# Patient Record
Sex: MALE | Race: WHITE | NOT HISPANIC OR LATINO | Employment: FULL TIME | URBAN - NONMETROPOLITAN AREA
[De-identification: names, ages, dates, MRNs, and addresses within clinical notes are randomized per-mention and may not be internally consistent; named-entity substitution may affect disease eponyms.]

---

## 2017-08-30 ENCOUNTER — APPOINTMENT (OUTPATIENT)
Dept: GENERAL RADIOLOGY | Facility: HOSPITAL | Age: 62
End: 2017-08-30
Attending: FAMILY MEDICINE

## 2017-08-30 PROCEDURE — 71020 HC CHEST PA AND LATERAL: CPT

## 2018-02-15 ENCOUNTER — OFFICE VISIT (OUTPATIENT)
Dept: OTOLARYNGOLOGY | Facility: CLINIC | Age: 63
End: 2018-02-15

## 2018-02-15 VITALS
WEIGHT: 261 LBS | HEART RATE: 60 BPM | SYSTOLIC BLOOD PRESSURE: 130 MMHG | TEMPERATURE: 97.8 F | DIASTOLIC BLOOD PRESSURE: 88 MMHG | BODY MASS INDEX: 38.66 KG/M2 | HEIGHT: 69 IN

## 2018-02-15 DIAGNOSIS — L43.9 LICHEN PLANUS: Primary | ICD-10-CM

## 2018-02-15 DIAGNOSIS — K21.9 LARYNGOPHARYNGEAL REFLUX: ICD-10-CM

## 2018-02-15 PROCEDURE — 99203 OFFICE O/P NEW LOW 30 MIN: CPT | Performed by: OTOLARYNGOLOGY

## 2018-02-15 RX ORDER — BETAMETHASONE DIPROPIONATE 0.5 MG/ML
LOTION, AUGMENTED TOPICAL DAILY
Qty: 15 ML | Refills: 1 | Status: SHIPPED | OUTPATIENT
Start: 2018-02-15

## 2018-02-15 RX ORDER — OXYMETAZOLINE HYDROCHLORIDE 0.05 G/100ML
2 SPRAY NASAL 2 TIMES DAILY
COMMUNITY

## 2018-02-15 RX ORDER — AZELASTINE 1 MG/ML
2 SPRAY, METERED NASAL DAILY
Qty: 1 EACH | Refills: 0 | Status: SHIPPED | OUTPATIENT
Start: 2018-02-15 | End: 2018-03-17

## 2018-02-15 RX ORDER — AZELASTINE 1 MG/ML
2 SPRAY, METERED NASAL 2 TIMES DAILY
COMMUNITY
End: 2018-02-15 | Stop reason: SDUPTHER

## 2018-02-15 NOTE — PROGRESS NOTES
YOB: 1955  Location: [facility]  Location Address: [address] [place] [address], [facility], [address] [place] [address]  Location Phone: [phone]    Chief Complaint   Patient presents with   • Tongue lesion       History of Present Illness  Rikki Amaya is a 62 y.o. male.  Rikki Amaya is here for evaluation of ENT complaints. The patient has had problems with lesion of tongue and right cheek.   The patient has had moderate symptoms. The symptoms have been present approximately 1 month.  The symptoms are aggravated by  eating and reflux disease. There have been no factors that have improved the symptoms. Patient states he has a sore throat, hoarseness and reflux has not been controlled on Protonix recently. He denies dysphagia, ear pain, swelling and tenderness of neck and sinus complaints.          Past Medical History:   Diagnosis Date   • Basal cell carcinoma    • Hyperlipidemia    • Hypertension    • Kidney stone        Past Surgical History:   Procedure Laterality Date   • GANGLION CYST EXCISION     • KNEE ARTHROPLASTY     • NECK SURGERY     • SHOULDER SURGERY     • UMBILICAL HERNIA REPAIR         Outpatient Prescriptions Marked as Taking for the 2/15/18 encounter (Office Visit) with Garry Hernandez MD   Medication Sig Dispense Refill   • amLODIPine (NORVASC) 10 MG tablet Take 10 mg by mouth Daily.     • azelastine (ASTELIN) 0.1 % nasal spray 2 sprays into each nostril 2 (Two) Times a Day. Use in each nostril as directed     • Ibuprofen-Diphenhydramine Cit (ADVIL PM PO) Take  by mouth.     • lisinopril 10 MG tablet 20 mg, hydrochlorothiazide 25 MG tablet 12.5 mg Take  by mouth Daily.     • metoprolol succinate XL (TOPROL-XL) 100 MG 24 hr tablet Take 100 mg by mouth Daily.     • oxymetazoline (AFRIN) 0.05 % nasal spray 2 sprays into each nostril 2 (Two) Times a Day.     • pantoprazole (PROTONIX) 40 MG EC tablet Take 40 mg by mouth Daily.         Review of patient's allergies indicates no known  allergies.    History reviewed. No pertinent family history.    Social History     Social History   • Marital status:      Spouse name: N/A   • Number of children: N/A   • Years of education: N/A     Occupational History   • Not on file.     Social History Main Topics   • Smoking status: Never Smoker   • Smokeless tobacco: Former User     Types: Chew   • Alcohol use No   • Drug use: Not on file   • Sexual activity: Not on file     Other Topics Concern   • Not on file     Social History Narrative       Review of Systems   Constitutional: Negative.    HENT: Positive for sore throat and voice change.         Lesion of tongue and right cheek, reflux   Eyes: Negative.    Respiratory: Negative.    Cardiovascular: Negative.    Gastrointestinal: Negative.    Endocrine: Negative.    Musculoskeletal: Negative.    Skin: Negative.    Allergic/Immunologic: Negative.    Neurological: Negative.    Hematological: Negative.    Psychiatric/Behavioral: Negative.        Vitals:    02/15/18 1454   BP: 130/88   Pulse: 60   Temp: 97.8 °F (36.6 °C)       Body mass index is 38.54 kg/(m^2).    Objective     Physical Exam  CONSTITUTIONAL: Obese, well nourished, alert, oriented, in no acute distress     COMMUNICATION AND VOICE: able to communicate normally, normal voice quality    HEAD: normocephalic, no lesions, atraumatic, no tenderness, no masses     FACE: appearance normal, no lesions, no tenderness, no deformities, facial motion symmetric    SALIVARY GLANDS: parotid glands with no tenderness, no swelling, no masses, submandibular glands with normal size, nontender    EYES: ocular motility normal, eyelids normal, orbits normal, no proptosis, conjunctiva normal , pupils equal, round     EARS:  Hearing: response to conversational voice normal bilaterally   External Ears: auricles without lesions  Otoscopic: tympanic membrane appearance normal, no lesions, no perforation, normal mobility, no fluid    NOSE:  External Nose: structure  normal, no tenderness on palpation, no nasal discharge, no lesions, no evidence of trauma, nostrils patent   Intranasal Exam: nasal mucosa normal, vestibule within normal limits, inferior turbinate normal, nasal septum midline   Nasopharynx:     ORAL:  Lips: upper and lower lips without lesion   Teeth: dentition within normal limits for age   Gums: gingivae healthy   Oral Mucosa: oral mucosa with mild erythema and associated reticulated stria in a lacelike pattern over the right and left buccal mucosa, slightly worse on the left  Floor of Mouth: Warthin’s duct patent, mucosa normal  Tongue: lingual mucosa normal with small lacelike pattern the right lateral tongue without ulceration, normal tongue mobility   Palate: soft and hard palates with normal mucosa and structure  Oropharynx: oropharyngeal mucosa normal    HYPOPHARYNX:   LARYNX:     NECK: neck appearance normal, no mass,  noted without erythema or tenderness    THYROID: no overt thyromegaly, no tenderness, nodules or mass present on palpation, position midline     LYMPH NODES: no lymphadenopathy    CHEST/RESPIRATORY: respiratory effort normal, normal breath sounds     CARDIOVASCULAR: rate and rhythm normal, extremities without cyanosis or edema      NEUROLOGIC/PSYCHIATRIC: oriented to time, place and person, mood normal, affect appropriate, CN II-XII intact grossly    Assessment/Plan   Rikki was seen today for tongue lesion.    Diagnoses and all orders for this visit:    Lichen planus    Laryngopharyngeal reflux    Other orders  -     betamethasone, augmented, (DIPROLENE) 0.05 % lotion; Apply  topically Daily. As needed      * Surgery not found *  No orders of the defined types were placed in this encounter.    Return in about 3 months (around 5/15/2018) for Recheck.       Patient Instructions     Normal course for lichen planus was discussed with the patient including the small risk of the development of malignancy.    Follow-up was recommended and  encouraged    Dietary modifications were discussed including Paleo  Reflux control  Call or return for any problems otherwise follow-up in 3 months        IF YOU SMOKE OR USE TOBACCO PLEASE READ THE FOLLOWING:    Why is smoking bad for me?  Smoking increases the risk of heart disease, lung disease, vascular disease, stroke, and cancer.     If you smoke, STOP!    If you would like more information on quitting smoking, please visit the [facility] website: [email or web address]   This link will provide additional resources including the QUIT line and the Beat the Pack support groups.     For more information:    Quit Now [place]  1-800-QUIT-NOW  https://[place].VubiquitylogAdvisity.org/en-US/    MyPlate from [employer]  The general, healthful diet is based on the 2010 Dietary Guidelines for Americans. The amount of food you need to eat from each food group depends on your age, sex, and level of physical activity and can be individualized by a dietitian. Go to ChooseAvega SystemsPlate.gov for more information.  What do I need to know about the MyPlate plan?  · Enjoy your food, but eat less.  · Avoid oversized portions.  ¨ ½ of your plate should include fruits and vegetables.  ¨ ¼ of your plate should be grains.  ¨ ¼ of your plate should be protein.  Grains   · Make at least half of your grains whole grains.  · For a 2,000 calorie daily food plan, eat 6 oz every day.  · 1 oz is about 1 slice bread, 1 cup cereal, or ½ cup cooked rice, cereal, or pasta.  Vegetables   · Make half your plate fruits and vegetables.  · For a 2,000 calorie daily food plan, eat 2½ cups every day.  · 1 cup is about 1 cup raw or cooked vegetables or vegetable juice or 2 cups raw leafy greens.  Fruits   · Make half your plate fruits and vegetables.  · For a 2,000 calorie daily food plan, eat 2 cups every day.  · 1 cup is about 1 cup fruit or 100% fruit juice or ½ cup dried fruit.  Protein   · For a 2,000 calorie daily food  plan, eat 5½ oz every day.  · 1 oz is about 1 oz meat, poultry, or fish, ¼ cup cooked beans, 1 egg, 1 Tbsp peanut butter, or ½ oz nuts or seeds.  Dairy   · Switch to fat-free or low-fat (1%) milk.  · For a 2,000 calorie daily food plan, eat 3 cups every day.  · 1 cup is about 1 cup milk or yogurt or soy milk (soy beverage), 1½ oz natural cheese, or 2 oz processed cheese.  Fats, Oils, and Empty Calories   · Only small amounts of oils are recommended.  · Empty calories are calories from solid fats or added sugars.  · Compare sodium in foods like soup, bread, and frozen meals. Choose the foods with lower numbers.  · Drink water instead of sugary drinks.  What foods can I eat?  Grains   Whole grains such as whole wheat, quinoa, millet, and bulgur. Bread, rolls, and pasta made from whole grains. Brown or wild rice. Hot or cold cereals made from whole grains and without added sugar.  Vegetables   All fresh vegetables, especially fresh red, dark green, or orange vegetables. Peas and beans. Low-sodium frozen or canned vegetables prepared without added salt. Low-sodium vegetable juices.  Fruits   All fresh, frozen, and dried fruits. Canned fruit packed in water or fruit juice without added sugar. Fruit juices without added sugar.  Meats and Other Protein Sources   Boiled, baked, or grilled lean meat trimmed of fat. Skinless poultry. Fresh seafood and shellfish. Canned seafood packed in water. Unsalted nuts and unsalted nut butters. Tofu. Dried beans and pea. Eggs.  Dairy   Low-fat or fat-free milk, yogurt, and cheeses.  Sweets and Desserts   Frozen desserts made from low-fat milk.  Fats and Oils   Olive, peanut, and canola oils and margarine. Salad dressing and mayonnaise made from these oils.  Other   Soups and casseroles made from allowed ingredients and without added fat or salt.  The items listed above may not be a complete list of recommended foods or beverages. Contact your dietitian for more options.   What foods are  not recommended?  Grains   Sweetened, low-fiber cereals. Packaged baked goods. Snack crackers and chips. Cheese crackers, butter crackers, and biscuits. Frozen waffles, sweet breads, doughnuts, pastries, packaged baking mixes, pancakes, cakes, and cookies.  Vegetables   Regular canned or frozen vegetables or vegetables prepared with salt. Canned tomatoes. Canned tomato sauce. Fried vegetables. Vegetables in cream sauce or cheese sauce.  Fruits   Fruits packed in syrup or made with added sugar.  Meats and Other Protein Sources   Marbled or fatty meats such as ribs. Poultry with skin. Fried meats, poultry, eggs, or fish. Sausages, hot dogs, and deli meats such as pastrami, bologna, or salami.  Dairy   Whole milk, cream, cheeses made from whole milk, sour cream. Ice cream or yogurt made from whole milk or with added sugar.  Beverages   For adults, no more than one alcoholic drink per day. Regular soft drinks or other sugary beverages. Juice drinks.  Sweets and Desserts   Sugary or fatty desserts, candy, and other sweets.  Fats and Oils   Solid shortening or partially hydrogenated oils. Solid margarine. Margarine that contains trans fats. Butter.  The items listed above may not be a complete list of foods and beverages to avoid. Contact your dietitian for more information.   This information is not intended to replace advice given to you by your health care provider. Make sure you discuss any questions you have with your health care provider.  Document Released: 01/06/2009 Document Revised: 05/25/2017 Document Reviewed: 11/26/2014  [employer] Interactive Patient Education © 2017 [employer] Inc.     Calorie Counting for Weight Loss  Calories are units of energy. Your body needs a certain amount of calories from food to keep you going throughout the day. When you eat more calories than your body needs, your body stores the extra calories as fat. When you eat fewer calories than your body needs, your body burns fat to get the  energy it needs.  Calorie counting means keeping track of how many calories you eat and drink each day. Calorie counting can be helpful if you need to lose weight. If you make sure to eat fewer calories than your body needs, you should lose weight. Ask your health care provider what a healthy weight is for you.  For calorie counting to work, you will need to eat the right number of calories in a day in order to lose a healthy amount of weight per week. A dietitian can help you determine how many calories you need in a day and will give you suggestions on how to reach your calorie goal.  · A healthy amount of weight to lose per week is usually 1-2 lb (0.5-0.9 kg). This usually means that your daily calorie intake should be reduced by 500-750 calories.  · Eating 1,200 - 1,500 calories per day can help most women lose weight.  · Eating 1,500 - 1,800 calories per day can help most men lose weight.  What is my plan?  My goal is to have __________ calories per day.  If I have this many calories per day, I should lose around __________ pounds per week.  What do I need to know about calorie counting?  In order to meet your daily calorie goal, you will need to:  · Find out how many calories are in each food you would like to eat. Try to do this before you eat.  · Decide how much of the food you plan to eat.  · Write down what you ate and how many calories it had. Doing this is called keeping a food log.  To successfully lose weight, it is important to balance calorie counting with a healthy lifestyle that includes regular activity. Aim for 150 minutes of moderate exercise (such as walking) or 75 minutes of vigorous exercise (such as running) each week.  Where do I find calorie information?     The number of calories in a food can be found on a Nutrition Facts label. If a food does not have a Nutrition Facts label, try to look up the calories online or ask your dietitian for help.  Remember that calories are listed per  serving. If you choose to have more than one serving of a food, you will have to multiply the calories per serving by the amount of servings you plan to eat. For example, the label on a package of bread might say that a serving size is 1 slice and that there are 90 calories in a serving. If you eat 1 slice, you will have eaten 90 calories. If you eat 2 slices, you will have eaten 180 calories.  How do I keep a food log?  Immediately after each meal, record the following information in your food log:  · What you ate. Don't forget to include toppings, sauces, and other extras on the food.  · How much you ate. This can be measured in cups, ounces, or number of items.  · How many calories each food and drink had.  · The total number of calories in the meal.  Keep your food log near you, such as in a small notebook in your pocket, or use a mobile geraldo or website. Some programs will calculate calories for you and show you how many calories you have left for the day to meet your goal.  What are some calorie counting tips?  · Use your calories on foods and drinks that will fill you up and not leave you hungry:  ¨ Some examples of foods that fill you up are nuts and nut butters, vegetables, lean proteins, and high-fiber foods like whole grains. High-fiber foods are foods with more than 5 g fiber per serving.  ¨ Drinks such as sodas, specialty coffee drinks, alcohol, and juices have a lot of calories, yet do not fill you up.  · Eat nutritious foods and avoid empty calories. Empty calories are calories you get from foods or beverages that do not have many vitamins or protein, such as candy, sweets, and soda. It is better to have a nutritious high-calorie food (such as an avocado) than a food with few nutrients (such as a bag of chips).  · Know how many calories are in the foods you eat most often. This will help you calculate calorie counts faster.  · Pay attention to calories in drinks. Low-calorie drinks include water and  "unsweetened drinks.  · Pay attention to nutrition labels for \"low fat\" or \"fat free\" foods. These foods sometimes have the same amount of calories or more calories than the full fat versions. They also often have added sugar, starch, or salt, to make up for flavor that was removed with the fat.  · Find a way of tracking calories that works for you. Get creative. Try different apps or programs if writing down calories does not work for you.  What are some portion control tips?  · Know how many calories are in a serving. This will help you know how many servings of a certain food you can have.  · Use a measuring cup to measure serving sizes. You could also try weighing out portions on a kitchen scale. With time, you will be able to estimate serving sizes for some foods.  · Take some time to put servings of different foods on your favorite plates, bowls, and cups so you know what a serving looks like.  · Try not to eat straight from a bag or box. Doing this can lead to overeating. Put the amount you would like to eat in a cup or on a plate to make sure you are eating the right portion.  · Use smaller plates, glasses, and bowls to prevent overeating.  · Try not to multitask (for example, watch TV or use your computer) while eating. If it is time to eat, sit down at a table and enjoy your food. This will help you to know when you are full. It will also help you to be aware of what you are eating and how much you are eating.  What are tips for following this plan?  Reading food labels   · Check the calorie count compared to the serving size. The serving size may be smaller than what you are used to eating.  · Check the source of the calories. Make sure the food you are eating is high in vitamins and protein and low in saturated and trans fats.  Shopping   · Read nutrition labels while you shop. This will help you make healthy decisions before you decide to purchase your food.  · Make a grocery list and stick to " it.  Cooking   · Try to cook your favorite foods in a healthier way. For example, try baking instead of frying.  · Use low-fat dairy products.  Meal planning   · Use more fruits and vegetables. Half of your plate should be fruits and vegetables.  · Include lean proteins like poultry and fish.  How do I count calories when eating out?  · Ask for smaller portion sizes.  · Consider sharing an entree and sides instead of getting your own entree.  · If you get your own entree, eat only half. Ask for a box at the beginning of your meal and put the rest of your entree in it so you are not tempted to eat it.  · If calories are listed on the menu, choose the lower calorie options.  · Choose dishes that include vegetables, fruits, whole grains, low-fat dairy products, and lean protein.  · Choose items that are boiled, broiled, grilled, or steamed. Stay away from items that are buttered, battered, fried, or served with cream sauce. Items labeled “crispy” are usually fried, unless stated otherwise.  · Choose water, low-fat milk, unsweetened iced tea, or other drinks without added sugar. If you want an alcoholic beverage, choose a lower calorie option such as a glass of wine or light beer.  · Ask for dressings, sauces, and syrups on the side. These are usually high in calories, so you should limit the amount you eat.  · If you want a salad, choose a garden salad and ask for grilled meats. Avoid extra toppings like reese, cheese, or fried items. Ask for the dressing on the side, or ask for olive oil and vinegar or lemon to use as dressing.  · Estimate how many servings of a food you are given. For example, a serving of cooked rice is ½ cup or about the size of half a baseball. Knowing serving sizes will help you be aware of how much food you are eating at restaurants. The list below tells you how big or small some common portion sizes are based on everyday objects:  ¨ 1 oz--4 stacked dice.  ¨ 3 oz--1 deck of cards.  ¨ 1 tsp--1  die.  ¨ 1 Tbsp--½ a ping-pong ball.  ¨ 2 Tbsp--1 ping-pong ball.  ¨ ½ cup--½ baseball.  ¨ 1 cup--1 baseball.  Summary  · Calorie counting means keeping track of how many calories you eat and drink each day. If you eat fewer calories than your body needs, you should lose weight.  · A healthy amount of weight to lose per week is usually 1-2 lb (0.5-0.9 kg). This usually means reducing your daily calorie intake by 500-750 calories.  · The number of calories in a food can be found on a Nutrition Facts label. If a food does not have a Nutrition Facts label, try to look up the calories online or ask your dietitian for help.  · Use your calories on foods and drinks that will fill you up, and not on foods and drinks that will leave you hungry.  · Use smaller plates, glasses, and bowls to prevent overeating.  This information is not intended to replace advice given to you by your health care provider. Make sure you discuss any questions you have with your health care provider.  Document Released: 12/18/2006 Document Revised: 11/17/2017 Document Reviewed: 11/17/2017  [employer] Interactive Patient Education © 2017 [employer].     Exercising to Lose Weight  Exercising can help you to lose weight. In order to lose weight through exercise, you need to do vigorous-intensity exercise. You can tell that you are exercising with vigorous intensity if you are breathing very hard and fast and cannot hold a conversation while exercising.  Moderate-intensity exercise helps to maintain your current weight. You can tell that you are exercising at a moderate level if you have a higher heart rate and faster breathing, but you are still able to hold a conversation.  How often should I exercise?  Choose an activity that you enjoy and set realistic goals. Your health care provider can help you to make an activity plan that works for you. Exercise regularly as directed by your health care provider. This may include:  · Doing resistance training  twice each week, such as:  ¨ Push-ups.  ¨ Sit-ups.  ¨ Lifting weights.  ¨ Using resistance bands.  · Doing a given intensity of exercise for a given amount of time. Choose from these options:  ¨ 150 minutes of moderate-intensity exercise every week.  ¨ 75 minutes of vigorous-intensity exercise every week.  ¨ A mix of moderate-intensity and vigorous-intensity exercise every week.  Children, pregnant women, people who are out of shape, people who are overweight, and older adults may need to consult a health care provider for individual recommendations. If you have any sort of medical condition, be sure to consult your health care provider before starting a new exercise program.  What are some activities that can help me to lose weight?  · Walking at a rate of at least 4.5 miles an hour.  · Jogging or running at a rate of 5 miles per hour.  · Biking at a rate of at least 10 miles per hour.  · Lap swimming.  · Roller-skating or in-line skating.  · Cross-country skiing.  · Vigorous competitive sports, such as football, basketball, and soccer.  · Jumping rope.  · Aerobic dancing.  How can I be more active in my day-to-day activities?  · Use the stairs instead of the elevator.  · Take a walk during your lunch break.  · If you drive, park your car farther away from work or school.  · If you take public transportation, get off one stop early and walk the rest of the way.  · Make all of your phone calls while standing up and walking around.  · Get up, stretch, and walk around every 30 minutes throughout the day.  What guidelines should I follow while exercising?  · Do not exercise so much that you hurt yourself, feel dizzy, or get very short of breath.  · Consult your health care provider prior to starting a new exercise program.  · Wear comfortable clothes and shoes with good support.  · Drink plenty of water while you exercise to prevent dehydration or heat stroke. Body water is lost during exercise and must be  replaced.  · Work out until you breathe faster and your heart beats faster.  This information is not intended to replace advice given to you by your health care provider. Make sure you discuss any questions you have with your health care provider.  Document Released: 01/20/2012 Document Revised: 05/25/2017 Document Reviewed: 05/21/2015  [employer] Interactive Patient Education © 2017 [employer].

## 2018-02-15 NOTE — PATIENT INSTRUCTIONS
Normal course for lichen planus was discussed with the patient including the small risk of the development of malignancy.    Follow-up was recommended and encouraged    Dietary modifications were discussed including Paleo  Reflux control  Call or return for any problems otherwise follow-up in 3 months        IF YOU SMOKE OR USE TOBACCO PLEASE READ THE FOLLOWING:    Why is smoking bad for me?  Smoking increases the risk of heart disease, lung disease, vascular disease, stroke, and cancer.     If you smoke, STOP!    If you would like more information on quitting smoking, please visit the [employer] website: [email or web address]   This link will provide additional resources including the QUIT line and the Beat the Pack support groups.     For more information:    Quit Now [place]  1-800-QUIT-NOW  https://[place].[employer].org/en-[place]/    MyPlate from [employer]  The general, healthful diet is based on the 2010 Dietary Guidelines for Americans. The amount of food you need to eat from each food group depends on your age, sex, and level of physical activity and can be individualized by a dietitian. Go to ChooseMyPlate.gov for more information.  What do I need to know about the MyPlate plan?  · Enjoy your food, but eat less.  · Avoid oversized portions.  ¨ ½ of your plate should include fruits and vegetables.  ¨ ¼ of your plate should be grains.  ¨ ¼ of your plate should be protein.  Grains   · Make at least half of your grains whole grains.  · For a 2,000 calorie daily food plan, eat 6 oz every day.  · 1 oz is about 1 slice bread, 1 cup cereal, or ½ cup cooked rice, cereal, or pasta.  Vegetables   · Make half your plate fruits and vegetables.  · For a 2,000 calorie daily food plan, eat 2½ cups every day.  · 1 cup is about 1 cup raw or cooked vegetables or vegetable juice or 2 cups raw leafy greens.  Fruits   · Make half your plate fruits and vegetables.  · For a 2,000 calorie  daily food plan, eat 2 cups every day.  · 1 cup is about 1 cup fruit or 100% fruit juice or ½ cup dried fruit.  Protein   · For a 2,000 calorie daily food plan, eat 5½ oz every day.  · 1 oz is about 1 oz meat, poultry, or fish, ¼ cup cooked beans, 1 egg, 1 Tbsp peanut butter, or ½ oz nuts or seeds.  Dairy   · Switch to fat-free or low-fat (1%) milk.  · For a 2,000 calorie daily food plan, eat 3 cups every day.  · 1 cup is about 1 cup milk or yogurt or soy milk (soy beverage), 1½ oz natural cheese, or 2 oz processed cheese.  Fats, Oils, and Empty Calories   · Only small amounts of oils are recommended.  · Empty calories are calories from solid fats or added sugars.  · Compare sodium in foods like soup, bread, and frozen meals. Choose the foods with lower numbers.  · Drink water instead of sugary drinks.  What foods can I eat?  Grains   Whole grains such as whole wheat, quinoa, millet, and bulgur. Bread, rolls, and pasta made from whole grains. Brown or wild rice. Hot or cold cereals made from whole grains and without added sugar.  Vegetables   All fresh vegetables, especially fresh red, dark green, or orange vegetables. Peas and beans. Low-sodium frozen or canned vegetables prepared without added salt. Low-sodium vegetable juices.  Fruits   All fresh, frozen, and dried fruits. Canned fruit packed in water or fruit juice without added sugar. Fruit juices without added sugar.  Meats and Other Protein Sources   Boiled, baked, or grilled lean meat trimmed of fat. Skinless poultry. Fresh seafood and shellfish. Canned seafood packed in water. Unsalted nuts and unsalted nut butters. Tofu. Dried beans and pea. Eggs.  Dairy   Low-fat or fat-free milk, yogurt, and cheeses.  Sweets and Desserts   Frozen desserts made from low-fat milk.  Fats and Oils   Olive, peanut, and canola oils and margarine. Salad dressing and mayonnaise made from these oils.  Other   Soups and casseroles made from allowed ingredients and without added  fat or salt.  The items listed above may not be a complete list of recommended foods or beverages. Contact your dietitian for more options.   What foods are not recommended?  Grains   Sweetened, low-fiber cereals. Packaged baked goods. Snack crackers and chips. Cheese crackers, butter crackers, and biscuits. Frozen waffles, sweet breads, doughnuts, pastries, packaged baking mixes, pancakes, cakes, and cookies.  Vegetables   Regular canned or frozen vegetables or vegetables prepared with salt. Canned tomatoes. Canned tomato sauce. Fried vegetables. Vegetables in cream sauce or cheese sauce.  Fruits   Fruits packed in syrup or made with added sugar.  Meats and Other Protein Sources   Marbled or fatty meats such as ribs. Poultry with skin. Fried meats, poultry, eggs, or fish. Sausages, hot dogs, and deli meats such as pastrami, bologna, or salami.  Dairy   Whole milk, cream, cheeses made from whole milk, sour cream. Ice cream or yogurt made from whole milk or with added sugar.  Beverages   For adults, no more than one alcoholic drink per day. Regular soft drinks or other sugary beverages. Juice drinks.  Sweets and Desserts   Sugary or fatty desserts, candy, and other sweets.  Fats and Oils   Solid shortening or partially hydrogenated oils. Solid margarine. Margarine that contains trans fats. Butter.  The items listed above may not be a complete list of foods and beverages to avoid. Contact your dietitian for more information.   This information is not intended to replace advice given to you by your health care provider. Make sure you discuss any questions you have with your health care provider.  Document Released: 01/06/2009 Document Revised: 05/25/2017 Document Reviewed: 11/26/2014  [employer]vier Interactive Patient Education © 2017 [employer]     Calorie Counting for Weight Loss  Calories are units of energy. Your body needs a certain amount of calories from food to keep you going throughout the day. When you eat more  calories than your body needs, your body stores the extra calories as fat. When you eat fewer calories than your body needs, your body burns fat to get the energy it needs.  Calorie counting means keeping track of how many calories you eat and drink each day. Calorie counting can be helpful if you need to lose weight. If you make sure to eat fewer calories than your body needs, you should lose weight. Ask your health care provider what a healthy weight is for you.  For calorie counting to work, you will need to eat the right number of calories in a day in order to lose a healthy amount of weight per week. A dietitian can help you determine how many calories you need in a day and will give you suggestions on how to reach your calorie goal.  · A healthy amount of weight to lose per week is usually 1-2 lb (0.5-0.9 kg). This usually means that your daily calorie intake should be reduced by 500-750 calories.  · Eating 1,200 - 1,500 calories per day can help most women lose weight.  · Eating 1,500 - 1,800 calories per day can help most men lose weight.  What is my plan?  My goal is to have __________ calories per day.  If I have this many calories per day, I should lose around __________ pounds per week.  What do I need to know about calorie counting?  In order to meet your daily calorie goal, you will need to:  · Find out how many calories are in each food you would like to eat. Try to do this before you eat.  · Decide how much of the food you plan to eat.  · Write down what you ate and how many calories it had. Doing this is called keeping a food log.  To successfully lose weight, it is important to balance calorie counting with a healthy lifestyle that includes regular activity. Aim for 150 minutes of moderate exercise (such as walking) or 75 minutes of vigorous exercise (such as running) each week.  Where do I find calorie information?     The number of calories in a food can be found on a Nutrition Facts label. If a  food does not have a Nutrition Facts label, try to look up the calories online or ask your dietitian for help.  Remember that calories are listed per serving. If you choose to have more than one serving of a food, you will have to multiply the calories per serving by the amount of servings you plan to eat. For example, the label on a package of bread might say that a serving size is 1 slice and that there are 90 calories in a serving. If you eat 1 slice, you will have eaten 90 calories. If you eat 2 slices, you will have eaten 180 calories.  How do I keep a food log?  Immediately after each meal, record the following information in your food log:  · What you ate. Don't forget to include toppings, sauces, and other extras on the food.  · How much you ate. This can be measured in cups, ounces, or number of items.  · How many calories each food and drink had.  · The total number of calories in the meal.  Keep your food log near you, such as in a small notebook in your pocket, or use a mobile geraldo or website. Some programs will calculate calories for you and show you how many calories you have left for the day to meet your goal.  What are some calorie counting tips?  · Use your calories on foods and drinks that will fill you up and not leave you hungry:  ¨ Some examples of foods that fill you up are nuts and nut butters, vegetables, lean proteins, and high-fiber foods like whole grains. High-fiber foods are foods with more than 5 g fiber per serving.  ¨ Drinks such as sodas, specialty coffee drinks, alcohol, and juices have a lot of calories, yet do not fill you up.  · Eat nutritious foods and avoid empty calories. Empty calories are calories you get from foods or beverages that do not have many vitamins or protein, such as candy, sweets, and soda. It is better to have a nutritious high-calorie food (such as an avocado) than a food with few nutrients (such as a bag of chips).  · Know how many calories are in the foods  "you eat most often. This will help you calculate calorie counts faster.  · Pay attention to calories in drinks. Low-calorie drinks include water and unsweetened drinks.  · Pay attention to nutrition labels for \"low fat\" or \"fat free\" foods. These foods sometimes have the same amount of calories or more calories than the full fat versions. They also often have added sugar, starch, or salt, to make up for flavor that was removed with the fat.  · Find a way of tracking calories that works for you. Get creative. Try different apps or programs if writing down calories does not work for you.  What are some portion control tips?  · Know how many calories are in a serving. This will help you know how many servings of a certain food you can have.  · Use a measuring cup to measure serving sizes. You could also try weighing out portions on a kitchen scale. With time, you will be able to estimate serving sizes for some foods.  · Take some time to put servings of different foods on your favorite plates, bowls, and cups so you know what a serving looks like.  · Try not to eat straight from a bag or box. Doing this can lead to overeating. Put the amount you would like to eat in a cup or on a plate to make sure you are eating the right portion.  · Use smaller plates, glasses, and bowls to prevent overeating.  · Try not to multitask (for example, watch TV or use your computer) while eating. If it is time to eat, sit down at a table and enjoy your food. This will help you to know when you are full. It will also help you to be aware of what you are eating and how much you are eating.  What are tips for following this plan?  Reading food labels   · Check the calorie count compared to the serving size. The serving size may be smaller than what you are used to eating.  · Check the source of the calories. Make sure the food you are eating is high in vitamins and protein and low in saturated and trans fats.  Shopping   · Read nutrition " labels while you shop. This will help you make healthy decisions before you decide to purchase your food.  · Make a grocery list and stick to it.  Cooking   · Try to cook your favorite foods in a healthier way. For example, try baking instead of frying.  · Use low-fat dairy products.  Meal planning   · Use more fruits and vegetables. Half of your plate should be fruits and vegetables.  · Include lean proteins like poultry and fish.  How do I count calories when eating out?  · Ask for smaller portion sizes.  · Consider sharing an entree and sides instead of getting your own entree.  · If you get your own entree, eat only half. Ask for a box at the beginning of your meal and put the rest of your entree in it so you are not tempted to eat it.  · If calories are listed on the menu, choose the lower calorie options.  · Choose dishes that include vegetables, fruits, whole grains, low-fat dairy products, and lean protein.  · Choose items that are boiled, broiled, grilled, or steamed. Stay away from items that are buttered, battered, fried, or served with cream sauce. Items labeled “crispy” are usually fried, unless stated otherwise.  · Choose water, low-fat milk, unsweetened iced tea, or other drinks without added sugar. If you want an alcoholic beverage, choose a lower calorie option such as a glass of wine or light beer.  · Ask for dressings, sauces, and syrups on the side. These are usually high in calories, so you should limit the amount you eat.  · If you want a salad, choose a garden salad and ask for grilled meats. Avoid extra toppings like reese, cheese, or fried items. Ask for the dressing on the side, or ask for olive oil and vinegar or lemon to use as dressing.  · Estimate how many servings of a food you are given. For example, a serving of cooked rice is ½ cup or about the size of half a baseball. Knowing serving sizes will help you be aware of how much food you are eating at restaurants. The list below tells  you how big or small some common portion sizes are based on everyday objects:  ¨ 1 oz--4 stacked dice.  ¨ 3 oz--1 deck of cards.  ¨ 1 tsp--1 die.  ¨ 1 Tbsp--½ a ping-pong ball.  ¨ 2 Tbsp--1 ping-pong ball.  ¨ ½ cup--½ baseball.  ¨ 1 cup--1 baseball.  Summary  · Calorie counting means keeping track of how many calories you eat and drink each day. If you eat fewer calories than your body needs, you should lose weight.  · A healthy amount of weight to lose per week is usually 1-2 lb (0.5-0.9 kg). This usually means reducing your daily calorie intake by 500-750 calories.  · The number of calories in a food can be found on a Nutrition Facts label. If a food does not have a Nutrition Facts label, try to look up the calories online or ask your dietitian for help.  · Use your calories on foods and drinks that will fill you up, and not on foods and drinks that will leave you hungry.  · Use smaller plates, glasses, and bowls to prevent overeating.  This information is not intended to replace advice given to you by your health care provider. Make sure you discuss any questions you have with your health care provider.  Document Released: 12/18/2006 Document Revised: 11/17/2017 Document Reviewed: 11/17/2017  [employer] Interactive Patient Education © 2017 [employer]     Exercising to Lose Weight  Exercising can help you to lose weight. In order to lose weight through exercise, you need to do vigorous-intensity exercise. You can tell that you are exercising with vigorous intensity if you are breathing very hard and fast and cannot hold a conversation while exercising.  Moderate-intensity exercise helps to maintain your current weight. You can tell that you are exercising at a moderate level if you have a higher heart rate and faster breathing, but you are still able to hold a conversation.  How often should I exercise?  Choose an activity that you enjoy and set realistic goals. Your health care provider can help you to make an  activity plan that works for you. Exercise regularly as directed by your health care provider. This may include:  · Doing resistance training twice each week, such as:  ¨ Push-ups.  ¨ Sit-ups.  ¨ Lifting weights.  ¨ Using resistance bands.  · Doing a given intensity of exercise for a given amount of time. Choose from these options:  ¨ 150 minutes of moderate-intensity exercise every week.  ¨ 75 minutes of vigorous-intensity exercise every week.  ¨ A mix of moderate-intensity and vigorous-intensity exercise every week.  Children, pregnant women, people who are out of shape, people who are overweight, and older adults may need to consult a health care provider for individual recommendations. If you have any sort of medical condition, be sure to consult your health care provider before starting a new exercise program.  What are some activities that can help me to lose weight?  · Walking at a rate of at least 4.5 miles an hour.  · Jogging or running at a rate of 5 miles per hour.  · Biking at a rate of at least 10 miles per hour.  · Lap swimming.  · Roller-skating or in-line skating.  · Cross-country skiing.  · Vigorous competitive sports, such as football, basketball, and soccer.  · Jumping rope.  · Aerobic dancing.  How can I be more active in my day-to-day activities?  · Use the stairs instead of the elevator.  · Take a walk during your lunch break.  · If you drive, park your car farther away from work or school.  · If you take public transportation, get off one stop early and walk the rest of the way.  · Make all of your phone calls while standing up and walking around.  · Get up, stretch, and walk around every 30 minutes throughout the day.  What guidelines should I follow while exercising?  · Do not exercise so much that you hurt yourself, feel dizzy, or get very short of breath.  · Consult your health care provider prior to starting a new exercise program.  · Wear comfortable clothes and shoes with good  support.  · Drink plenty of water while you exercise to prevent dehydration or heat stroke. Body water is lost during exercise and must be replaced.  · Work out until you breathe faster and your heart beats faster.  This information is not intended to replace advice given to you by your health care provider. Make sure you discuss any questions you have with your health care provider.  Document Released: 01/20/2012 Document Revised: 05/25/2017 Document Reviewed: 05/21/2015  [employer] Interactive Patient Education © 2017 [employer]

## 2018-03-01 ENCOUNTER — TELEPHONE (OUTPATIENT)
Dept: OTOLARYNGOLOGY | Facility: CLINIC | Age: 63
End: 2018-03-01

## 2018-03-01 RX ORDER — TRIAMCINOLONE ACETONIDE 0.1 %
PASTE (GRAM) DENTAL 3 TIMES DAILY
Qty: 5 G | Refills: 0 | Status: SHIPPED | OUTPATIENT
Start: 2018-03-01 | End: 2018-03-15

## 2019-08-08 ENCOUNTER — HOSPITAL ENCOUNTER (EMERGENCY)
Facility: HOSPITAL | Age: 64
Discharge: HOME | End: 2019-08-08
Admitting: EMERGENCY MEDICINE

## 2019-08-08 VITALS
TEMPERATURE: 98.4 F | HEIGHT: 67 IN | WEIGHT: 258 LBS | SYSTOLIC BLOOD PRESSURE: 168 MMHG | BODY MASS INDEX: 40.49 KG/M2 | OXYGEN SATURATION: 96 % | DIASTOLIC BLOOD PRESSURE: 93 MMHG | RESPIRATION RATE: 18 BRPM | HEART RATE: 64 BPM

## 2019-08-08 DIAGNOSIS — S01.511A LIP LACERATION, INITIAL ENCOUNTER: Primary | ICD-10-CM

## 2019-08-08 PROCEDURE — 99283 EMERGENCY DEPT VISIT LOW MDM: CPT

## 2019-08-08 PROCEDURE — 90715 TDAP VACCINE 7 YRS/> IM: CPT | Performed by: PHYSICIAN ASSISTANT

## 2019-08-08 PROCEDURE — 90471 IMMUNIZATION ADMIN: CPT | Performed by: PHYSICIAN ASSISTANT

## 2019-08-08 PROCEDURE — 25010000002 TDAP 5-2.5-18.5 LF-MCG/0.5 SUSPENSION: Performed by: PHYSICIAN ASSISTANT

## 2019-08-08 RX ORDER — LIDOCAINE HYDROCHLORIDE 10 MG/ML
10 INJECTION, SOLUTION EPIDURAL; INFILTRATION; INTRACAUDAL; PERINEURAL ONCE
Status: DISCONTINUED | OUTPATIENT
Start: 2019-08-08 | End: 2019-08-08

## 2019-08-08 RX ORDER — LIDOCAINE HYDROCHLORIDE AND EPINEPHRINE BITARTRATE 20; .01 MG/ML; MG/ML
10 INJECTION, SOLUTION SUBCUTANEOUS ONCE
Status: COMPLETED | OUTPATIENT
Start: 2019-08-08 | End: 2019-08-08

## 2019-08-08 RX ADMIN — TETANUS TOXOID, REDUCED DIPHTHERIA TOXOID AND ACELLULAR PERTUSSIS VACCINE, ADSORBED 0.5 ML: 5; 2.5; 8; 8; 2.5 SUSPENSION INTRAMUSCULAR at 20:04

## 2019-08-08 RX ADMIN — LIDOCAINE HYDROCHLORIDE,EPINEPHRINE BITARTRATE 10 ML: 20; .01 INJECTION, SOLUTION INFILTRATION; PERINEURAL at 20:06

## 2019-08-09 NOTE — CONSULTS
Referring Provider: No ref. provider found  Reason for Consultation: Lower lip laceration        Chief complaint lower lip laceration    Subjective .     History of present illness: The patient sustained a vertical laceration of the left lower lip when a  kicked back and struck him in the face.  The patient requested a plastic surgeon for repair of the laceration.  The patient denied any loss of consciousness, loose teeth, etc.    Review of Systems  Pertinent items are noted in HPI    History  Past Medical History:   Diagnosis Date   • Basal cell carcinoma    • Hyperlipidemia    • Hypertension    • Kidney stone    , Past Surgical History:   Procedure Laterality Date   • GANGLION CYST EXCISION     • KNEE ARTHROPLASTY     • NECK SURGERY     • SHOULDER SURGERY     • UMBILICAL HERNIA REPAIR     , History reviewed. No pertinent family history., Social History     Tobacco Use   • Smoking status: Never Smoker   • Smokeless tobacco: Former User     Types: Chew   Substance Use Topics   • Alcohol use: No   • Drug use: No   ,   (Not in a hospital admission), Scheduled Meds:  , Continuous Infusions:    No current facility-administered medications for this encounter. , PRN Meds:   and Allergies:  Patient has no known allergies.    Objective     Vital Signs   Temp:  [98.4 °F (36.9 °C)] 98.4 °F (36.9 °C)  Heart Rate:  [69] 69  Resp:  [19] 19  BP: (180-189)/(83-93) 180/83    Physical Exam:     General Appearance:    Alert, cooperative, in no acute distress   Head:   There is a vertical laceration of the left lower lip involving all layers less than one half vertical height.  The wound is heavily contaminated with grit and the edges have been tattooed black from the .  The edges are quite rough and irregular.   Eyes:            Lids and lashes normal, conjunctivae and sclerae normal, no   icterus, no pallor, corneas clear, PERRLA   Ears:    Ears appear intact with no abnormalities noted   Throat:   No oral lesions,  no thrush, oral mucosa moist   Neck:   No adenopathy, supple, trachea midline, no thyromegaly, no   carotid bruit, no JVD   Back:     No kyphosis present, no scoliosis present, no skin lesions,      erythema or scars, no tenderness to percussion or                   palpation,   range of motion normal   Lungs:     Clear to auscultation,respirations regular, even and                  unlabored    Heart:    Regular rhythm and normal rate, normal S1 and S2, no            murmur, no gallop, no rub, no click   Chest Wall:    No abnormalities observed   Abdomen:     Normal bowel sounds, no masses, no organomegaly, soft        non-tender, non-distended, no guarding, no rebound                tenderness   Rectal:     Deferred   Extremities:   Moves all extremities well, no edema, no cyanosis, no             redness   Pulses:   Pulses palpable and equal bilaterally   Skin:   No bleeding, bruising or rash   Lymph nodes:   No palpable adenopathy   Neurologic:   Cranial nerves 2 - 12 grossly intact, sensation intact, DTR       present and equal bilaterally       Results Review:     Lab Results (last 24 hours)     ** No results found for the last 24 hours. **          Imaging:   Imaging Results (last 72 hours)     ** No results found for the last 72 hours. **            Procedure: The face was prepped and draped sterilely.  1% lidocaine with epinephrine was used for local infiltration anesthesia.  The ragged and tattooed skin edges of the vermilion and external skin were sharply excised to create smooth edges without tattoos.  The muscle layer was repaired using interrupted 5-0 Vicryl suture.  The vermilion border was repaired using a 6-0 nylon suture.  The laceration extending along the external skin was repaired using interrupted 6-0 nylon suture.  The dry vermilion was repaired using interrupted 5-0 plain gut suture.  The wet vermilion and mucosa was repaired using interrupted 6-0 chromic catgut suture.      Assessment/Plan            Complex laceration left lower lip less than one half vertical height    The patient will follow-up with me in 1 week for suture removal    I discussed the patients findings and my recommendations with patient    James Ashford MD  08/08/19  8:36 PM

## 2020-08-21 ENCOUNTER — OFFICE VISIT (OUTPATIENT)
Dept: GASTROENTEROLOGY | Facility: CLINIC | Age: 65
End: 2020-08-21

## 2020-08-21 VITALS
SYSTOLIC BLOOD PRESSURE: 182 MMHG | BODY MASS INDEX: 40.34 KG/M2 | HEART RATE: 66 BPM | WEIGHT: 257 LBS | TEMPERATURE: 97.1 F | DIASTOLIC BLOOD PRESSURE: 86 MMHG | HEIGHT: 67 IN | OXYGEN SATURATION: 99 %

## 2020-08-21 DIAGNOSIS — E11.9 CONTROLLED TYPE 2 DIABETES MELLITUS WITHOUT COMPLICATION, WITHOUT LONG-TERM CURRENT USE OF INSULIN (HCC): ICD-10-CM

## 2020-08-21 DIAGNOSIS — Z86.010 HX OF COLONIC POLYPS: ICD-10-CM

## 2020-08-21 DIAGNOSIS — K22.70 BARRETT'S ESOPHAGUS WITHOUT DYSPLASIA: Primary | ICD-10-CM

## 2020-08-21 DIAGNOSIS — I10 HTN (HYPERTENSION), BENIGN: ICD-10-CM

## 2020-08-21 DIAGNOSIS — K21.00 GASTROESOPHAGEAL REFLUX DISEASE WITH ESOPHAGITIS: ICD-10-CM

## 2020-08-21 PROBLEM — Z86.0100 HX OF COLONIC POLYPS: Status: ACTIVE | Noted: 2020-08-21

## 2020-08-21 PROCEDURE — 99203 OFFICE O/P NEW LOW 30 MIN: CPT | Performed by: CLINICAL NURSE SPECIALIST

## 2020-08-21 RX ORDER — LORATADINE 10 MG/1
10 TABLET ORAL DAILY
COMMUNITY

## 2020-08-21 RX ORDER — SODIUM, POTASSIUM,MAG SULFATES 17.5-3.13G
SOLUTION, RECONSTITUTED, ORAL ORAL
Qty: 2 BOTTLE | Refills: 0 | Status: SHIPPED | OUTPATIENT
Start: 2020-08-21 | End: 2022-10-18

## 2020-08-21 NOTE — PROGRESS NOTES
Chief Complaint   Patient presents with   • Colonoscopy     Subjective   HPI  Rikki Amaya is a 64 y.o. male who presents with hx of Barretts esophagus determined by biopsy. Course is constant.  Disease is stable , maintains on Protonix for the management of this. Last Endoscopy reviewed. He has no complaints of nausea or vomiting. No change in bowels. No wt loss. No BRBPR. No melena. No abdominal pain or dysphagia.    He is due for a colonoscopy for hx of polyps years ago. No change in bowels. No rectal bleeding. He had a colonoscopy in 2015 and it was normal his polyps were prior to that.     Past Medical History:   Diagnosis Date   • Basal cell carcinoma    • Hyperlipidemia    • Hypertension    • Kidney stone      Past Surgical History:   Procedure Laterality Date   • COLONOSCOPY  10/30/2015    Normal exam repeat in 5 years   • ENDOSCOPY  10/30/2015    Fundic glad polyp stomach   • GANGLION CYST EXCISION     • KNEE ARTHROPLASTY     • NECK SURGERY     • SHOULDER SURGERY     • SKIN CANCER EXCISION     • UMBILICAL HERNIA REPAIR             Current Outpatient Medications:   •  betamethasone, augmented, (DIPROLENE) 0.05 % lotion, Apply  topically Daily. As needed, Disp: 15 mL, Rfl: 1  •  Ibuprofen-Diphenhydramine Cit (ADVIL PM PO), Take  by mouth., Disp: , Rfl:   •  lisinopril 10 MG tablet 20 mg, hydrochlorothiazide 25 MG tablet 12.5 mg, Take  by mouth Daily., Disp: , Rfl:   •  loratadine (CLARITIN) 10 MG tablet, Take 10 mg by mouth Daily., Disp: , Rfl:   •  metFORMIN (GLUCOPHAGE) 500 MG tablet, Take 500 mg by mouth 2 (Two) Times a Day With Meals., Disp: , Rfl:   •  metoprolol succinate XL (TOPROL-XL) 100 MG 24 hr tablet, Take 100 mg by mouth Daily., Disp: , Rfl:   •  oxymetazoline (AFRIN) 0.05 % nasal spray, 2 sprays into each nostril 2 (Two) Times a Day., Disp: , Rfl:   •  pantoprazole (PROTONIX) 40 MG EC tablet, Take 40 mg by mouth Daily., Disp: , Rfl:   •  simvastatin (ZOCOR) 10 MG tablet, Take 10 mg by mouth  Every Night., Disp: , Rfl:   •  amLODIPine (NORVASC) 10 MG tablet, Take 10 mg by mouth Daily., Disp: , Rfl:   •  SUPREP BOWEL PREP KIT 17.5-3.13-1.6 GM/177ML solution oral solution, Take as directed by office instructions provided, Disp: 2 bottle, Rfl: 0  No Known Allergies  Social History     Socioeconomic History   • Marital status:      Spouse name: Not on file   • Number of children: Not on file   • Years of education: Not on file   • Highest education level: Not on file   Tobacco Use   • Smoking status: Never Smoker   • Smokeless tobacco: Former User     Types: Chew   Substance and Sexual Activity   • Alcohol use: No   • Drug use: No   • Sexual activity: Defer     Family History   Problem Relation Age of Onset   • Colon cancer Neg Hx      Health Maintenance   Topic Date Due   • URINE MICROALBUMIN  1955   • ANNUAL PHYSICAL  12/29/1957   • ZOSTER VACCINE (1 of 2) 12/29/2004   • HEPATITIS C SCREENING  08/30/2017   • DIABETIC FOOT EXAM  08/30/2017   • LIPID PANEL  08/30/2017   • DIABETIC EYE EXAM  08/30/2017   • Pneumococcal Vaccine Once at 65 Years Old  12/29/2019   • HEMOGLOBIN A1C  03/11/2020   • INFLUENZA VACCINE  08/01/2020   • COLONOSCOPY  10/30/2025   • TDAP/TD VACCINES (2 - Td) 08/08/2029     Review of Systems   Constitutional: Negative for activity change, appetite change, chills, diaphoresis, fatigue, fever and unexpected weight change.   HENT: Negative for ear pain, hearing loss, mouth sores, sore throat, trouble swallowing and voice change.    Eyes: Negative.    Respiratory: Negative for cough, choking, shortness of breath and wheezing.    Cardiovascular: Negative for chest pain and palpitations.   Gastrointestinal: Negative for abdominal pain, blood in stool, constipation, diarrhea, nausea and vomiting.   Endocrine: Negative for cold intolerance and heat intolerance.   Genitourinary: Negative for decreased urine volume, dysuria, frequency, hematuria and urgency.   Musculoskeletal: Negative  "for back pain, gait problem and myalgias.   Skin: Negative for color change, pallor and rash.   Allergic/Immunologic: Negative for food allergies and immunocompromised state.   Neurological: Negative for dizziness, tremors, seizures, syncope, weakness, light-headedness, numbness and headaches.   Hematological: Negative for adenopathy. Does not bruise/bleed easily.   Psychiatric/Behavioral: Negative for agitation and confusion. The patient is not nervous/anxious.    All other systems reviewed and are negative.    Objective   Vitals:    08/21/20 0941   BP: (!) 182/86   Pulse: 66   Temp: 97.1 °F (36.2 °C)   SpO2: 99%   Weight: 117 kg (257 lb)   Height: 170.2 cm (67\")     Body mass index is 40.25 kg/m².    Physical Exam   Constitutional: He is oriented to person, place, and time. He appears well-developed and well-nourished.   HENT:   Head: Normocephalic and atraumatic.   Eyes: Pupils are equal, round, and reactive to light.   Neck: Normal range of motion. Neck supple. No tracheal deviation present.   Cardiovascular: Normal rate, regular rhythm and normal heart sounds. Exam reveals no gallop and no friction rub.   No murmur heard.  Pulmonary/Chest: Effort normal and breath sounds normal. No respiratory distress. He has no wheezes. He has no rales. He exhibits no tenderness.   Abdominal: Soft. Bowel sounds are normal. He exhibits no distension. There is no hepatosplenomegaly. There is no tenderness. There is no rigidity, no rebound and no guarding.   Musculoskeletal: Normal range of motion. He exhibits no edema, tenderness or deformity.   Neurological: He is alert and oriented to person, place, and time. He has normal reflexes.   Skin: Skin is warm and dry. No rash noted. No pallor.   Psychiatric: He has a normal mood and affect. His behavior is normal. Judgment and thought content normal.       Assessment/Plan   Rikki was seen today for colonoscopy.    Diagnoses and all orders for this visit:    Barbosa's esophagus " without dysplasia  -     Case Request; Standing  -     Follow Anesthesia Guidelines / Standing Orders; Future  -     Obtain Informed Consent; Future  -     Case Request  -     SUPREP BOWEL PREP KIT 17.5-3.13-1.6 GM/177ML solution oral solution; Take as directed by office instructions provided    Gastroesophageal reflux disease with esophagitis    Hx of colonic polyps    Controlled type 2 diabetes mellitus without complication, without long-term current use of insulin (CMS/[facility])  Comments:  Hold Metformin the day of procedure    HTN (hypertension), benign  Comments:  cont BP medication the day of procedure    186/92 blood pressure recheck and I have advised him to call his PCP this morning and or go to the ER if he has symptoms with elevated blood pressure. He is to recheck this today and keep a watch on it.     ESOPHAGOGASTRODUODENOSCOPY WITH ANESTHESIA (N/A), COLONOSCOPY WITH ANESTHESIA (N/A)  Patient's Body mass index is 40.25 kg/m². BMI is above normal parameters. Recommendations include: nutrition counseling.      Citlalli Brandt, APRN  8/21/2020  10:12      IF YOU SMOKE OR USE TOBACCO PLEASE READ THE FOLLOWING:   3.5 minutes provided    Why is smoking bad for me?  Smoking increases the risk of heart disease, lung disease, vascular disease, stroke, and cancer.     If you smoke, STOP!    If you would like more information on quitting smoking, please visit the [employer] website: [email or web address]   This link will provide additional resources including the QUIT line and the Beat the Pack support groups.     For more information:    Quit Now [place]  1-800-QUIT-NOW  https://[place].quitlogix.org/en-[place]/    Obesity, Adult  Obesity is having too much body fat. If you have a BMI of 30 or more, you are obese. BMI is a number that explains how much body fat you have. Obesity is often caused by taking in (consuming) more calories than your body uses.  Obesity can  cause serious health problems. Changing your lifestyle can help to treat obesity.  Follow these instructions at home:  Eating and drinking     · Follow advice from your doctor about what to eat and drink. Your doctor may tell you to:  ¨ Cut down on (limit) fast foods, sweets, and processed snack foods.  ¨ Choose low-fat options. For example, choose low-fat milk instead of whole milk.  ¨ Eat 5 or more servings of fruits or vegetables every day.  ¨ Eat at home more often. This gives you more control over what you eat.  ¨ Choose healthy foods when you eat out.  ¨ Learn what a healthy portion size is. A portion size is the amount of a certain food that is healthy for you to eat at one time. This is different for each person.  ¨ Keep low-fat snacks available.  ¨ Avoid sugary drinks. These include soda, fruit juice, iced tea that is sweetened with sugar, and flavored milk.  ¨ Eat a healthy breakfast.  · Drink enough water to keep your pee (urine) clear or pale yellow.  · Do not go without eating for long periods of time (do not fast).  · Do not go on popular or trendy diets (fad diets).  Physical Activity   · Exercise often, as told by your doctor. Ask your doctor:  ¨ What types of exercise are safe for you.  ¨ How often you should exercise.  · Warm up and stretch before being active.  · Do slow stretching after being active (cool down).  · Rest between times of being active.  Lifestyle   · Limit how much time you spend in front of your TV, computer, or video game system (be less sedentary).  · Find ways to reward yourself that do not involve food.  · Limit alcohol intake to no more than 1 drink a day for nonpregnant women and 2 drinks a day for men. One drink equals 12 oz of beer, 5 oz of wine, or 1½ oz of hard liquor.  General instructions   · Keep a weight loss journal. This can help you keep track of:  ¨ The food that you eat.  ¨ The exercise that you do.  · Take over-the-counter and prescription medicines only as  told by your doctor.  · Take vitamins and supplements only as told by your doctor.  · Think about joining a support group. Your doctor may be able to help with this.  · Keep all follow-up visits as told by your doctor. This is important.  Contact a doctor if:  · You cannot meet your weight loss goal after you have changed your diet and lifestyle for 6 weeks.  This information is not intended to replace advice given to you by your health care provider. Make sure you discuss any questions you have with your health care provider.  Document Released: 03/11/2013 Document Revised: 05/25/2017 Document Reviewed: 10/05/2016  Elsevier Interactive Patient Education © 2017 Elsevier Inc.

## 2020-09-10 ENCOUNTER — TRANSCRIBE ORDERS (OUTPATIENT)
Dept: ADMINISTRATIVE | Facility: HOSPITAL | Age: 65
End: 2020-09-10

## 2020-09-10 DIAGNOSIS — Z01.818 PRE-OP TESTING: Primary | ICD-10-CM

## 2020-09-11 ENCOUNTER — TELEPHONE (OUTPATIENT)
Dept: GASTROENTEROLOGY | Facility: CLINIC | Age: 65
End: 2020-09-11

## 2020-10-06 ENCOUNTER — TELEPHONE (OUTPATIENT)
Dept: GASTROENTEROLOGY | Facility: CLINIC | Age: 65
End: 2020-10-06

## 2020-10-06 NOTE — TELEPHONE ENCOUNTER
Patient cancelled their recommended procedure due to Covid 19.  He has declined to schedule at this time even after the importance of the procedure was explained. The patient was instructed to contact our office with any questions or concerns. They were offered an opportunity to schedule an appointment with the provider to discuss any concerns,  their medical condition and treatment recommendations and alternative options. They were also instructed to call the office to reschedule when they were willing to proceed.

## 2022-03-15 ENCOUNTER — TRANSCRIBE ORDERS (OUTPATIENT)
Dept: ADMINISTRATIVE | Facility: HOSPITAL | Age: 67
End: 2022-03-15

## 2022-03-15 ENCOUNTER — LAB (OUTPATIENT)
Dept: LAB | Facility: HOSPITAL | Age: 67
End: 2022-03-15

## 2022-03-15 DIAGNOSIS — R00.2 PALPITATIONS: ICD-10-CM

## 2022-03-15 DIAGNOSIS — O16.1 HYPERTENSION AFFECTING PREGNANCY IN FIRST TRIMESTER: Primary | ICD-10-CM

## 2022-03-15 DIAGNOSIS — R94.31 ABNORMAL ELECTROCARDIOGRAM: ICD-10-CM

## 2022-03-15 DIAGNOSIS — R53.83 TIREDNESS: ICD-10-CM

## 2022-03-15 DIAGNOSIS — O16.1 HYPERTENSION AFFECTING PREGNANCY IN FIRST TRIMESTER: ICD-10-CM

## 2022-03-15 DIAGNOSIS — E78.00 PURE HYPERCHOLESTEROLEMIA: ICD-10-CM

## 2022-03-15 DIAGNOSIS — E11.69 DIABETES MELLITUS ASSOCIATED WITH HORMONAL ETIOLOGY: ICD-10-CM

## 2022-03-15 PROCEDURE — 36415 COLL VENOUS BLD VENIPUNCTURE: CPT

## 2022-03-15 PROCEDURE — 84443 ASSAY THYROID STIM HORMONE: CPT

## 2022-03-15 PROCEDURE — 82607 VITAMIN B-12: CPT

## 2022-03-15 PROCEDURE — 84403 ASSAY OF TOTAL TESTOSTERONE: CPT

## 2022-03-15 PROCEDURE — 84439 ASSAY OF FREE THYROXINE: CPT

## 2022-03-16 ENCOUNTER — TRANSCRIBE ORDERS (OUTPATIENT)
Dept: ADMINISTRATIVE | Facility: HOSPITAL | Age: 67
End: 2022-03-16

## 2022-03-16 DIAGNOSIS — E66.01 MORBID OBESITY DUE TO EXCESS CALORIES: ICD-10-CM

## 2022-03-16 DIAGNOSIS — R53.83 OTHER FATIGUE: ICD-10-CM

## 2022-03-16 DIAGNOSIS — E11.69 TYPE 2 DIABETES MELLITUS WITH OTHER SPECIFIED COMPLICATION, UNSPECIFIED WHETHER LONG TERM INSULIN USE: ICD-10-CM

## 2022-03-16 DIAGNOSIS — R07.9 CHEST PAIN, UNSPECIFIED TYPE: ICD-10-CM

## 2022-03-16 DIAGNOSIS — R00.2 PALPITATIONS: Primary | ICD-10-CM

## 2022-03-16 DIAGNOSIS — I10 ESSENTIAL (PRIMARY) HYPERTENSION: ICD-10-CM

## 2022-03-16 LAB
T4 FREE SERPL-MCNC: 1.35 NG/DL (ref 0.93–1.7)
TESTOST SERPL-MCNC: 253 NG/DL (ref 193–740)
TSH SERPL DL<=0.05 MIU/L-ACNC: 5.68 UIU/ML (ref 0.27–4.2)
VIT B12 BLD-MCNC: 441 PG/ML (ref 211–946)

## 2022-03-18 ENCOUNTER — HOSPITAL ENCOUNTER (OUTPATIENT)
Dept: GENERAL RADIOLOGY | Facility: HOSPITAL | Age: 67
Discharge: HOME | End: 2022-03-18

## 2022-03-18 ENCOUNTER — HOSPITAL ENCOUNTER (OUTPATIENT)
Dept: CARDIOLOGY | Facility: HOSPITAL | Age: 67
Discharge: HOME | End: 2022-03-18

## 2022-03-18 DIAGNOSIS — E11.69 TYPE 2 DIABETES MELLITUS WITH OTHER SPECIFIED COMPLICATION, UNSPECIFIED WHETHER LONG TERM INSULIN USE: ICD-10-CM

## 2022-03-18 DIAGNOSIS — I10 ESSENTIAL (PRIMARY) HYPERTENSION: ICD-10-CM

## 2022-03-18 DIAGNOSIS — R00.2 PALPITATIONS: ICD-10-CM

## 2022-03-18 DIAGNOSIS — E66.01 MORBID OBESITY DUE TO EXCESS CALORIES: ICD-10-CM

## 2022-03-18 DIAGNOSIS — R53.83 OTHER FATIGUE: ICD-10-CM

## 2022-03-18 DIAGNOSIS — R07.9 CHEST PAIN, UNSPECIFIED TYPE: ICD-10-CM

## 2022-03-18 PROCEDURE — 93225 XTRNL ECG REC<48 HRS REC: CPT

## 2022-03-18 PROCEDURE — 71046 X-RAY EXAM CHEST 2 VIEWS: CPT

## 2022-03-23 LAB
MAXIMAL PREDICTED HEART RATE: 153 BPM
STRESS TARGET HR: 130 BPM

## 2022-03-23 PROCEDURE — 93227 XTRNL ECG REC<48 HR R&I: CPT | Performed by: INTERNAL MEDICINE

## 2022-03-25 ENCOUNTER — TRANSCRIBE ORDERS (OUTPATIENT)
Dept: ADMINISTRATIVE | Facility: HOSPITAL | Age: 67
End: 2022-03-25

## 2022-03-25 DIAGNOSIS — R07.9 CHEST PAIN, UNSPECIFIED TYPE: Primary | ICD-10-CM

## 2022-03-25 DIAGNOSIS — R94.31 ABNORMAL ELECTROCARDIOGRAM: ICD-10-CM

## 2022-03-25 DIAGNOSIS — R00.2 PALPITATIONS: ICD-10-CM

## 2022-03-25 DIAGNOSIS — R53.83 OTHER FATIGUE: ICD-10-CM

## 2022-04-01 ENCOUNTER — HOSPITAL ENCOUNTER (OUTPATIENT)
Dept: CARDIOLOGY | Facility: HOSPITAL | Age: 67
Discharge: HOME | End: 2022-04-01
Admitting: NURSE PRACTITIONER

## 2022-04-01 VITALS
SYSTOLIC BLOOD PRESSURE: 153 MMHG | HEART RATE: 61 BPM | BODY MASS INDEX: 40.75 KG/M2 | HEIGHT: 66 IN | WEIGHT: 253.53 LBS | DIASTOLIC BLOOD PRESSURE: 85 MMHG

## 2022-04-01 DIAGNOSIS — R94.31 ABNORMAL ELECTROCARDIOGRAM: ICD-10-CM

## 2022-04-01 DIAGNOSIS — R00.2 PALPITATIONS: ICD-10-CM

## 2022-04-01 DIAGNOSIS — R07.9 CHEST PAIN, UNSPECIFIED TYPE: ICD-10-CM

## 2022-04-01 DIAGNOSIS — R53.83 OTHER FATIGUE: ICD-10-CM

## 2022-04-01 LAB
BH CV STRESS BP STAGE 1: NORMAL
BH CV STRESS BP STAGE 2: NORMAL
BH CV STRESS DURATION MIN STAGE 1: 3
BH CV STRESS DURATION MIN STAGE 2: 1
BH CV STRESS DURATION SEC STAGE 1: 0
BH CV STRESS DURATION SEC STAGE 2: 54
BH CV STRESS GRADE STAGE 1: 10
BH CV STRESS GRADE STAGE 2: 12
BH CV STRESS HR STAGE 1: 97
BH CV STRESS HR STAGE 2: 120
BH CV STRESS METS STAGE 1: 5
BH CV STRESS METS STAGE 2: 7.5
BH CV STRESS PROTOCOL 1: NORMAL
BH CV STRESS RECOVERY BP: NORMAL MMHG
BH CV STRESS RECOVERY HR: 73 BPM
BH CV STRESS SPEED STAGE 1: 1.7
BH CV STRESS SPEED STAGE 2: 2.5
BH CV STRESS STAGE 1: 1
BH CV STRESS STAGE 2: 2
MAXIMAL PREDICTED HEART RATE: 153 BPM
PERCENT MAX PREDICTED HR: 78.43 %
STRESS BASELINE BP: NORMAL MMHG
STRESS BASELINE HR: 61 BPM
STRESS PERCENT HR: 92 %
STRESS POST ESTIMATED WORKLOAD: 7.5 METS
STRESS POST EXERCISE DUR MIN: 4 MIN
STRESS POST EXERCISE DUR SEC: 54 SEC
STRESS POST PEAK BP: NORMAL MMHG
STRESS POST PEAK HR: 120 BPM
STRESS TARGET HR: 130 BPM

## 2022-04-01 PROCEDURE — 93018 CV STRESS TEST I&R ONLY: CPT | Performed by: INTERNAL MEDICINE

## 2022-04-01 PROCEDURE — 93017 CV STRESS TEST TRACING ONLY: CPT

## 2022-04-06 ENCOUNTER — TRANSCRIBE ORDERS (OUTPATIENT)
Dept: ADMINISTRATIVE | Facility: HOSPITAL | Age: 67
End: 2022-04-06

## 2022-04-06 DIAGNOSIS — R94.31 NONSPECIFIC ABNORMAL ELECTROCARDIOGRAM (ECG) (EKG): ICD-10-CM

## 2022-04-06 DIAGNOSIS — R53.83 OTHER FATIGUE: ICD-10-CM

## 2022-04-06 DIAGNOSIS — R00.2 PALPITATIONS: ICD-10-CM

## 2022-04-06 DIAGNOSIS — R07.9 CHEST PAIN, UNSPECIFIED TYPE: Primary | ICD-10-CM

## 2022-05-06 ENCOUNTER — HOSPITAL ENCOUNTER (OUTPATIENT)
Dept: CARDIOLOGY | Facility: HOSPITAL | Age: 67
Discharge: HOME | End: 2022-05-06

## 2022-05-06 VITALS
DIASTOLIC BLOOD PRESSURE: 61 MMHG | HEART RATE: 58 BPM | HEIGHT: 67 IN | WEIGHT: 242.51 LBS | BODY MASS INDEX: 38.06 KG/M2 | SYSTOLIC BLOOD PRESSURE: 152 MMHG

## 2022-05-06 DIAGNOSIS — R94.31 NONSPECIFIC ABNORMAL ELECTROCARDIOGRAM (ECG) (EKG): ICD-10-CM

## 2022-05-06 DIAGNOSIS — R07.9 CHEST PAIN, UNSPECIFIED TYPE: ICD-10-CM

## 2022-05-06 DIAGNOSIS — R00.2 PALPITATIONS: ICD-10-CM

## 2022-05-06 DIAGNOSIS — R53.83 OTHER FATIGUE: ICD-10-CM

## 2022-05-06 PROCEDURE — A9500 TC99M SESTAMIBI: HCPCS | Performed by: NURSE PRACTITIONER

## 2022-05-06 PROCEDURE — 78452 HT MUSCLE IMAGE SPECT MULT: CPT | Performed by: INTERNAL MEDICINE

## 2022-05-06 PROCEDURE — 0 TECHNETIUM SESTAMIBI: Performed by: NURSE PRACTITIONER

## 2022-05-06 PROCEDURE — 78452 HT MUSCLE IMAGE SPECT MULT: CPT

## 2022-05-06 PROCEDURE — 93017 CV STRESS TEST TRACING ONLY: CPT

## 2022-05-06 PROCEDURE — 25010000002 REGADENOSON 0.4 MG/5ML SOLUTION: Performed by: INTERNAL MEDICINE

## 2022-05-06 PROCEDURE — 93018 CV STRESS TEST I&R ONLY: CPT | Performed by: INTERNAL MEDICINE

## 2022-05-06 RX ADMIN — TECHNETIUM TC 99M SESTAMIBI 1 DOSE: 1 INJECTION INTRAVENOUS at 10:07

## 2022-05-06 RX ADMIN — REGADENOSON 0.4 MG: 0.08 INJECTION, SOLUTION INTRAVENOUS at 10:06

## 2022-05-06 RX ADMIN — TECHNETIUM TC 99M SESTAMIBI 1 DOSE: 1 INJECTION INTRAVENOUS at 09:05

## 2022-05-09 LAB
BH CV NUCLEAR PRIOR STUDY: 3
BH CV REST NUCLEAR ISOTOPE DOSE: 11.2 MCI
BH CV STRESS BP STAGE 1: NORMAL
BH CV STRESS COMMENTS STAGE 1: NORMAL
BH CV STRESS DOSE REGADENOSON STAGE 1: 0.4
BH CV STRESS DURATION MIN STAGE 1: 0
BH CV STRESS DURATION SEC STAGE 1: 10
BH CV STRESS HR STAGE 1: 66
BH CV STRESS NUCLEAR ISOTOPE DOSE: 34.2 MCI
BH CV STRESS PROTOCOL 1: NORMAL
BH CV STRESS RECOVERY BP: NORMAL MMHG
BH CV STRESS RECOVERY HR: 64 BPM
BH CV STRESS STAGE 1: 1
LV EF NUC BP: 68 %
MAXIMAL PREDICTED HEART RATE: 153 BPM
PERCENT MAX PREDICTED HR: 43.14 %
STRESS BASELINE BP: NORMAL MMHG
STRESS BASELINE HR: 60 BPM
STRESS PERCENT HR: 51 %
STRESS POST EXERCISE DUR MIN: 0 MIN
STRESS POST EXERCISE DUR SEC: 10 SEC
STRESS POST PEAK BP: NORMAL MMHG
STRESS POST PEAK HR: 66 BPM
STRESS TARGET HR: 130 BPM

## 2022-10-18 ENCOUNTER — OFFICE VISIT (OUTPATIENT)
Dept: GASTROENTEROLOGY | Facility: CLINIC | Age: 67
End: 2022-10-18

## 2022-10-18 VITALS
HEIGHT: 67 IN | OXYGEN SATURATION: 98 % | HEART RATE: 65 BPM | DIASTOLIC BLOOD PRESSURE: 90 MMHG | BODY MASS INDEX: 39.93 KG/M2 | WEIGHT: 254.4 LBS | TEMPERATURE: 97.7 F | SYSTOLIC BLOOD PRESSURE: 140 MMHG

## 2022-10-18 DIAGNOSIS — K21.9 GASTROESOPHAGEAL REFLUX DISEASE, UNSPECIFIED WHETHER ESOPHAGITIS PRESENT: ICD-10-CM

## 2022-10-18 DIAGNOSIS — Z78.9 NONSMOKER: ICD-10-CM

## 2022-10-18 DIAGNOSIS — Z86.010 HX OF COLONIC POLYPS: Primary | ICD-10-CM

## 2022-10-18 DIAGNOSIS — I10 HTN (HYPERTENSION), BENIGN: ICD-10-CM

## 2022-10-18 DIAGNOSIS — R13.19 ESOPHAGEAL DYSPHAGIA: ICD-10-CM

## 2022-10-18 PROCEDURE — 99214 OFFICE O/P EST MOD 30 MIN: CPT | Performed by: CLINICAL NURSE SPECIALIST

## 2022-10-18 NOTE — PROGRESS NOTES
Rikki Amaya  1955      10/18/2022  Chief Complaint   Patient presents with   • GI Problem     Colonoscopy       Subjective   HPI  Rikki Amaya is a 66 y.o. male who presents as a referral for preventative maintenance. He has no complaints of nausea or vomiting. No change in bowels. No wt loss. No BRBPR. No melena. There is no family hx for colon cancer. No abdominal pain.  He has dysphagia with swallowing solid foods. He says that he has had this before with dilatation and this helped him. He says that it is located in the upper esophageal region. No nausea or vomiting. Worse with solids. Mild to moderate. He has reflux that is stable with Protonix. No burning or indigestion persistent. Worse with certain foods.     Past Medical History:   Diagnosis Date   • Basal cell carcinoma    • Hyperlipidemia    • Hypertension    • Kidney stone      Past Surgical History:   Procedure Laterality Date   • COLONOSCOPY  10/30/2015    Normal exam repeat in 5 years   • ENDOSCOPY  10/30/2015    Fundic glad polyp stomach   • GANGLION CYST EXCISION     • KNEE ARTHROPLASTY     • NECK SURGERY     • SHOULDER SURGERY     • SKIN CANCER EXCISION     • UMBILICAL HERNIA REPAIR       Outpatient Medications Marked as Taking for the 10/18/22 encounter (Office Visit) with Citlalli Brandt APRN   Medication Sig Dispense Refill   • amLODIPine (NORVASC) 10 MG tablet Take 10 mg by mouth Daily.     • betamethasone, augmented, (DIPROLENE) 0.05 % lotion Apply  topically Daily. As needed 15 mL 1   • Ibuprofen-Diphenhydramine Cit (ADVIL PM PO) Take  by mouth.     • lisinopril 10 MG tablet 20 mg, hydrochlorothiazide 25 MG tablet 12.5 mg Take  by mouth Daily.     • loratadine (CLARITIN) 10 MG tablet Take 10 mg by mouth Daily.     • metFORMIN (GLUCOPHAGE) 500 MG tablet Take 500 mg by mouth 2 (Two) Times a Day With Meals.     • metoprolol succinate XL (TOPROL-XL) 100 MG 24 hr tablet Take 100 mg by mouth Daily.     • oxymetazoline (AFRIN) 0.05  "% nasal spray 2 sprays into each nostril 2 (Two) Times a Day.     • pantoprazole (PROTONIX) 40 MG EC tablet Take 40 mg by mouth Daily.     • simvastatin (ZOCOR) 10 MG tablet Take 10 mg by mouth Every Night.       No Known Allergies  Social History     Socioeconomic History   • Marital status:    Tobacco Use   • Smoking status: Never   • Smokeless tobacco: Former     Types: Chew   Substance and Sexual Activity   • Alcohol use: No   • Drug use: No   • Sexual activity: Defer     Family History   Problem Relation Age of Onset   • Colon cancer Neg Hx      Health Maintenance   Topic Date Due   • URINE MICROALBUMIN  Never done   • COVID-19 Vaccine (1) Never done   • ANNUAL PHYSICAL  Never done   • Pneumococcal Vaccine 65+ (1 - PCV) Never done   • HEPATITIS C SCREENING  Never done   • DIABETIC FOOT EXAM  Never done   • LIPID PANEL  08/30/2017   • DIABETIC EYE EXAM  Never done   • HEMOGLOBIN A1C  03/11/2020   • COLORECTAL CANCER SCREENING  10/30/2025   • TDAP/TD VACCINES (2 - Td or Tdap) 08/08/2029   • INFLUENZA VACCINE  Completed   • ZOSTER VACCINE  Completed       REVIEW OF SYSTEMS  General: well appearing, no fever chills or sweats, no unexplained wt loss  HEENT: no acute visual or hearing disturbances  Cardiovascular: No chest pain or palpitations  Pulmonary: No shortness of breath, coughing, wheezing or hemoptysis  : No burning, urgency, hematuria, or dysuria  Musculoskeletal: No joint pain or stiffness  Peripheral: no edema  Skin: No lesions or rashes  Neuro: No dizziness, headaches, stroke, syncope  Endocrine: No hot or cold intolerances  Hematological: No blood dyscrasias    Objective   Vitals:    10/18/22 0838   BP: 140/90   Pulse: 65   Temp: 97.7 °F (36.5 °C)   SpO2: 98%   Weight: 115 kg (254 lb 6.4 oz)   Height: 170.2 cm (67\")     Body mass index is 39.84 kg/m².  Class 2 Severe Obesity (BMI >=35 and <=39.9). Obesity-related health conditions include the following: hypertension and diabetes mellitus. " Obesity is unchanged. BMI is is above average; BMI management plan is completed. We discussed portion control and increasing exercise.      PHYSICAL EXAM  General: age appropriate well nourished well appearing, no acute distress  Head: normocephalic and atraumatic  Global assessment-supple  Neck-No JVD noted, no lymphadenopathy  Pulmonary-clear to auscultation bilaterally, normal respiratory effort  Cardiovascular-normal rate and rhythm, normal heart sounds, S1 and S2 noted  Abdomen-soft, non tender, non distended, normal bowel sounds all 4 quadrants, no hepatosplenomegaly noted  Extremities-No clubbing cyanosis or edema  Neuro-Non focal, converses appropriately, awake, alert, oriented    Assessment & Plan     Diagnoses and all orders for this visit:    1. Hx of colonic polyps (Primary)  -     Case Request; Standing  -     Follow Anesthesia Guidelines / Standing Orders; Future  -     Obtain Informed Consent; Future  -     Implement Anesthesia Orders Day of Procedure; Standing  -     Obtain Informed Consent; Standing  -     Verify Bowel Prep Was Successful; Standing  -     Case Request    2. Esophageal dysphagia    3. Nonsmoker    4. HTN (hypertension), benign  Comments:  cont BP medication the day of procedure    5. Gastroesophageal reflux disease, unspecified whether esophagitis present    I discussed non pharmaceutical treatment of gerd.  This includes gradually losing weight to achieve ideal body wt., elevation of the head of bed by 4-6 inches, nothing to eat or drink 3 hours prior to lying down, avoiding tight clothing, stress reduction, tobacco cessation, reduction of alcohol intake, and dietary restrictions (avoiding caffeine, coffee, fatty foods, mints, chocolate, spicy foods and tomato based sauces as much as possible).    Continue Protonix    ESOPHAGOGASTRODUODENOSCOPY WITH ANESTHESIA (N/A), COLONOSCOPY WITH ANESTHESIA (N/A)  Body mass index is 39.84 kg/m².    Patient instructions on prep prior to procedure  provided to the patient.    All risks, benefits, alternatives, and indications of colonoscopy procedure have been discussed with the patient. Risks to include perforation of the colon requiring possible surgery or colostomy, risk of bleeding from biopsies or removal of colon tissue, possibility of missing a colon polyp or cancer, or adverse drug reaction.  Benefits to include the diagnosis and management of disease of the colon and rectum. Alternatives to include barium enema, radiographic evaluation, lab testing or no intervention. Pt verbalizes understanding and agrees.     YANNI Carrera  10/18/2022  09:20 CDT      IF YOU SMOKE OR USE TOBACCO PLEASE READ THE FOLLOWIN minutes reading provided    Why is smoking bad for me?  Smoking increases the risk of heart disease, lung disease, vascular disease, stroke, and cancer.     If you smoke, STOP!    If you would like more information on quitting smoking, please visit the [employer] website: [email or web address]   This link will provide additional resources including the QUIT line and the Beat the Pack support groups.     For more information:    Quit Now [place]  -QUIT-NOW  [email or web address]

## 2022-11-22 ENCOUNTER — ANESTHESIA EVENT (OUTPATIENT)
Dept: GASTROENTEROLOGY | Facility: HOSPITAL | Age: 67
End: 2022-11-22

## 2022-11-22 ENCOUNTER — HOSPITAL ENCOUNTER (OUTPATIENT)
Facility: HOSPITAL | Age: 67
Setting detail: HOSPITAL OUTPATIENT SURGERY
Discharge: HOME | End: 2022-11-22
Attending: INTERNAL MEDICINE | Admitting: INTERNAL MEDICINE

## 2022-11-22 ENCOUNTER — ANESTHESIA (OUTPATIENT)
Dept: GASTROENTEROLOGY | Facility: HOSPITAL | Age: 67
End: 2022-11-22

## 2022-11-22 VITALS
HEIGHT: 67 IN | HEART RATE: 62 BPM | DIASTOLIC BLOOD PRESSURE: 63 MMHG | RESPIRATION RATE: 18 BRPM | OXYGEN SATURATION: 96 % | WEIGHT: 251 LBS | TEMPERATURE: 97.2 F | SYSTOLIC BLOOD PRESSURE: 110 MMHG | BODY MASS INDEX: 39.39 KG/M2

## 2022-11-22 DIAGNOSIS — Z86.010 HX OF COLONIC POLYPS: ICD-10-CM

## 2022-11-22 PROCEDURE — 25010000002 PROPOFOL 10 MG/ML EMULSION: Performed by: NURSE ANESTHETIST, CERTIFIED REGISTERED

## 2022-11-22 PROCEDURE — 43239 EGD BIOPSY SINGLE/MULTIPLE: CPT | Performed by: INTERNAL MEDICINE

## 2022-11-22 PROCEDURE — 88305 TISSUE EXAM BY PATHOLOGIST: CPT | Performed by: INTERNAL MEDICINE

## 2022-11-22 PROCEDURE — 45385 COLONOSCOPY W/LESION REMOVAL: CPT | Performed by: INTERNAL MEDICINE

## 2022-11-22 PROCEDURE — 43450 DILATE ESOPHAGUS 1/MULT PASS: CPT | Performed by: INTERNAL MEDICINE

## 2022-11-22 RX ORDER — LIDOCAINE HYDROCHLORIDE 10 MG/ML
0.5 INJECTION, SOLUTION EPIDURAL; INFILTRATION; INTRACAUDAL; PERINEURAL ONCE AS NEEDED
Status: DISCONTINUED | OUTPATIENT
Start: 2022-11-22 | End: 2022-11-22 | Stop reason: HOSPADM

## 2022-11-22 RX ORDER — SODIUM CHLORIDE 0.9 % (FLUSH) 0.9 %
10 SYRINGE (ML) INJECTION AS NEEDED
Status: DISCONTINUED | OUTPATIENT
Start: 2022-11-22 | End: 2022-11-22 | Stop reason: HOSPADM

## 2022-11-22 RX ORDER — SODIUM CHLORIDE 9 MG/ML
500 INJECTION, SOLUTION INTRAVENOUS CONTINUOUS PRN
Status: DISCONTINUED | OUTPATIENT
Start: 2022-11-22 | End: 2022-11-22 | Stop reason: HOSPADM

## 2022-11-22 RX ORDER — PROPOFOL 10 MG/ML
VIAL (ML) INTRAVENOUS AS NEEDED
Status: DISCONTINUED | OUTPATIENT
Start: 2022-11-22 | End: 2022-11-22 | Stop reason: SURG

## 2022-11-22 RX ORDER — LIDOCAINE HYDROCHLORIDE 20 MG/ML
INJECTION, SOLUTION EPIDURAL; INFILTRATION; INTRACAUDAL; PERINEURAL AS NEEDED
Status: DISCONTINUED | OUTPATIENT
Start: 2022-11-22 | End: 2022-11-22 | Stop reason: SURG

## 2022-11-22 RX ADMIN — GLYCOPYRROLATE 0.1 MG: 0.2 INJECTION INTRAMUSCULAR; INTRAVENOUS at 09:31

## 2022-11-22 RX ADMIN — PROPOFOL 700 MG: 10 INJECTION, EMULSION INTRAVENOUS at 09:35

## 2022-11-22 RX ADMIN — SODIUM CHLORIDE 500 ML: 9 INJECTION, SOLUTION INTRAVENOUS at 09:04

## 2022-11-22 RX ADMIN — LIDOCAINE HYDROCHLORIDE 200 MG: 20 INJECTION, SOLUTION EPIDURAL; INFILTRATION; INTRACAUDAL; PERINEURAL at 09:35

## 2022-11-22 NOTE — H&P
[facility] Gastroenterology  Pre Procedure History & Physical    Chief Complaint:   Aphasia, history of polyps    Subjective     HPI:   Here for colonoscopy.  Dysphagia.  Intermittent.  History of polyps    Past Medical History:   Past Medical History:   Diagnosis Date   • Basal cell carcinoma    • Hyperlipidemia    • Hypertension    • Kidney stone        Past Surgical History:  Past Surgical History:   Procedure Laterality Date   • COLONOSCOPY  10/30/2015    Normal exam repeat in 5 years   • ENDOSCOPY  10/30/2015    Fundic glad polyp stomach   • GANGLION CYST EXCISION     • KNEE ARTHROPLASTY     • NECK SURGERY     • SHOULDER SURGERY     • SKIN CANCER EXCISION     • UMBILICAL HERNIA REPAIR         Family History:  Family History   Problem Relation Age of Onset   • Hypertension Mother    • Melanoma Father    • Colon cancer Neg Hx        Social History:   reports that he has never smoked. He has quit using smokeless tobacco.  His smokeless tobacco use included chew. He reports that he does not drink alcohol and does not use drugs.    Medications:   Prior to Admission medications    Medication Sig Start Date End Date Taking? Authorizing Provider   amLODIPine (NORVASC) 10 MG tablet Take 10 mg by mouth Daily.   Yes Emergency, Nurse Juan David RN   lisinopril 10 MG tablet 20 mg, hydrochlorothiazide 25 MG tablet 12.5 mg Take  by mouth Daily.   Yes Emergency, Nurse NATALIE Fall   metoprolol succinate XL (TOPROL-XL) 100 MG 24 hr tablet Take 100 mg by mouth Daily.   Yes Emergency, Nurse Juan David RN   oxymetazoline (AFRIN) 0.05 % nasal spray 2 sprays into each nostril 2 (Two) Times a Day.   Yes Provider, MD Kameron   pantoprazole (PROTONIX) 40 MG EC tablet Take 40 mg by mouth Daily.   Yes Emergency, Nurse Juan David RN   simvastatin (ZOCOR) 10 MG tablet Take 10 mg by mouth Every Night.   Yes Emergency, Nurse Epic, RN   betamethasone, augmented, (DIPROLENE) 0.05 % lotion Apply  topically Daily. As needed 2/15/18   Garry Hernandez MD  "  Ibuprofen-Diphenhydramine Cit (ADVIL PM PO) Take  by mouth.    Provider, MD Kameron   loratadine (CLARITIN) 10 MG tablet Take 10 mg by mouth Daily.    Provider, MD Kameron   metFORMIN (GLUCOPHAGE) 500 MG tablet Take 500 mg by mouth 2 (Two) Times a Day With Meals.    Provider, MD Kameron   polyethylene glycol (GoLYTELY) 236 g solution Take as directed by office instructions. 10/18/22 11/22/22  Citlalli Brandt APRN       Allergies:  Patient has no known allergies.    Objective     Blood pressure (!) 184/78, pulse 65, temperature 97.2 °F (36.2 °C), temperature source Temporal, resp. rate 20, height 170.2 cm (67\"), weight 114 kg (251 lb), SpO2 95 %.    Physical Exam   Constitutional: Pt is oriented to person, place, and in no distress.   HENT: Mouth/Throat: Oropharynx is clear.   Cardiovascular: Normal rate, regular rhythm.    Pulmonary/Chest: Effort normal. No respiratory distress. No  wheezes.   Abdominal: Soft. Non-distended.  Skin: Skin is warm and dry.   Psychiatric: Mood, memory, affect and judgment appear normal.     Assessment & Plan     Diagnosis:  Dysphagia, history of polyps    Anticipated Surgical Procedure:    Proceed with endoscopy and colonoscopy as scheduled    The following major R/B/A were discussed with the patient, however the list is not all inclusive . Risk:  Bleeding (immediate and delayed), perforation (rupture or tear), reaction to medication, missed lesion/cancer, pain during the procedure, infection, need for surgery, need for ostomy, need for mechanical ventilation (breathing machine), death.  Benefits: removal of polyp/tissue, burn/clip/or inject to stop bleeding, removal of foreign body, dilate any stricture.  Alternatives: Xray or CT, surgery, do nothing with associated risk   The patient was given time to ask question and received explanation, and agrees to proceed as per History and Physical.   No guarantee given or expressed.    EMR Dragon/transcription disclaimer: " Much of this encounter note is an electronic transcription/translation of spoken language to printed text.  The electronic translation of spoken language may permit erroneous, or at times, nonsensical words or phrases to be inadvertently transcribed.  Although I have reviewed the note for such errors, some may still exist.    Kelvin Mello MD  09:29 CST  11/22/2022

## 2022-11-22 NOTE — ANESTHESIA PREPROCEDURE EVALUATION
Anesthesia Evaluation     Patient summary reviewed   no history of anesthetic complications:  NPO Solid Status: > 8 hours             Airway   Mallampati: II  Dental      Pulmonary - negative pulmonary ROS   Cardiovascular   Exercise tolerance: excellent (>7 METS)    (+) hypertension, hyperlipidemia,       Neuro/Psych- negative ROS  GI/Hepatic/Renal/Endo    (+)  GERD,  diabetes mellitus,     Musculoskeletal     Abdominal    Substance History      OB/GYN          Other                        Anesthesia Plan    ASA 2     MAC       Anesthetic plan, risks, benefits, and alternatives have been provided, discussed and informed consent has been obtained with: patient.        CODE STATUS:

## 2022-11-22 NOTE — ANESTHESIA POSTPROCEDURE EVALUATION
Patient: Rikki Amaya    Procedure Summary     Date: 11/22/22 Room / Location: [facility] ENDOSCOPY 2 /  [facility]    Anesthesia Start: 0931 Anesthesia Stop: 1010    Procedures:       ESOPHAGOGASTRODUODENOSCOPY WITH ANESTHESIA      COLONOSCOPY WITH ANESTHESIA Diagnosis:       Hx of colonic polyps      (Hx of colonic polyps [Z86.010])    Surgeons: Kelvin Mello MD Provider: Brittney Connor CRNA    Anesthesia Type: MAC ASA Status: 2          Anesthesia Type: MAC    Vitals  Vitals Value Taken Time   /63 11/22/22 1026   Temp     Pulse 59 11/22/22 1026   Resp 18 11/22/22 1025   SpO2 97 % 11/22/22 1026   Vitals shown include unvalidated device data.        Post Anesthesia Care and Evaluation    Patient location during evaluation: PHASE II  Patient participation: complete - patient participated  Level of consciousness: awake and alert  Pain management: adequate    Airway patency: patent  Anesthetic complications: No anesthetic complications  PONV Status: none  Cardiovascular status: acceptable  Respiratory status: acceptable  Hydration status: acceptable  No anesthesia care post op

## 2022-11-23 LAB
CYTO UR: NORMAL
LAB AP CASE REPORT: NORMAL
Lab: NORMAL
PATH REPORT.FINAL DX SPEC: NORMAL
PATH REPORT.GROSS SPEC: NORMAL

## 2024-03-10 ENCOUNTER — HOSPITAL ENCOUNTER (INPATIENT)
Facility: HOSPITAL | Age: 69
LOS: 1 days | Discharge: HOME | DRG: 066 | End: 2024-03-11
Attending: STUDENT IN AN ORGANIZED HEALTH CARE EDUCATION/TRAINING PROGRAM | Admitting: FAMILY MEDICINE
Payer: COMMERCIAL

## 2024-03-10 ENCOUNTER — APPOINTMENT (OUTPATIENT)
Dept: MRI IMAGING | Facility: HOSPITAL | Age: 69
DRG: 066 | End: 2024-03-10
Payer: COMMERCIAL

## 2024-03-10 ENCOUNTER — APPOINTMENT (OUTPATIENT)
Dept: CT IMAGING | Facility: HOSPITAL | Age: 69
DRG: 066 | End: 2024-03-10
Payer: COMMERCIAL

## 2024-03-10 ENCOUNTER — APPOINTMENT (OUTPATIENT)
Dept: CARDIOLOGY | Facility: HOSPITAL | Age: 69
DRG: 066 | End: 2024-03-10
Payer: COMMERCIAL

## 2024-03-10 DIAGNOSIS — Z74.09 IMPAIRED FUNCTIONAL MOBILITY AND ACTIVITY TOLERANCE: ICD-10-CM

## 2024-03-10 DIAGNOSIS — R29.90 STROKE-LIKE SYMPTOMS: ICD-10-CM

## 2024-03-10 DIAGNOSIS — I48.91 ATRIAL FIBRILLATION, UNSPECIFIED TYPE: ICD-10-CM

## 2024-03-10 DIAGNOSIS — R13.12 OROPHARYNGEAL DYSPHAGIA: ICD-10-CM

## 2024-03-10 DIAGNOSIS — R29.810 FACIAL DROOP: Primary | ICD-10-CM

## 2024-03-10 DIAGNOSIS — I63.9 CEREBROVASCULAR ACCIDENT (CVA), UNSPECIFIED MECHANISM: ICD-10-CM

## 2024-03-10 PROBLEM — G45.9 TIA (TRANSIENT ISCHEMIC ATTACK): Status: ACTIVE | Noted: 2024-03-10

## 2024-03-10 LAB
AMPHET+METHAMPHET UR QL: NEGATIVE
AMPHETAMINES UR QL: NEGATIVE
ANION GAP SERPL CALCULATED.3IONS-SCNC: 12 MMOL/L (ref 5–15)
ARTICHOKE IGE QN: 111 MG/DL (ref 0–100)
BARBITURATES UR QL SCN: NEGATIVE
BASOPHILS # BLD AUTO: 0.03 10*3/MM3 (ref 0–0.2)
BASOPHILS NFR BLD AUTO: 0.5 % (ref 0–1.5)
BENZODIAZ UR QL SCN: NEGATIVE
BH CV ECHO MEAS - AO MAX PG: 5.2 MMHG
BH CV ECHO MEAS - AO MEAN PG: 3 MMHG
BH CV ECHO MEAS - AO ROOT DIAM: 2.9 CM
BH CV ECHO MEAS - AO V2 MAX: 114 CM/SEC
BH CV ECHO MEAS - AO V2 VTI: 22.2 CM
BH CV ECHO MEAS - AVA(I,D): 2.8 CM2
BH CV ECHO MEAS - EDV(CUBED): 85.8 ML
BH CV ECHO MEAS - EDV(MOD-SP4): 115 ML
BH CV ECHO MEAS - EF(MOD-SP4): 63.8 %
BH CV ECHO MEAS - ESV(CUBED): 28.4 ML
BH CV ECHO MEAS - ESV(MOD-SP4): 41.6 ML
BH CV ECHO MEAS - FS: 30.8 %
BH CV ECHO MEAS - IVS/LVPW: 1.1 CM
BH CV ECHO MEAS - IVSD: 1.31 CM
BH CV ECHO MEAS - LA DIMENSION: 4.1 CM
BH CV ECHO MEAS - LAT PEAK E' VEL: 12.4 CM/SEC
BH CV ECHO MEAS - LV DIASTOLIC VOL/BSA (35-75): 51.5 CM2
BH CV ECHO MEAS - LV MASS(C)D: 203.8 GRAMS
BH CV ECHO MEAS - LV MAX PG: 3.9 MMHG
BH CV ECHO MEAS - LV MEAN PG: 2 MMHG
BH CV ECHO MEAS - LV SYSTOLIC VOL/BSA (12-30): 18.6 CM2
BH CV ECHO MEAS - LV V1 MAX: 98.5 CM/SEC
BH CV ECHO MEAS - LV V1 VTI: 18.2 CM
BH CV ECHO MEAS - LVIDD: 4.4 CM
BH CV ECHO MEAS - LVIDS: 3.1 CM
BH CV ECHO MEAS - LVOT AREA: 3.5 CM2
BH CV ECHO MEAS - LVOT DIAM: 2.1 CM
BH CV ECHO MEAS - LVPWD: 1.19 CM
BH CV ECHO MEAS - MED PEAK E' VEL: 7.8 CM/SEC
BH CV ECHO MEAS - MV DEC TIME: 0.2 SEC
BH CV ECHO MEAS - MV E MAX VEL: 109 CM/SEC
BH CV ECHO MEAS - SI(MOD-SP4): 32.9 ML/M2
BH CV ECHO MEAS - SV(LVOT): 63 ML
BH CV ECHO MEAS - SV(MOD-SP4): 73.4 ML
BH CV ECHO MEASUREMENTS AVERAGE E/E' RATIO: 10.79
BH CV ECHO SHUNT ASSESSMENT PERFORMED (HIDDEN SCRIPTING): 1
BUN SERPL-MCNC: 16 MG/DL (ref 8–23)
BUN/CREAT SERPL: 17.2 (ref 7–25)
BUPRENORPHINE SERPL-MCNC: NEGATIVE NG/ML
CALCIUM SPEC-SCNC: 9.2 MG/DL (ref 8.6–10.5)
CANNABINOIDS SERPL QL: NEGATIVE
CHLORIDE SERPL-SCNC: 101 MMOL/L (ref 98–107)
CHOLEST SERPL-MCNC: 165 MG/DL (ref 0–200)
CO2 SERPL-SCNC: 24 MMOL/L (ref 22–29)
COCAINE UR QL: NEGATIVE
CREAT SERPL-MCNC: 0.93 MG/DL (ref 0.76–1.27)
DEPRECATED RDW RBC AUTO: 36.4 FL (ref 37–54)
EGFRCR SERPLBLD CKD-EPI 2021: 88.9 ML/MIN/1.73
EOSINOPHIL # BLD AUTO: 0.11 10*3/MM3 (ref 0–0.4)
EOSINOPHIL NFR BLD AUTO: 1.7 % (ref 0.3–6.2)
ERYTHROCYTE [DISTWIDTH] IN BLOOD BY AUTOMATED COUNT: 11.6 % (ref 12.3–15.4)
FENTANYL UR-MCNC: NEGATIVE NG/ML
GLUCOSE BLDC GLUCOMTR-MCNC: 216 MG/DL (ref 70–130)
GLUCOSE BLDC GLUCOMTR-MCNC: 228 MG/DL (ref 70–130)
GLUCOSE BLDC GLUCOMTR-MCNC: 256 MG/DL (ref 70–130)
GLUCOSE BLDC GLUCOMTR-MCNC: 257 MG/DL (ref 70–130)
GLUCOSE BLDC GLUCOMTR-MCNC: 269 MG/DL (ref 70–130)
GLUCOSE SERPL-MCNC: 286 MG/DL (ref 65–99)
HBA1C MFR BLD: 8.7 % (ref 4.8–5.6)
HCT VFR BLD AUTO: 43.1 % (ref 37.5–51)
HDLC SERPL-MCNC: 31 MG/DL (ref 40–60)
HGB BLD-MCNC: 15.5 G/DL (ref 13–17.7)
HOLD SPECIMEN: NORMAL
HOLD SPECIMEN: NORMAL
IMM GRANULOCYTES # BLD AUTO: 0.03 10*3/MM3 (ref 0–0.05)
IMM GRANULOCYTES NFR BLD AUTO: 0.5 % (ref 0–0.5)
LDLC SERPL CALC-MCNC: 107 MG/DL (ref 0–100)
LDLC/HDLC SERPL: 3.37 {RATIO}
LEFT ATRIUM VOLUME INDEX: 25.8 ML/M2
LEFT ATRIUM VOLUME: 57.6 ML
LYMPHOCYTES # BLD AUTO: 1.65 10*3/MM3 (ref 0.7–3.1)
LYMPHOCYTES NFR BLD AUTO: 25.2 % (ref 19.6–45.3)
MAGNESIUM SERPL-MCNC: 2 MG/DL (ref 1.6–2.4)
MCH RBC QN AUTO: 31.1 PG (ref 26.6–33)
MCHC RBC AUTO-ENTMCNC: 36 G/DL (ref 31.5–35.7)
MCV RBC AUTO: 86.5 FL (ref 79–97)
METHADONE UR QL SCN: NEGATIVE
MONOCYTES # BLD AUTO: 0.65 10*3/MM3 (ref 0.1–0.9)
MONOCYTES NFR BLD AUTO: 9.9 % (ref 5–12)
NEUTROPHILS NFR BLD AUTO: 4.07 10*3/MM3 (ref 1.7–7)
NEUTROPHILS NFR BLD AUTO: 62.2 % (ref 42.7–76)
NRBC BLD AUTO-RTO: 0 /100 WBC (ref 0–0.2)
NT-PROBNP SERPL-MCNC: 1665 PG/ML (ref 0–900)
OPIATES UR QL: NEGATIVE
OXYCODONE UR QL SCN: NEGATIVE
PCP UR QL SCN: NEGATIVE
PLATELET # BLD AUTO: 160 10*3/MM3 (ref 140–450)
PMV BLD AUTO: 10.4 FL (ref 6–12)
POTASSIUM SERPL-SCNC: 4.4 MMOL/L (ref 3.5–5.2)
QT INTERVAL: 368 MS
QTC INTERVAL: 424 MS
RBC # BLD AUTO: 4.98 10*6/MM3 (ref 4.14–5.8)
SODIUM SERPL-SCNC: 137 MMOL/L (ref 136–145)
TRICYCLICS UR QL SCN: NEGATIVE
TRIGL SERPL-MCNC: 148 MG/DL (ref 0–150)
TROPONIN T SERPL HS-MCNC: 10 NG/L
VLDLC SERPL-MCNC: 27 MG/DL (ref 5–40)
WBC NRBC COR # BLD AUTO: 6.54 10*3/MM3 (ref 3.4–10.8)
WHOLE BLOOD HOLD COAG: NORMAL
WHOLE BLOOD HOLD SPECIMEN: NORMAL

## 2024-03-10 PROCEDURE — 80048 BASIC METABOLIC PNL TOTAL CA: CPT | Performed by: STUDENT IN AN ORGANIZED HEALTH CARE EDUCATION/TRAINING PROGRAM

## 2024-03-10 PROCEDURE — 84484 ASSAY OF TROPONIN QUANT: CPT | Performed by: STUDENT IN AN ORGANIZED HEALTH CARE EDUCATION/TRAINING PROGRAM

## 2024-03-10 PROCEDURE — 82948 REAGENT STRIP/BLOOD GLUCOSE: CPT

## 2024-03-10 PROCEDURE — 83880 ASSAY OF NATRIURETIC PEPTIDE: CPT | Performed by: STUDENT IN AN ORGANIZED HEALTH CARE EDUCATION/TRAINING PROGRAM

## 2024-03-10 PROCEDURE — 83721 ASSAY OF BLOOD LIPOPROTEIN: CPT | Performed by: PSYCHIATRY & NEUROLOGY

## 2024-03-10 PROCEDURE — 70498 CT ANGIOGRAPHY NECK: CPT

## 2024-03-10 PROCEDURE — 63710000001 INSULIN DETEMIR PER 5 UNITS: Performed by: FAMILY MEDICINE

## 2024-03-10 PROCEDURE — 92610 EVALUATE SWALLOWING FUNCTION: CPT | Performed by: SPEECH-LANGUAGE PATHOLOGIST

## 2024-03-10 PROCEDURE — 99285 EMERGENCY DEPT VISIT HI MDM: CPT

## 2024-03-10 PROCEDURE — 70551 MRI BRAIN STEM W/O DYE: CPT

## 2024-03-10 PROCEDURE — 83735 ASSAY OF MAGNESIUM: CPT | Performed by: STUDENT IN AN ORGANIZED HEALTH CARE EDUCATION/TRAINING PROGRAM

## 2024-03-10 PROCEDURE — 25510000001 IOPAMIDOL PER 1 ML: Performed by: FAMILY MEDICINE

## 2024-03-10 PROCEDURE — 4A03X5D MEASUREMENT OF ARTERIAL FLOW, INTRACRANIAL, EXTERNAL APPROACH: ICD-10-PCS | Performed by: RADIOLOGY

## 2024-03-10 PROCEDURE — 83036 HEMOGLOBIN GLYCOSYLATED A1C: CPT | Performed by: FAMILY MEDICINE

## 2024-03-10 PROCEDURE — 70450 CT HEAD/BRAIN W/O DYE: CPT

## 2024-03-10 PROCEDURE — 97165 OT EVAL LOW COMPLEX 30 MIN: CPT | Performed by: OCCUPATIONAL THERAPIST

## 2024-03-10 PROCEDURE — 99223 1ST HOSP IP/OBS HIGH 75: CPT | Performed by: PSYCHIATRY & NEUROLOGY

## 2024-03-10 PROCEDURE — 97162 PT EVAL MOD COMPLEX 30 MIN: CPT

## 2024-03-10 PROCEDURE — 93306 TTE W/DOPPLER COMPLETE: CPT | Performed by: EMERGENCY MEDICINE

## 2024-03-10 PROCEDURE — 97535 SELF CARE MNGMENT TRAINING: CPT | Performed by: OCCUPATIONAL THERAPIST

## 2024-03-10 PROCEDURE — 25510000001 PERFLUTREN 6.52 MG/ML SUSPENSION: Performed by: FAMILY MEDICINE

## 2024-03-10 PROCEDURE — 80061 LIPID PANEL: CPT | Performed by: FAMILY MEDICINE

## 2024-03-10 PROCEDURE — 93306 TTE W/DOPPLER COMPLETE: CPT

## 2024-03-10 PROCEDURE — 80307 DRUG TEST PRSMV CHEM ANLYZR: CPT | Performed by: PSYCHIATRY & NEUROLOGY

## 2024-03-10 PROCEDURE — 0042T HC CT CEREBRAL PERFUSION W/WO CONTRAST: CPT

## 2024-03-10 PROCEDURE — 70496 CT ANGIOGRAPHY HEAD: CPT

## 2024-03-10 PROCEDURE — 93005 ELECTROCARDIOGRAM TRACING: CPT

## 2024-03-10 PROCEDURE — 85025 COMPLETE CBC W/AUTO DIFF WBC: CPT | Performed by: STUDENT IN AN ORGANIZED HEALTH CARE EDUCATION/TRAINING PROGRAM

## 2024-03-10 PROCEDURE — 63710000001 INSULIN REGULAR HUMAN PER 5 UNITS: Performed by: FAMILY MEDICINE

## 2024-03-10 PROCEDURE — 93010 ELECTROCARDIOGRAM REPORT: CPT | Performed by: EMERGENCY MEDICINE

## 2024-03-10 RX ORDER — DEXTROSE MONOHYDRATE 25 G/50ML
25 INJECTION, SOLUTION INTRAVENOUS
Status: DISCONTINUED | OUTPATIENT
Start: 2024-03-10 | End: 2024-03-11 | Stop reason: HOSPADM

## 2024-03-10 RX ORDER — NICOTINE POLACRILEX 4 MG
15 LOZENGE BUCCAL
Status: DISCONTINUED | OUTPATIENT
Start: 2024-03-10 | End: 2024-03-11 | Stop reason: HOSPADM

## 2024-03-10 RX ORDER — LEVOTHYROXINE SODIUM 0.03 MG/1
25 TABLET ORAL DAILY
COMMUNITY

## 2024-03-10 RX ORDER — SODIUM CHLORIDE 9 MG/ML
40 INJECTION, SOLUTION INTRAVENOUS AS NEEDED
Status: DISCONTINUED | OUTPATIENT
Start: 2024-03-10 | End: 2024-03-11 | Stop reason: HOSPADM

## 2024-03-10 RX ORDER — ATORVASTATIN CALCIUM 40 MG/1
80 TABLET, FILM COATED ORAL NIGHTLY
Status: DISCONTINUED | OUTPATIENT
Start: 2024-03-10 | End: 2024-03-11 | Stop reason: HOSPADM

## 2024-03-10 RX ORDER — SODIUM CHLORIDE 0.9 % (FLUSH) 0.9 %
10 SYRINGE (ML) INJECTION EVERY 12 HOURS SCHEDULED
Status: DISCONTINUED | OUTPATIENT
Start: 2024-03-10 | End: 2024-03-11 | Stop reason: HOSPADM

## 2024-03-10 RX ORDER — ACETAMINOPHEN 650 MG/1
650 SUPPOSITORY RECTAL EVERY 4 HOURS PRN
Status: DISCONTINUED | OUTPATIENT
Start: 2024-03-10 | End: 2024-03-11 | Stop reason: HOSPADM

## 2024-03-10 RX ORDER — MELOXICAM 15 MG/1
15 TABLET ORAL DAILY
COMMUNITY
End: 2024-03-11 | Stop reason: HOSPADM

## 2024-03-10 RX ORDER — AMLODIPINE BESYLATE 10 MG/1
10 TABLET ORAL DAILY
Status: DISCONTINUED | OUTPATIENT
Start: 2024-03-11 | End: 2024-03-11 | Stop reason: HOSPADM

## 2024-03-10 RX ORDER — METOPROLOL SUCCINATE 100 MG/1
100 TABLET, EXTENDED RELEASE ORAL DAILY
Status: DISCONTINUED | OUTPATIENT
Start: 2024-03-10 | End: 2024-03-11 | Stop reason: HOSPADM

## 2024-03-10 RX ORDER — LABETALOL HYDROCHLORIDE 5 MG/ML
10 INJECTION, SOLUTION INTRAVENOUS EVERY 4 HOURS PRN
Status: DISCONTINUED | OUTPATIENT
Start: 2024-03-10 | End: 2024-03-11 | Stop reason: HOSPADM

## 2024-03-10 RX ORDER — IBUPROFEN 600 MG/1
1 TABLET ORAL
Status: DISCONTINUED | OUTPATIENT
Start: 2024-03-10 | End: 2024-03-11 | Stop reason: HOSPADM

## 2024-03-10 RX ORDER — OLMESARTAN MEDOXOMIL AND HYDROCHLOROTHIAZIDE 40/25 40; 25 MG/1; MG/1
1 TABLET ORAL DAILY
COMMUNITY

## 2024-03-10 RX ORDER — ASPIRIN 81 MG/1
81 TABLET, CHEWABLE ORAL DAILY
Status: DISCONTINUED | OUTPATIENT
Start: 2024-03-10 | End: 2024-03-10

## 2024-03-10 RX ORDER — PANTOPRAZOLE SODIUM 40 MG/1
40 TABLET, DELAYED RELEASE ORAL DAILY
Status: DISCONTINUED | OUTPATIENT
Start: 2024-03-10 | End: 2024-03-11 | Stop reason: HOSPADM

## 2024-03-10 RX ORDER — ACETAMINOPHEN 325 MG/1
650 TABLET ORAL EVERY 4 HOURS PRN
Status: DISCONTINUED | OUTPATIENT
Start: 2024-03-10 | End: 2024-03-11 | Stop reason: HOSPADM

## 2024-03-10 RX ORDER — ASPIRIN 300 MG/1
300 SUPPOSITORY RECTAL DAILY
Status: DISCONTINUED | OUTPATIENT
Start: 2024-03-10 | End: 2024-03-10

## 2024-03-10 RX ORDER — SODIUM CHLORIDE 0.9 % (FLUSH) 0.9 %
10 SYRINGE (ML) INJECTION AS NEEDED
Status: DISCONTINUED | OUTPATIENT
Start: 2024-03-10 | End: 2024-03-11 | Stop reason: HOSPADM

## 2024-03-10 RX ADMIN — APIXABAN 5 MG: 5 TABLET, FILM COATED ORAL at 20:42

## 2024-03-10 RX ADMIN — METOPROLOL SUCCINATE 100 MG: 100 TABLET, FILM COATED, EXTENDED RELEASE ORAL at 10:20

## 2024-03-10 RX ADMIN — INSULIN HUMAN 3 UNITS: 100 INJECTION, SOLUTION PARENTERAL at 17:49

## 2024-03-10 RX ADMIN — INSULIN HUMAN 4 UNITS: 100 INJECTION, SOLUTION PARENTERAL at 13:03

## 2024-03-10 RX ADMIN — ATORVASTATIN CALCIUM 80 MG: 40 TABLET ORAL at 20:42

## 2024-03-10 RX ADMIN — INSULIN DETEMIR 10 UNITS: 100 INJECTION, SOLUTION SUBCUTANEOUS at 14:46

## 2024-03-10 RX ADMIN — IOPAMIDOL 125 ML: 755 INJECTION, SOLUTION INTRAVENOUS at 12:50

## 2024-03-10 RX ADMIN — Medication 10 ML: at 20:42

## 2024-03-10 RX ADMIN — Medication 10 ML: at 10:21

## 2024-03-10 RX ADMIN — ASPIRIN 81 MG: 81 TABLET, CHEWABLE ORAL at 10:19

## 2024-03-10 RX ADMIN — APIXABAN 5 MG: 5 TABLET, FILM COATED ORAL at 14:46

## 2024-03-10 RX ADMIN — PERFLUTREN 8.48 MG: 6.52 INJECTION, SUSPENSION INTRAVENOUS at 14:35

## 2024-03-10 RX ADMIN — PANTOPRAZOLE SODIUM 40 MG: 40 TABLET, DELAYED RELEASE ORAL at 10:18

## 2024-03-10 NOTE — CASE MANAGEMENT/SOCIAL WORK
Continued Stay Note  [facility]     Patient Name: Rikki Amaya  MRN: [id number]  Today's Date: 3/10/2024    Admit Date: 3/10/2024        Discharge Plan       Row Name 03/10/24 1410       Plan    Plan Comments Pt is currently off the floor for testing. Will try again later to see.                   Discharge Codes    No documentation.                       CASSIA Dumont

## 2024-03-10 NOTE — PLAN OF CARE
Goal Outcome Evaluation:  Plan of Care Reviewed With: patient, spouse        Progress: no change  Outcome Evaluation: PT EVAL COMPLETED. Pt PLEASANT AND AGREEABLE TO THERAPY.  NOTED FLATTENING OF L SIDE OF FACE.  Pt IS SLOW TO MOVE THE L UE W/ INCREASE EFFORT NOTING A LACK OF FULL/COMPLETE MVMT W/OUT STAFF MAKING HIM AWARE OF IT.  Pt PERFORMED BED MOBILITY TO COME TO SIT AT EDGE OF BED W/ MIN ASSIST AND VC W/ INCREASE TIME AND EFFORT.  Pt W/ NOTED POSTERIOR LEAN AND L RETRACTED SHLD.  Pt ABLE TO CORRECT BALANCE W/ VERBAL AND TACTILE CUES.  Pt W/ NOTED WORSENING POSTERIOR LEAN W/ DYNAMIC ACTIVITY REQUIRING CUES AND MIN ASSIST TO RECOVER.  Pt PERFORMS SIT TO/FROM STAND W/ CGA.  GAIT W/ CGA W/ NOTED MILD SCISSORING AT TIMES AND MILD L VEERING FROM STRAIGHT PATH.  Pt ABLE TO COMPLETE 360 DEGREE TURN W/ CONTINUOUS STEPPING AND PERFORM BACKWARD GAIT W/OUT LOSS OF BALANCE.  PERFORMED DUAL TASK OF WALKING W/ CONTINUOUS STEPS AND COUNTING BACKWARDS BY 2.  Pt TENDS TO WALK VERY CLOSELY OR RUB SHLD ON L WALKING THROUGH DOORWAYS x 4 DEMONSTRATING A POSSIBLE NEGLECT ON L SIDE.  TRACKS OBJECTS W/ EYES W/OUT DIFFICULTY AND PERIPHERAL VISION INTACT EQUALLY.  Pt INTACT TO FTN, FINGER OPPOSITION, HEEL TO SHIN AND SHAYY IN L U/LE, HOWEVER INCREASE EFFORT W/ INCREASE TIME TO COMPLETE W/OUT FULLY MVMTS IN THE L UE.  Pt WILL BENEFIT FROM CONTINUED THERAPY TO IMPROVE ON HIS DEFICITS.  RECOMMEND OP THERAPY AT DISCHARGE.  WILL FOLLOW FOR PROGRESS AND NEEDS.      Anticipated Discharge Disposition (PT): home with assist, home with outpatient therapy services

## 2024-03-10 NOTE — THERAPY EVALUATION
Patient Name: Rikki Amaya  : 1955    MRN: [id number]                              Today's Date: 3/10/2024       Admit Date: 3/10/2024    Visit Dx:     ICD-10-CM ICD-9-CM   1. Facial droop  R29.810 781.94   2. Stroke-like symptoms  R29.90 781.99   3. Atrial fibrillation, unspecified type  I48.91 427.31   4. Impaired functional mobility and activity tolerance [Z74.09]  Z74.09 V49.89     Patient Active Problem List   Diagnosis    Lichen planus    Laryngopharyngeal reflux    HTN (hypertension), benign    Barbosa's esophagus without dysplasia    Controlled type 2 diabetes mellitus without complication, without long-term current use of insulin    Hx of colonic polyps    Gastroesophageal reflux disease with esophagitis    Stroke    TIA (transient ischemic attack)     Past Medical History:   Diagnosis Date    Basal cell carcinoma     Hyperlipidemia     Hypertension     Kidney stone     Stroke 3/10/2024     Past Surgical History:   Procedure Laterality Date    COLONOSCOPY  10/30/2015    Normal exam repeat in 5 years    COLONOSCOPY N/A 2022    Procedure: COLONOSCOPY WITH ANESTHESIA;  Surgeon: Kelvin Mello MD;  Location:  [facility];  Service: Gastroenterology;  Laterality: N/A;  pre: hx polyps  post: polyps  Chito Finley MD     ENDOSCOPY  10/30/2015    Fundic glad polyp stomach    ENDOSCOPY N/A 2022    Procedure: ESOPHAGOGASTRODUODENOSCOPY WITH ANESTHESIA;  Surgeon: Kelvin Mello MD;  Location:  [facility];  Service: Gastroenterology;  Laterality: N/A;  pre: reflux  post: gastric polyps. dilation.   Chito Finley MD     GANGLION CYST EXCISION      KNEE ARTHROPLASTY      NECK SURGERY      SHOULDER SURGERY      SKIN CANCER EXCISION      UMBILICAL HERNIA REPAIR        General Information       Row Name 03/10/24 0831          Physical Therapy Time and Intention    Document Type evaluation;other (see comments)  SEE MAR  -VINCENZO     Mode of Treatment physical therapy  -JE        Row Name 03/10/24 0831          General Information    Patient Profile Reviewed yes  -VINCENZO     Prior Level of Function independent:;all household mobility;community mobility;driving;ADL's  -     Existing Precautions/Restrictions fall  -     Barriers to Rehab medically complex  -       Row Name 03/10/24 0831          Living Environment    People in Home spouse  -VINCENZO     Name(s) of People in Home SPOUSE - NANNETTE  -VINCENZO       Row Name 03/10/24 0831          Home Main Entrance    Number of Stairs, Main Entrance one  -JE       Row Name 03/10/24 0831          Stairs Within Home, Primary    Stairs, Within Home, Primary FROM DINING ROOM TO BEDROOM  -     Number of Stairs, Within Home, Primary one  -JE       Row Name 03/10/24 0831          Cognition    Orientation Status (Cognition) oriented x 4  -JE       Row Name 03/10/24 0831          Safety Issues, Functional Mobility    Safety Issues Affecting Function (Mobility) at risk behavior observed;insight into deficits/self-awareness;safety precaution awareness  -     Impairments Affecting Function (Mobility) grasp;motor control;postural/trunk control;strength;balance;coordination  -               User Key  (r) = Recorded By, (t) = Taken By, (c) = Cosigned By      Initials Name Provider Type    Nelia Arzola, PT Physical Therapist                   Mobility       Row Name 03/10/24 0831          Bed Mobility    Bed Mobility supine-sit;sit-supine;scooting/bridging  -     Scooting/Bridging [place] (Bed Mobility) standby assist;contact guard;verbal cues  -     Supine-Sit [place] (Bed Mobility) minimum assist (75% patient effort);verbal cues;nonverbal cues (demo/gesture)  -     Sit-Supine [place] (Bed Mobility) standby assist;verbal cues  -     Assistive Device (Bed Mobility) bed rails;head of bed elevated  -     Comment, (Bed Mobility) INCREASE TIME AND EFFORT TO COMPLETE  -       Row Name 03/10/24 0831          Sit-Stand Transfer    Sit-Stand  [place] (Transfers) contact guard;standby assist;verbal cues  -       Row Name 03/10/24 0831          Gait/Stairs (Locomotion)    [place] Level (Gait) contact guard;verbal cues  -     Distance in Feet (Gait) ~70 FT  -     Deviations/Abnormal Patterns (Gait) gait speed decreased  -     Comment, (Gait/Stairs) NOTED MILD SCISSORING AT TIMES W/ MILD VEERING TO L W/ GAIT  -               User Key  (r) = Recorded By, (t) = Taken By, (c) = Cosigned By      Initials Name Provider Type    Nelia Arzola, PT Physical Therapist                   Obj/Interventions       Row Name 03/10/24 0831          Range of Motion Comprehensive    Comment, General Range of Motion AROM ALL 4 EXTREMITIES WFLS, HOWEVER NOTED INCREASE EFFORT TO PERFORM L UE ROM W/ INCREASE TIME AND EFFORT  -       Row Name 03/10/24 0831          Strength Comprehensive (MMT)    Comment, General Manual Muscle Testing (MMT) Assessment DEFER UE FORMAL STRENGTH ASSESSMENT TO OT, HOWEVER MOVES L UE AGAINST GRAVITY W/OUT DIFFICULTY W/ INCREASE TIME AND EFFORT; R LE 4+ TO 5/5, L HIP FLEXOR AND HAMSTRING 4 TO 4+ ALL OTHER MS GROUPS 4+/5 TO 5/5  -       Row Name 03/10/24 0831          Motor Skills    Motor Skills --  INTACT TO FTN, FINGER OPPOSITION, HEEL TO SHIN, AND SHAYY IN  R U/LE HOWEVER NOTED DECREASE COMPLETENESS OF TASK IN L UE W/ SLOWER MVMTS AND INCREASE EFFORT  -       Row Name 03/10/24 0831          Balance    Balance Assessment sitting static balance;sitting dynamic balance;sit to stand dynamic balance;standing static balance;standing dynamic balance  -     Static Sitting Balance standby assist  INITIALLY NOTED L SHLD RETRACTED REQUIRING VCS FOR INCREASE AWARENESS AND TO CORRECT  -     Dynamic Sitting Balance minimal assist;verbal cues  POSTERIOR LEAN W/ DYNAMIC ACTIVITY  -     Position, Sitting Balance supported;unsupported;sitting edge of bed  -     Sit to Stand Dynamic Balance contact guard;verbal cues  -     Static  Standing Balance contact guard;verbal cues  -     Dynamic Standing Balance contact guard;verbal cues  -     Position/Device Used, Standing Balance unsupported  -       Row Name 03/10/24 0831          Sensory Assessment (Somatosensory)    Sensory Assessment (Somatosensory) sensation intact  -               User Key  (r) = Recorded By, (t) = Taken By, (c) = Cosigned By      Initials Name Provider Type    Nelia Arzola, PT Physical Therapist                   Goals/Plan       Row Name 03/10/24 0831          Bed Mobility Goal 1 (PT)    Activity/Assistive Device (Bed Mobility Goal 1, PT) bed mobility activities, all  -JE     [place] Level/Cues Needed (Bed Mobility Goal 1, PT) modified independence;independent  -JE     Time Frame (Bed Mobility Goal 1, PT) long term goal (LTG);10 days  -JE     Progress/Outcomes (Bed Mobility Goal 1, PT) goal ongoing  -       Row Name 03/10/24 0831          Transfer Goal 1 (PT)    Activity/Assistive Device (Transfer Goal 1, PT) sit-to-stand/stand-to-sit;bed-to-chair/chair-to-bed  -     [place] Level/Cues Needed (Transfer Goal 1, PT) standby assist;independent  -JE     Time Frame (Transfer Goal 1, PT) long term goal (LTG);10 days  -JE     Progress/Outcome (Transfer Goal 1, PT) goal ongoing  -       Row Name 03/10/24 0831          Gait Training Goal 1 (PT)    Activity/Assistive Device (Gait Training Goal 1, PT) gait (walking locomotion);decrease fall risk;diminish gait deviation;improve balance and speed;increase endurance/gait distance  -     [place] Level (Gait Training Goal 1, PT) standby assist;independent  -     Distance (Gait Training Goal 1, PT) 250+ FT  -     Strategies/Barriers (Gait Training Goal 1, PT) MAINTAINING STRAIGHT PATH W/OUT SCISSORING  -     Progress/Outcome (Gait Training Goal 1, PT) goal ongoing  -       Row Name 03/10/24 0831          Stairs Goal 1 (PT)    Activity/Assistive Device (Stairs Goal 1, PT) ascending  stairs;descending stairs;step-to-step;decrease fall risk;improve balance and speed  -     [place] Level/Cues Needed (Stairs Goal 1, PT) contact guard required  -JE     Number of Stairs (Stairs Goal 1, PT) 1-2  -JE     Time Frame (Stairs Goal 1, PT) long term goal (LTG);10 days  -JE     Progress/Outcome (Stairs Goal 1, PT) goal ongoing  -       Row Name 03/10/24 0831          Therapy Assessment/Plan (PT)    Planned Therapy Interventions (PT) balance training;bed mobility training;gait training;home exercise program;postural re-education;patient/family education;motor coordination training;stair training;strengthening;transfer training;other (see comments)  SAFETY/FALLS PREVENTION  -               User Key  (r) = Recorded By, (t) = Taken By, (c) = Cosigned By      Initials Name Provider Type    Nelia Arzola, PT Physical Therapist                   Clinical Impression       Row Name 03/10/24 0831          Pain    Pretreatment Pain Rating 0/10 - no pain  -JE     Posttreatment Pain Rating 0/10 - no pain  -       Row Name 03/10/24 0831          Plan of Care Review    Plan of Care Reviewed With patient;spouse  -     Progress no change  -     Outcome Evaluation PT EVAL COMPLETED. Pt PLEASANT AND AGREEABLE TO THERAPY.  NOTED FLATTENING OF L SIDE OF FACE.  Pt IS SLOW TO MOVE THE L UE W/ INCREASE EFFORT NOTING A LACK OF FULL/COMPLETE MVMT W/OUT STAFF MAKING HIM AWARE OF IT.  Pt PERFORMED BED MOBILITY TO COME TO SIT AT EDGE OF BED W/ MIN ASSIST AND VC W/ INCREASE TIME AND EFFORT.  Pt W/ NOTED POSTERIOR LEAN AND L RETRACTED SHLD.  Pt ABLE TO CORRECT BALANCE W/ VERBAL AND TACTILE CUES.  Pt W/ NOTED WORSENING POSTERIOR LEAN W/ DYNAMIC ACTIVITY REQUIRING CUES AND MIN ASSIST TO RECOVER.  Pt PERFORMS SIT TO/FROM STAND W/ CGA.  GAIT W/ CGA W/ NOTED MILD SCISSORING AT TIMES AND MILD L VEERING FROM STRAIGHT PATH.  Pt ABLE TO COMPLETE 360 DEGREE TURN W/ CONTINUOUS STEPPING AND PERFORM BACKWARD GAIT W/OUT LOSS OF  BALANCE.  PERFORMED DUAL TASK OF WALKING W/ CONTINUOUS STEPS AND COUNTING BACKWARDS BY 2.  Pt TENDS TO WALK VERY CLOSELY OR RUB SHLD ON L WALKING THROUGH DOORWAYS x 4 DEMONSTRATING A POSSIBLE NEGLECT ON L SIDE.  TRACKS OBJECTS W/ EYES W/OUT DIFFICULTY AND PERIPHERAL VISION INTACT EQUALLY.  Pt INTACT TO FTN, FINGER OPPOSITION, HEEL TO SHIN AND SHAYY IN L U/LE, HOWEVER INCREASE EFFORT W/ INCREASE TIME TO COMPLETE W/OUT FULLY MVMTS IN THE L UE.  Pt WILL BENEFIT FROM CONTINUED THERAPY TO IMPROVE ON HIS DEFICITS.  RECOMMEND OP THERAPY AT DISCHARGE.  WILL FOLLOW FOR PROGRESS AND NEEDS.  -       Row Name 03/10/24 0831          Therapy Assessment/Plan (PT)    Patient/Family Therapy Goals Statement (PT) RETURN TO PRIOR LEVEL OF FUNCTION  -     Rehab Potential (PT) good, to achieve stated therapy goals  -     Criteria for Skilled Interventions Met (PT) yes;meets criteria;skilled treatment is necessary  -     Therapy Frequency (PT) 2 times/day  -     Predicted Duration of Therapy Intervention (PT) UNTIL DISCHARGE OR GOALS ACHIEVED  -       Row Name 03/10/24 0831          Vital Signs    Pre Systolic BP Rehab 138  -     Pre Treatment Diastolic BP 69  -     Intra Systolic BP Rehab 139  -JE     Intra Treatment Diastolic BP 83  -     Pretreatment Heart Rate (beats/min) 69  -     Posttreatment Heart Rate (beats/min) 74  -     Pre SpO2 (%) 95  -     O2 Delivery Pre Treatment room air  -     Intra SpO2 (%) 96  -     O2 Delivery Intra Treatment room air  -     Pre Patient Position Supine  -     Intra Patient Position Sitting  -     Post Patient Position Supine  -       Row Name 03/10/24 0831          Positioning and Restraints    Pre-Treatment Position in bed  -     Post Treatment Position bed  -JE     In Bed fowlers;call light within reach;encouraged to call for assist;with SLP;side rails up x2  -               User Key  (r) = Recorded By, (t) = Taken By, (c) = Cosigned By      Initials Name  Provider Type    Nelia Arzola, PT Physical Therapist                   Outcome Measures       Row Name 03/10/24 0831 03/10/24 0641       How much help from another person do you currently need...    Turning from your back to your side while in flat bed without using bedrails? 3  - 4  -LF    Moving from lying on back to sitting on the side of a flat bed without bedrails? 3  - 4  -LF    Moving to and from a bed to a chair (including a wheelchair)? 3  - 4  -LF    Standing up from a chair using your arms (e.g., wheelchair, bedside chair)? 3  - 4  -LF    Climbing 3-5 steps with a railing? 3  - 4  -LF    To walk in hospital room? 3  - 4  -LF    AM-PAC 6 Clicks Score (PT) 18  -JE 24  -    Highest Level of Mobility Goal 6 --> Walk 10 steps or more  - 8 --> Walked 250 feet or more  -LF      Row Name 03/10/24 0632          How much help from another person do you currently need...    Turning from your back to your side while in flat bed without using bedrails? 4  -LF     Moving from lying on back to sitting on the side of a flat bed without bedrails? 4  -LF     Moving to and from a bed to a chair (including a wheelchair)? 4  -LF     Standing up from a chair using your arms (e.g., wheelchair, bedside chair)? 4  -LF     Climbing 3-5 steps with a railing? 4  -LF     To walk in hospital room? 4  -LF     AM-PAC 6 Clicks Score (PT) 24  -     Highest Level of Mobility Goal 8 --> Walked 250 feet or more  -       Row Name 03/10/24 0831          Modified Raymundo Scale    Pre-Stroke Modified [place] Scale 0 - No Symptoms at all.  -     Modified [place] Scale 4 - Moderately severe disability.  Unable to walk without assistance, and unable to attend to own bodily needs without assistance.  -       Row Name 03/10/24 0831 03/10/24 0820       Functional Assessment    Outcome Measure Options AM-PAC 6 Clicks Basic Mobility (PT);Modified [place]  - AM-PAC 6 Clicks Daily Activity (OT)  -CH              User Key  (r)  = Recorded By, (t) = Taken By, (c) = Cosigned By      Initials Name Provider Type    CH Radha Mota, OTR/L Occupational Therapist    Nelia Arzola, PT Physical Therapist    Monik Hilliard RN Registered Nurse                                   PT Recommendation and Plan  Planned Therapy Interventions (PT): balance training, bed mobility training, gait training, home exercise program, postural re-education, patient/family education, motor coordination training, stair training, strengthening, transfer training, other (see comments) (SAFETY/FALLS PREVENTION)  Plan of Care Reviewed With: patient, spouse  Progress: no change  Outcome Evaluation: PT EVAL COMPLETED. Pt PLEASANT AND AGREEABLE TO THERAPY.  NOTED FLATTENING OF L SIDE OF FACE.  Pt IS SLOW TO MOVE THE L UE W/ INCREASE EFFORT NOTING A LACK OF FULL/COMPLETE MVMT W/OUT STAFF MAKING HIM AWARE OF IT.  Pt PERFORMED BED MOBILITY TO COME TO SIT AT EDGE OF BED W/ MIN ASSIST AND VC W/ INCREASE TIME AND EFFORT.  Pt W/ NOTED POSTERIOR LEAN AND L RETRACTED SHLD.  Pt ABLE TO CORRECT BALANCE W/ VERBAL AND TACTILE CUES.  Pt W/ NOTED WORSENING POSTERIOR LEAN W/ DYNAMIC ACTIVITY REQUIRING CUES AND MIN ASSIST TO RECOVER.  Pt PERFORMS SIT TO/FROM STAND W/ CGA.  GAIT W/ CGA W/ NOTED MILD SCISSORING AT TIMES AND MILD L VEERING FROM STRAIGHT PATH.  Pt ABLE TO COMPLETE 360 DEGREE TURN W/ CONTINUOUS STEPPING AND PERFORM BACKWARD GAIT W/OUT LOSS OF BALANCE.  PERFORMED DUAL TASK OF WALKING W/ CONTINUOUS STEPS AND COUNTING BACKWARDS BY 2.  Pt TENDS TO WALK VERY CLOSELY OR RUB SHLD ON L WALKING THROUGH DOORWAYS x 4 DEMONSTRATING A POSSIBLE NEGLECT ON L SIDE.  TRACKS OBJECTS W/ EYES W/OUT DIFFICULTY AND PERIPHERAL VISION INTACT EQUALLY.  Pt INTACT TO FTN, FINGER OPPOSITION, HEEL TO SHIN AND SHAYY IN L U/LE, HOWEVER INCREASE EFFORT W/ INCREASE TIME TO COMPLETE W/OUT FULLY MVMTS IN THE L UE.  Pt WILL BENEFIT FROM CONTINUED THERAPY TO IMPROVE ON HIS DEFICITS.  RECOMMEND OP THERAPY AT  DISCHARGE.  WILL FOLLOW FOR PROGRESS AND NEEDS.     Time Calculation:         PT Charges       Row Name 03/10/24 0944             Time Calculation    Start Time 0831  -      Stop Time 0914  -      Time Calculation (min) 43 min  -      PT Received On 03/10/24  -      PT Goal Re-Cert Due Date 03/20/24  -                User Key  (r) = Recorded By, (t) = Taken By, (c) = Cosigned By      Initials Name Provider Type    Nelia Arzola, PT Physical Therapist                  Therapy Charges for Today       Code Description Service Date Service Provider Modifiers Qty    [id number] HC PT EVAL MOD COMPLEXITY 3 3/10/2024 Nelia Rios, PT GP 1            PT G-Codes  Outcome Measure Options: AM-PAC 6 Clicks Basic Mobility (PT), Modified [place]  AM-PAC 6 Clicks Score (PT): 18  Modified Ray[place] Scale: 4 - Moderately severe disability.  Unable to walk without assistance, and unable to attend to own bodily needs without assistance.  PT Discharge Summary  Anticipated Discharge Disposition (PT): home with assist, home with outpatient therapy services    Nelia Rios, PT  3/10/2024

## 2024-03-10 NOTE — ED NOTES
Nursing report ED to floor  Rikki Amaya  68 y.o.  male    HPI:   Chief Complaint   Patient presents with    Neuro Deficit(s)       Admitting doctor:   Stoney Chavez MD    Consulting provider(s):  Consults       Date and Time Order Name Status Description    3/10/2024  5:49 AM Inpatient Neurology Consult Stroke               Admitting diagnosis:   The primary encounter diagnosis was Facial droop. Diagnoses of Stroke-like symptoms and Atrial fibrillation, unspecified type were also pertinent to this visit.    Code status:   Current Code Status       Date Active Code Status Order ID Comments User Context       Not on file            Allergies:   Patient has no known allergies.    Intake and Output  No intake or output data in the 24 hours ending 03/10/24 0602    Weight:       03/10/24  0413   Weight: 107 kg (236 lb)       Most recent vitals:   Vitals:    03/10/24 0415 03/10/24 0419 03/10/24 0500 03/10/24 0501   BP:  (!) 132/103  145/95   Pulse:  74  64   Resp:       Temp: 97.6 °F (36.4 °C)      SpO2:  94% 92%    Weight:       Height:         Oxygen Therapy: .    Active LDAs/IV Access:   Lines, Drains & Airways       Active LDAs       Name Placement date Placement time Site Days    Peripheral IV 03/10/24 0416 Left Antecubital 03/10/24  0416  Antecubital  less than 1    Peripheral IV 03/10/24 0416 Right Forearm 03/10/24  0416  Forearm  less than 1                    Labs (abnormal labs have a star):   Labs Reviewed   BASIC METABOLIC PANEL - Abnormal; Notable for the following components:       Result Value    Glucose 286 (*)     All other components within normal limits    Narrative:     GFR Normal >60  Chronic Kidney Disease <60  Kidney Failure <15     BNP (IN-HOUSE) - Abnormal; Notable for the following components:    proBNP 1,665.0 (*)     All other components within normal limits    Narrative:     This assay is used as an aid in the diagnosis of individuals suspected of having heart failure. It can be  used as an aid in the diagnosis of acute decompensated heart failure (ADHF) in patients presenting with signs and symptoms of ADHF to the emergency department (ED). In addition, NT-proBNP of <300 pg/mL indicates ADHF is not likely.    Age Range Result Interpretation  NT-proBNP Concentration (pg/mL:      <50             Positive            >450                   Gray                 300-450                    Negative             <300    50-75           Positive            >900                  Gray                300-900                  Negative            <300      >75             Positive            >1800                  Gray                300-1800                  Negative            <300   CBC WITH AUTO DIFFERENTIAL - Abnormal; Notable for the following components:    MCHC 36.0 (*)     RDW 11.6 (*)     RDW-SD 36.4 (*)     All other components within normal limits   POCT GLUCOSE FINGERSTICK - Abnormal; Notable for the following components:    Glucose 257 (*)     All other components within normal limits   MAGNESIUM - Normal   SINGLE HSTROPONIN T - Normal    Narrative:     High Sensitive Troponin T Reference Range:  <14.0 ng/L- Negative Female for AMI  <22.0 ng/L- Negative Male for AMI  >=14 - Abnormal Female indicating possible myocardial injury.  >=22 - Abnormal Male indicating possible myocardial injury.   Clinicians would have to utilize clinical acumen, EKG, Troponin, and serial changes to determine if it is an Acute Myocardial Infarction or myocardial injury due to an underlying chronic condition.        RAINBOW DRAW    Narrative:     The following orders were created for panel order [place] Draw.  Procedure                               Abnormality         Status                     ---------                               -----------         ------                     Green Top (Gel)[[id number]]                                  Final result               Lavender Top[[id number]]                                      Final result               Red Top[[id number]]                                          Final result               Light Blue Top[[id number]]                                   Final result                 Please view results for these tests on the individual orders.   GREEN TOP   LAVENDER TOP   RED TOP   LIGHT BLUE TOP   CBC AND DIFFERENTIAL    Narrative:     The following orders were created for panel order CBC & Differential.  Procedure                               Abnormality         Status                     ---------                               -----------         ------                     CBC Auto Differential[[id number]]        Abnormal            Final result                 Please view results for these tests on the individual orders.       Meds given in ED:   Medications - No data to display  No current facility-administered medications for this encounter.       NIH Stroke Scale:  Interval: baseline    Isolation/Infection(s):  No active isolations   No active infections     COVID Testing  Collected .  Resulted .    Nursing report ED to floor:  Mental status: .A&O x4  Ambulatory status: .standby  Precautions: .fall    ED nurse phone extentsion- .. [phone]

## 2024-03-10 NOTE — PLAN OF CARE
Goal Outcome Evaluation:  Plan of Care Reviewed With: patient        Progress: no change  Outcome Evaluation: OT eval completed.  Pt. is AxO x 4 & pleasant.  His spouse is present and he has no complaints.  Noteable L sided facial droop.  Mr. Amaya able to follow all commands and converse freely with no noted aphasia.  He demo's a mild weakness in L UE but reports normal sensation.  During AROM testing he achieved WFL; however delay with AROM at LUE. Increased time and cueing needed for pt to be able to achieve full range. Similar during coordination testing - pt able to perform required movements but performance is delayed.  Delay could be attributed to decreased strength, decreased motor planning, & L neglect. Pt able to track in all 4 quadrants, does not appear to have visual field cut but demo's L neglect. While seated EOB L shoulder retracted & required tactile cues to correct to acheive neutral upright posture. During dynamic sitting pt had multiple posterior LOBs and required assist to correct. Pt able to DOFF socks at CGA and demo'd L neglect requiring cues to utilize L UE for ADL perf.  During fxl mob pt. needed CGA and demo'd decreased L arm swing.  He grazed or ran into door frames and medical equipment 4x on L side with no self awareness or ability to compensate.  Mr. Amaya would benefit from cont'd OT tx to improve his indep in self care & fxl mob.  He will require cont'd therapies at [facility].      Anticipated Discharge Disposition (OT):  (cont'd OT tx)

## 2024-03-10 NOTE — PLAN OF CARE
Goal Outcome Evaluation:  Plan of Care Reviewed With: patient, spouse        Progress: no change  Outcome Evaluation: Received from ED to room 335 with lt facial drooping.  A&Ox4. No c/o pain. NIH 1, lt facial droop. No n/t. SCD. RA. Tele placed, Aflutter, HR 70's. . Neuro consult. Awaiting orders. Wife at bs.

## 2024-03-10 NOTE — PLAN OF CARE
Goal Outcome Evaluation:           Progress: no change  Outcome Evaluation: Pt A&OX4. VSS. Tele Afib. No C/O pain reported. PPP. NIH 2 W/ LUE drift. Pt up x 1assist. Diet regular cardiac/ diabetic diet W/ nectar thick liquids. SCDs in use. Call light withing reach, safety maintaned. Family @ bedside.

## 2024-03-10 NOTE — Clinical Note
Level of Care: Telemetry [5]   Diagnosis: Stroke [[id number]]   Admitting Physician: LUIS FRITZ [[id number]]   Attending Physician: LUIS FRITZ [[id number]]   Certification: I Certify That Inpatient Hospital Services Are Medically Necessary For Greater Than 2 Midnights

## 2024-03-10 NOTE — PROGRESS NOTES
[facility] Medicine Services  INPATIENT PROGRESS NOTE    Patient Name: Rikki Amaya  Date of Admission: 3/10/2024  Today's Date: 03/10/24  Length of Stay: 0  Primary Care Physician: Chito Finley MD    Subjective   Chief Complaint: Facial droop, left upper extremity weakness  HPI   Patient admitted last night with left facial droop and speech difficulty, noted to have left upper extremity weakness.  He was found to be on atrial fibrillation and this is a new diagnosis for him.  He was evaluated earlier today by night physician.  Per family, symptoms started approximately 3 AM today.  Patient reports history of hypertension and hyperlipidemia, but no diagnosed diabetes.  His hemoglobin A1c was found to be above 8 today.  Electrocardiogram showed atrial fibrillation, controlled rate, with no palpitations reported.  No chest pain.      Review of Systems   All pertinent negatives and positives are as above. All other systems have been reviewed and are negative unless otherwise stated.     Objective    Temp:  [97.6 °F (36.4 °C)-98.6 °F (37 °C)] 98.6 °F (37 °C)  Heart Rate:  [64-82] 76  Resp:  [15-20] 20  BP: (132-158)/() 145/95  Physical Exam  Constitutional:       Appearance: Normal appearance.  Found lying in bed.  Family members present.  HENT:      Head: Normocephalic and atraumatic.      Nose: Nose normal.      Mouth/Throat:      Mouth: Mucous membranes are moist.      Pharynx: Oropharynx is clear.   Eyes:      Extraocular Movements: Extraocular movements intact.      Conjunctiva/sclera: Conjunctivae normal.      Pupils: Pupils are equal, round, and reactive to light.   Cardiovascular:      Rate and Rhythm: irregular rhythm.      Pulses: Normal pulses.   Pulmonary:      Effort: No respiratory distress.      Breath sounds: Normal breath sounds. No wheezing, rhonchi or rales.   Abdominal:      General: Abdomen is obese.  Bowel sounds are normal.      Palpations:  "Abdomen is soft.      Tenderness: There is no guarding or rebound.   Musculoskeletal:         General: Normal range of motion.      Cervical back: Normal range of motion and neck supple.   Extremities:  No lower extremity edema.  Skin:     Capillary Refill: Capillary refill takes less than 2 seconds.      Coloration: Skin is not jaundiced.      Findings: No rash.   Neurological:      There is weakness of the left upper extremity 4 out of 5; strength of the right upper and bilateral lower extremities is 5 out of 5.  Gait was not evaluated.  Memory slightly impaired.  Alert oriented to place time and person.  Left facial weakness.    Psychiatric:         Mood and Affect: Mood normal.         Behavior: Behavior normal.           Results Review:  I have reviewed the labs, radiology results, and diagnostic studies.    Laboratory Data:   Results from last 7 days   Lab Units 03/10/24  0417   WBC 10*3/mm3 6.54   HEMOGLOBIN g/dL 15.5   HEMATOCRIT % 43.1   PLATELETS 10*3/mm3 160        Results from last 7 days   Lab Units 03/10/24  0417   SODIUM mmol/L 137   POTASSIUM mmol/L 4.4   CHLORIDE mmol/L 101   CO2 mmol/L 24.0   BUN mg/dL 16   CREATININE mg/dL 0.93   CALCIUM mg/dL 9.2   GLUCOSE mg/dL 286*       Culture Data:   No results found for: \"BLOODCX\", \"URINECX\", \"WOUNDCX\", \"MRSACX\", \"RESPCX\", \"STOOLCX\"    Radiology Data:   Imaging Results (Last 24 Hours)       Procedure Component Value Units Date/Time    CT Head Without Contrast [[id number]] Collected: 03/10/24 0758     Updated: 03/10/24 0803    Narrative:      EXAMINATION:  CT HEAD WO CONTRAST-  3/10/2024 4:05 AM     HISTORY: Stroke work-up. Left-sided facial drooping. Slurred speech.     TECHNIQUE: Multiple axial images were obtained through the brain without  contrast infusion. Multiplanar images were reconstructed.     DLP: 727.98 mGy.cm Automated dosage reduction technique was utilized to  reduce patient dosage.     COMPARISON: No comparison study.     FINDINGS: There are " no hemorrhage, edema or mass effect. There is  minimal atrophy and mild prominence of the lateral ventricles. The  visualized paranasal sinuses are clear. The mastoid air cells are clear.  No calvarial fracture is seen.          Impression:      1. No hemorrhage, edema or mass effect.  2. Minimal atrophy with associated prominence of the lateral ventricles.        This report was signed and finalized on 3/10/2024 8:00 AM by Dr. Joey Lisa MD.               I have reviewed the patient's current medications.     Assessment/Plan   Assessment  Active Hospital Problems    Diagnosis     **Stroke     HTN (hypertension), benign     Gastroesophageal reflux disease with esophagitis     Controlled type 2 diabetes mellitus without complication, without long-term current use of insulin        Acute ischemic cerebrovascular accident highly suspected  Newly diagnosed atrial fibrillation  Hyperlipidemia  Hypertension  Obesity, BMI 35  Diabetes mellitus type 2, new diagnosis      Treatment Plan  MRI of the brain without contrast pending.  CT angiogram head pending.  Once bleeding has been ruled out, will start patient on full anticoagulation.  Neurology evaluation  Telemetry monitoring.    Start basal insulin, 10 units at bedtime.  Follow glucose control.  Given recent contrast administration, will hold metformin, but may restart on discharge.    Blood pressure management.  May restart amlodipine tomorrow.    Continue atorvastatin, 80 mg daily.  Continue Toprol- mg p.o. daily    Follow echocardiogram.    Secondary prevention.      Patient and family members informed of plan.  Follow physical therapy, occupational therapy and speech therapy recommendations.  Consider acute rehabilitation.    Medical Decision Making  Number and Complexity of problems: 6, moderate to high complexity  Differential Diagnosis: See above    Conditions and Status        Condition is unchanged.     MDM Data  External documents reviewed:  None  Cardiac tracing (EKG, telemetry) interpretation: Atrial fibrillation, controlled rate  Radiology interpretation: Radiology reports reviewed  Labs reviewed: Yes  Any tests that were considered but not ordered: No     Decision rules/scores evaluated (example [id number], Wells, etc): See chart     Discussed with: Patient and family     Care Planning  Shared decision making: With patient  Code status and discussions: Full code    Disposition  Social Determinants of Health that impact treatment or disposition: Acute ischemic stroke.    Electronically signed by Nino Rousseau MD, 03/10/24, 11:14 CDT.

## 2024-03-10 NOTE — CONSULTS
Neurology Consult Note    Consult Date: 3/10/2024  Referring MD: No ref. provider found      Patient: Rikki Amaya (68 y.o. male)  MRN: [id number]  : 1955    History of Present Illness:   Rikki Amaya is a 68 y.o. male with a h/o HTN, HLD, and hypothyroidism who was admitted with L droop and L sided weakness.  No thrombolytics due to time.  EKG shows new afib.  He denies HA, n/v, CP, SOB or vertigo.      Medical History:   Past Medical/Surgical Hx:  Past Medical History:   Diagnosis Date    Basal cell carcinoma     Hyperlipidemia     Hypertension     Kidney stone     Stroke 3/10/2024     Past Surgical History:   Procedure Laterality Date    COLONOSCOPY  10/30/2015    Normal exam repeat in 5 years    COLONOSCOPY N/A 2022    Procedure: COLONOSCOPY WITH ANESTHESIA;  Surgeon: Kelvin Mello MD;  Location:  [facility];  Service: Gastroenterology;  Laterality: N/A;  pre: hx polyps  post: polyps  Chito Finley MD     ENDOSCOPY  10/30/2015    Fundic glad polyp stomach    ENDOSCOPY N/A 2022    Procedure: ESOPHAGOGASTRODUODENOSCOPY WITH ANESTHESIA;  Surgeon: Kelvin Mello MD;  Location:  [facility];  Service: Gastroenterology;  Laterality: N/A;  pre: reflux  post: gastric polyps. dilation.   Chito Finley MD     GANGLION CYST EXCISION      KNEE ARTHROPLASTY      NECK SURGERY      SHOULDER SURGERY      SKIN CANCER EXCISION      UMBILICAL HERNIA REPAIR         Medications On Admission:  Medications Prior to Admission   Medication Sig Dispense Refill Last Dose    amLODIPine (NORVASC) 10 MG tablet Take 1 tablet by mouth Daily.   3/9/2024    lisinopril 10 MG tablet 20 mg, hydrochlorothiazide 25 MG tablet 12.5 mg Take  by mouth Daily. No longer taking   Patient Taking Differently    metFORMIN (GLUCOPHAGE) 500 MG tablet Take 1 tablet by mouth 2 (Two) Times a Day With Meals.   3/9/2024    metoprolol succinate XL (TOPROL-XL) 100 MG 24 hr tablet Take 1 tablet by mouth Daily.    3/9/2024    oxymetazoline (AFRIN) 0.05 % nasal spray 2 sprays into the nostril(s) as directed by provider 2 (Two) Times a Day.   3/9/2024    pantoprazole (PROTONIX) 40 MG EC tablet Take 1 tablet by mouth Daily.   3/9/2024    simvastatin (ZOCOR) 10 MG tablet Take 1 tablet by mouth Every Night.   Patient Taking Differently    betamethasone, augmented, (DIPROLENE) 0.05 % lotion Apply  topically Daily. As needed 15 mL 1 Unknown    Ibuprofen-Diphenhydramine Cit (ADVIL PM PO) Take  by mouth.   Unknown    loratadine (CLARITIN) 10 MG tablet Take 1 tablet by mouth Daily.   Unknown       Current Medications:    Current Facility-Administered Medications:     acetaminophen (TYLENOL) tablet 650 mg, 650 mg, Oral, Q4H PRN **OR** acetaminophen (TYLENOL) suppository 650 mg, 650 mg, Rectal, Q4H PRN, Stoney Chavez MD    aspirin chewable tablet 81 mg, 81 mg, Oral, Daily **OR** aspirin suppository 300 mg, 300 mg, Rectal, Daily, Stoney Chavez MD    atorvastatin (LIPITOR) tablet 80 mg, 80 mg, Oral, Nightly, Stoney Chavez MD    dextrose (D50W) (25 g/50 mL) IV injection 25 g, 25 g, Intravenous, Q15 Min PRN, Stoney Chavez MD    dextrose (GLUTOSE) oral gel 15 g, 15 g, Oral, Q15 Min PRN, Stoney Chavez MD    glucagon (GLUCAGEN) injection 1 mg, 1 mg, Intramuscular, Q15 Min PRN, Stoney Chavez MD    insulin regular (humuLIN R,novoLIN R) injection 2-7 Units, 2-7 Units, Subcutaneous, TID Soham HERMAN Carlos F, MD    labetalol (NORMODYNE,TRANDATE) injection 10 mg, 10 mg, Intravenous, Q4H PRN, Stoney Chavez MD    metoprolol succinate XL (TOPROL-XL) 24 hr tablet 100 mg, 100 mg, Oral, Daily, Stoney Chavez MD    pantoprazole (PROTONIX) EC tablet 40 mg, 40 mg, Oral, Daily, Stoney Chavez MD    sodium chloride 0.9 % flush 10 mL, 10 mL, Intravenous, Q12H, Stoney Chavez MD    sodium chloride 0.9 % flush 10 mL, 10 mL, Intravenous, PRN, Scott,  "Stoney Waters MD    sodium chloride 0.9 % infusion 40 mL, 40 mL, Intravenous, PRN, Stoney Chavez MD     Allergies:  No Known Allergies    Social Hx:  Social History     Socioeconomic History    Marital status:    Tobacco Use    Smoking status: Never    Smokeless tobacco: Former     Types: Chew   Vaping Use    Vaping status: Never Used   Substance and Sexual Activity    Alcohol use: No    Drug use: No    Sexual activity: Defer       Family Hx:  Family History   Problem Relation Age of Onset    Hypertension Mother     Melanoma Father     Colon cancer Neg Hx      Physical Examination:   Vital Signs:  Vitals:    03/10/24 0500 03/10/24 0501 03/10/24 0602 03/10/24 0634   BP:  145/95 135/91 145/95   BP Location:    Right arm   Patient Position:    Lying   Pulse:  64 69 76   Resp:    20   Temp:    98.6 °F (37 °C)   TempSrc:    Oral   SpO2: 92%  93% 97%   Weight:    109 kg (240 lb)   Height:    175.3 cm (69\")       General Exam:  Head:  Normocephalic, atraumatic  HEENT:  Neck supple  Fundoscopic Exam:  No signs of disc edema  CVS:  Regular rate and rhythm.  No murmurs  Carotid Examination:  No bruits  Lungs:  Clear to auscultation  Abdomen:  Nontender, Nondistended  Extremities:  No signs of peripheral edema  Skin:  No rashes    Neurologic Exam:    Mental Status:    -Awake, Alert, Oriented X 3  -No word finding difficulties  -No aphasia  -No dysarthria  -Follows simple and complex commands    CN II:  Visual fields full.  Pupils equally reactive to light  CN III, IV, VI:  Extraocular Muscles full with no signs of nystagmus  CN V:  Facial sensory is symmetric with no asymmetries.  CN VII: Left nasolabial flattening  CN VIII:  Gross hearing intact bilaterally  CN IX:  Palate elevates symmetrically  CN X:  Palate elevates symmetrically  CN XI:  Shoulder shrug symmetric  CN XII:  Tongue is midline on protrusion    Motor: (strength out of 5:  1= minimal movement, 2 = movement in plane of gravity, 3 = movement " "against gravity, 4 = movement against some resistance, 5 = full strength)    -Right Upper Ext: Proximal: 5 Distal: 5  -Left Upper drift and strength 4-4+, left lower extremity 4+ to 5 -    -Right Lower Ext: Proximal: 5 Distal: 5      DTR:  -Right   Biceps: 2+ Triceps: 2+ Brachioradialis: 2+   Patella: 2+ Ankle: 2+ Neg Babinski  -Left   Biceps: 2+ Triceps: 2+ Brachioradialis: 2+   Patella: 2+ Ankle: 2+ Neg Babinski    Sensory:  -Intact to light touch, pinprick, temperature, pain, and proprioception    Coordination:  -Finger to nose intact  -Heel to shin intact  -No ataxia    Gait  -No signs of ataxia  -ambulates unassisted    Recent Diagnostics:   Laboratory Results:   - Reviewed in EMR  Lab Results   Component Value Date    GLUCOSE 286 (H) 03/10/2024    CALCIUM 9.2 03/10/2024     03/10/2024    K 4.4 03/10/2024    CO2 24.0 03/10/2024     03/10/2024    BUN 16 03/10/2024    CREATININE 0.93 03/10/2024    BCR 17.2 03/10/2024    ANIONGAP 12.0 03/10/2024     Lab Results   Component Value Date    WBC 6.54 03/10/2024    HGB 15.5 03/10/2024    HCT 43.1 03/10/2024    MCV 86.5 03/10/2024     03/10/2024     No results found for: \"PTT\", \"INR\"  Lab Results   Component Value Date    TRIG 140 07/13/2015    HDL 25 07/13/2015     (H) 07/13/2015     No results found for: \"HGBA1C\"    Imaging Results:  Imaging Results (Last 24 Hours)       Procedure Component Value Units Date/Time    CT Head Without Contrast [[id number]] Collected: 03/10/24 0758     Updated: 03/10/24 0803    Narrative:      EXAMINATION:  CT HEAD WO CONTRAST-  3/10/2024 4:05 AM     HISTORY: Stroke work-up. Left-sided facial drooping. Slurred speech.     TECHNIQUE: Multiple axial images were obtained through the brain without  contrast infusion. Multiplanar images were reconstructed.     DLP: 727.98 mGy.cm Automated dosage reduction technique was utilized to  reduce patient dosage.     COMPARISON: No comparison study.     FINDINGS: There are no " hemorrhage, edema or mass effect. There is  minimal atrophy and mild prominence of the lateral ventricles. The  visualized paranasal sinuses are clear. The mastoid air cells are clear.  No calvarial fracture is seen.          Impression:      1. No hemorrhage, edema or mass effect.  2. Minimal atrophy with associated prominence of the lateral ventricles.        This report was signed and finalized on 3/10/2024 8:00 AM by Dr. Joey Lisa MD.                    Assessment & Plan:   Patient is a 68 y M with HTN, HLD, and hypothyroidism with new afib and L sided weakness.  No TPA due to time.  MRI brain shows an acute right frontal stroke. OK to initiate OAC.  Maintain euglycemia and normothermia.  Okay to start normalizing systolic blood pressure to a goal of less than 140.  Ultimately needs to be less than 130 systolic    TTE with bubble pending.  LDL, A1C, Utox.  PT/OT/SLP.  AC statin therapy.  Will follow-up.    Thang Moreira MD  03/10/24  09:11 CDT

## 2024-03-10 NOTE — PLAN OF CARE
Goal Outcome Evaluation:  Plan of Care Reviewed With: patient, spouse        Progress: improving     SPEECH-LANGUAGE PATHOLOGY EVALUATION - SWALLOW  Subjective: The patient was seen on this date for a Clinical Swallow evaluation.  Patient was alert and cooperative.  Significant history: admit with CVA symptoms including left facial drooping.  Passed swallow screening even though droop present, therefore completed evaluation.   Objective: Oral motor examination results: left facial asymmetry .  Textures given during assessment of swallow function included thin liquid, nectar thick liquid, honey thick liquid, puree consistency, and regular consistency.  Assessment: Difficulties were noted with thin liquid and nectar thick liquid.  Observations: Overt strong coughing following thin liquids via straw.  Mild cough and throat clearing with nectar x1 but multiple other trials did not show overt s/s of aspiration.  Good mastication of solids.  No pocketing observed though may be some risk due to droop.  Sensation appears intact.  SLP Findings:  Patient presents with mild to moderate oropharyngeal dysphagia, with esophageal component.   Recommendations: Diet Textures: regular and nectar thick liquids  Medications should be taken whole with puree. May have sips of water between meals and/or after oral care, under staff or family supervision and with the recommended strategies for safe swallowing.   Recommended Strategies: upright for PO, small bites and sips, lingual sweep left, and place bolus on right. Oral care after meals.  Other Recommended Evaluations:  n/a     Dysphagia therapy is recommended.      Anticipated Discharge Disposition (SLP): unknown          SLP Swallowing Diagnosis: mild-moderate, oral dysphagia, pharyngeal dysphagia (03/10/24 0900)

## 2024-03-10 NOTE — H&P
[facility]  HISTORY AND PHYSICAL    Date of Admission: 3/10/2024  Primary Care Physician: Chito Finley MD    Subjective   Primary Historian: Patient    Chief Complaint: Left facial droop, speech difficulty    History of Present Illness  68 year old male with PMH of HTN, dyslipidemia, hypothyroidism, kidney stone, that presents to the ER with complaints of waking up with left facial droop, difficulty with speech, left arm weakness and difficulty getting up from the bed as in having no torso strength. At the time of exam his residual deficits are left facial droop and slight speech heaviness. Interestingly EKG is consistent with rate controlled Afib.     Review of Systems   Otherwise complete ROS reviewed and negative except as mentioned in the HPI.    Past Medical History:   Past Medical History:   Diagnosis Date    Basal cell carcinoma     Hyperlipidemia     Hypertension     Kidney stone     Stroke 3/10/2024     Past Surgical History:  Past Surgical History:   Procedure Laterality Date    COLONOSCOPY  10/30/2015    Normal exam repeat in 5 years    COLONOSCOPY N/A 11/22/2022    Procedure: COLONOSCOPY WITH ANESTHESIA;  Surgeon: Kelvin Mello MD;  Location: [facility] ENDOSCOPY;  Service: Gastroenterology;  Laterality: N/A;  pre: hx polyps  post: polyps  Chito Finley MD     ENDOSCOPY  10/30/2015    Fundic glad polyp stomach    ENDOSCOPY N/A 11/22/2022    Procedure: ESOPHAGOGASTRODUODENOSCOPY WITH ANESTHESIA;  Surgeon: Kelvin Mello MD;  Location: [facility] ENDOSCOPY;  Service: Gastroenterology;  Laterality: N/A;  pre: reflux  post: gastric polyps. dilation.   Chito Finley MD     GANGLION CYST EXCISION      KNEE ARTHROPLASTY      NECK SURGERY      SHOULDER SURGERY      SKIN CANCER EXCISION      UMBILICAL HERNIA REPAIR       Social History:  reports that he has never smoked. He has quit using smokeless tobacco.  His smokeless tobacco use  "included chew. He reports that he does not drink alcohol and does not use drugs.    Family History: family history includes Hypertension in his mother; Melanoma in his father.       Allergies:  No Known Allergies    Medications:  Prior to Admission medications    Medication Sig Start Date End Date Taking? Authorizing Provider   amLODIPine (NORVASC) 10 MG tablet Take 1 tablet by mouth Daily.   Yes Abhi, Nurse Juan David RN   lisinopril 10 MG tablet 20 mg, hydrochlorothiazide 25 MG tablet 12.5 mg Take  by mouth Daily. No longer taking   Yes Abhi, Nurse Juan David RN   metFORMIN (GLUCOPHAGE) 500 MG tablet Take 1 tablet by mouth 2 (Two) Times a Day With Meals.   Yes Kameron Rios MD   metoprolol succinate XL (TOPROL-XL) 100 MG 24 hr tablet Take 1 tablet by mouth Daily.   Yes Abhi, Nurse Juan David RN   oxymetazoline (AFRIN) 0.05 % nasal spray 2 sprays into the nostril(s) as directed by provider 2 (Two) Times a Day.   Yes Kameron Rios MD   pantoprazole (PROTONIX) 40 MG EC tablet Take 1 tablet by mouth Daily.   Yes Emergency, Nurse Juan David RN   simvastatin (ZOCOR) 10 MG tablet Take 1 tablet by mouth Every Night.   Yes Nurse Juan David Moe RN   betamethasone, augmented, (DIPROLENE) 0.05 % lotion Apply  topically Daily. As needed 2/15/18   Garry Hernandez MD   Ibuprofen-Diphenhydramine Cit (ADVIL PM PO) Take  by mouth.    ProviderKameron MD   loratadine (CLARITIN) 10 MG tablet Take 1 tablet by mouth Daily.    ProviderKameron MD     I have utilized all available immediate resources to obtain, update, or review the patient's current medications (including all prescriptions, over-the-counter products, herbals, cannabis/cannabidiol products, and vitamin/mineral/dietary (nutritional) supplements).    Objective     Vital Signs: /95 (BP Location: Right arm, Patient Position: Lying)   Pulse 76   Temp 98.6 °F (37 °C) (Oral)   Resp 20   Ht 175.3 cm (69\")   Wt 109 kg (240 lb)   SpO2 97%   " BMI 35.44 kg/m²   Physical Exam  Constitutional:       Appearance: He is well-developed. He is not ill-appearing, toxic-appearing or diaphoretic.   HENT:      Head: Normocephalic and atraumatic.      Right Ear: External ear normal.      Left Ear: External ear normal.      Nose: Nose normal.      Mouth/Throat:      Mouth: Mucous membranes are dry.   Eyes:      General:         Right eye: No discharge.         Left eye: No discharge.      Extraocular Movements: Extraocular movements intact.      Conjunctiva/sclera: Conjunctivae normal.      Pupils: Pupils are equal, round, and reactive to light.   Neck:      Vascular: No JVD.   Cardiovascular:      Rate and Rhythm: Regular rhythm. Tachycardia present.      Heart sounds: Normal heart sounds. No murmur heard.  Pulmonary:      Effort: Pulmonary effort is normal. No respiratory distress.      Breath sounds: Normal breath sounds. No wheezing or rales.   Chest:      Chest wall: No tenderness.   Abdominal:      General: Bowel sounds are normal. There is no distension.      Palpations: Abdomen is soft.      Tenderness: There is no abdominal tenderness. There is no guarding or rebound.   Musculoskeletal:         General: No tenderness or deformity. Normal range of motion.      Cervical back: Normal range of motion and neck supple. No rigidity.      Right lower leg: No edema.      Left lower leg: No edema.   Skin:     General: Skin is warm and dry.      Findings: No rash.   Neurological:      Mental Status: He is alert and oriented to person, place, and time.      Cranial Nerves: Cranial nerve deficit present.      Sensory: No sensory deficit.      Motor: No abnormal muscle tone.      Deep Tendon Reflexes: Reflexes normal.      Comments: Left whole face droop   Psychiatric:         Mood and Affect: Mood normal.         Behavior: Behavior normal.     Results Reviewed:  Lab Results (last 24 hours)       Procedure Component Value Units Date/Time    POC Glucose Once [[id number]]   (Abnormal) Collected: 03/10/24 0630    Specimen: Blood Updated: 03/10/24 0641     Glucose 216 mg/dL      Comment: : [id number] Cedric WilsoniMeter ID: [id number]       [place] [facility] [[id number]] Collected: 03/10/24 0417    Specimen: Blood Updated: 03/10/24 0530    Narrative:      The following orders were created for panel order [place] [facility].  Procedure                               Abnormality         Status                     ---------                               -----------         ------                     Green Top (Gel)[[id number]]                                  Final result               Lavender Top[[id number]]                                     Final result               Red Top[[id number]]                                          Final result               Light Blue Top[[id number]]                                   Final result                 Please view results for these tests on the individual orders.    Green Top (Gel) [[id number]] Collected: 03/10/24 0417    Specimen: Blood Updated: 03/10/24 0530     Extra Tube Hold for add-ons.     Comment: Auto resulted.       Lavender Top [[id number]] Collected: 03/10/24 0417    Specimen: Blood Updated: 03/10/24 0530     Extra Tube hold for add-on     Comment: Auto resulted       Red Top [[id number]] Collected: 03/10/24 0417    Specimen: Blood Updated: 03/10/24 0530     Extra Tube Hold for add-ons.     Comment: Auto resulted.       Light Blue Top [[id number]] Collected: 03/10/24 0417    Specimen: Blood Updated: 03/10/24 0530     Extra Tube Hold for add-ons.     Comment: Auto resulted       Basic Metabolic Panel [[id number]]  (Abnormal) Collected: 03/10/24 0417    Specimen: Blood Updated: 03/10/24 0451     Glucose 286 mg/dL      BUN 16 mg/dL      Creatinine 0.93 mg/dL      Sodium 137 mmol/L      Potassium 4.4 mmol/L      Comment: Specimen hemolyzed.  Result may be falsely elevated.        Chloride 101 mmol/L      CO2 24.0 mmol/L      Calcium 9.2 mg/dL       BUN/Creatinine Ratio 17.2     Anion Gap 12.0 mmol/L      eGFR 88.9 mL/min/1.73     Narrative:      GFR Normal >60  Chronic Kidney Disease <60  Kidney Failure <15      Magnesium [[id number]]  (Normal) Collected: 03/10/24 0417    Specimen: Blood Updated: 03/10/24 0451     Magnesium 2.0 mg/dL     Single High Sensitivity Troponin T [[id number]]  (Normal) Collected: 03/10/24 0417    Specimen: Blood Updated: 03/10/24 0451     HS Troponin T 10 ng/L      Comment: Specimen hemolyzed.  Results may be falsely decreased.       Narrative:      High Sensitive Troponin T Reference Range:  <14.0 ng/L- Negative Female for AMI  <22.0 ng/L- Negative Male for AMI  >=14 - Abnormal Female indicating possible myocardial injury.  >=22 - Abnormal Male indicating possible myocardial injury.   Clinicians would have to utilize clinical acumen, EKG, Troponin, and serial changes to determine if it is an Acute Myocardial Infarction or myocardial injury due to an underlying chronic condition.         BNP [[id number]]  (Abnormal) Collected: 03/10/24 0417    Specimen: Blood Updated: 03/10/24 0447     proBNP 1,665.0 pg/mL     Narrative:      This assay is used as an aid in the diagnosis of individuals suspected of having heart failure. It can be used as an aid in the diagnosis of acute decompensated heart failure (ADHF) in patients presenting with signs and symptoms of ADHF to the emergency department (ED). In addition, NT-proBNP of <300 pg/mL indicates ADHF is not likely.    Age Range Result Interpretation  NT-proBNP Concentration (pg/mL:      <50             Positive            >450                   Gray                 300-450                    Negative             <300    50-75           Positive            >900                  Gray                300-900                  Negative            <300      >75             Positive            >1800                  Gray                300-1800                  Negative            <300    CBC &  Differential [[id number]]  (Abnormal) Collected: 03/10/24 0417    Specimen: Blood Updated: 03/10/24 0432    Narrative:      The following orders were created for panel order CBC & Differential.  Procedure                               Abnormality         Status                     ---------                               -----------         ------                     CBC Auto Differential[[id number]]        Abnormal            Final result                 Please view results for these tests on the individual orders.    CBC Auto Differential [[id number]]  (Abnormal) Collected: 03/10/24 0417    Specimen: Blood Updated: 03/10/24 0432     WBC 6.54 10*3/mm3      RBC 4.98 10*6/mm3      Hemoglobin 15.5 g/dL      Hematocrit 43.1 %      MCV 86.5 fL      MCH 31.1 pg      MCHC 36.0 g/dL      RDW 11.6 %      RDW-SD 36.4 fl      MPV 10.4 fL      Platelets 160 10*3/mm3      Neutrophil % 62.2 %      Lymphocyte % 25.2 %      Monocyte % 9.9 %      Eosinophil % 1.7 %      Basophil % 0.5 %      Immature Grans % 0.5 %      Neutrophils, Absolute 4.07 10*3/mm3      Lymphocytes, Absolute 1.65 10*3/mm3      Monocytes, Absolute 0.65 10*3/mm3      Eosinophils, Absolute 0.11 10*3/mm3      Basophils, Absolute 0.03 10*3/mm3      Immature Grans, Absolute 0.03 10*3/mm3      nRBC 0.0 /100 WBC     POC Glucose Once [[id number]]  (Abnormal) Collected: 03/10/24 0413    Specimen: Blood Updated: 03/10/24 0425     Glucose 257 mg/dL      Comment: : [id number] Cade RebaMeter ID: [id number]             Imaging Results (Last 24 Hours)       Procedure Component Value Units Date/Time    CT Head Without Contrast [[id number]] Resulted: 03/10/24 0506     Updated: 03/10/24 0509          I have personally reviewed and interpreted the radiology studies and ECG obtained at time of admission.     Assessment / Plan   Assessment:   Active Hospital Problems    Diagnosis     **Stroke     HTN (hypertension), benign     Gastroesophageal reflux disease with esophagitis      "Controlled type 2 diabetes mellitus without complication, without long-term current use of insulin      Treatment Plan  The patient will be admitted to my service here at [facility].   Vitals and neuro checks every 4 hours  PT/OT/SLP evaluation  NPO until SLP evaluation  IVF KVO prn  Lipid panel, A1C check  Neurology consult and work up  MRI brain/CTA brain and neck  Echocardiogram 2D    Afib rate control > Start Eliquis    DM \"borderline\" morning blood sugar 286 concerning for DM 2  Accu check q6h  Humalog low dose sliding scale  Hypoglycemia protocol    DVT prophylaxis > Patient anticoagulated    Medical Decision Making  Number and Complexity of problems: 3 complex medical problems  Differential Diagnosis: see above    Conditions and Status        Condition is unchanged.     MDM Data  External documents reviewed: [employer] EHR  Cardiac tracing (EKG, telemetry) interpretation: Afib ventricular rate 80 bpm  Radiology interpretation: See above  Labs reviewed: See above  Any tests that were considered but not ordered: none     Decision rules/scores evaluated (example [id number], [place], etc): [id number] 4-5     Discussed with: Patient and wife at bedside     Care Planning  Shared decision making: Patient  Code status and discussions: Full code    Disposition  Social Determinants of Health that impact treatment or disposition: none  Estimated length of stay is to be determined, likely over 2 midnights.     I confirmed that the patient's advanced care plan is present, code status is documented, and a surrogate decision maker is listed in the patient's medical record.     The patient's surrogate decision maker is Leesa Amaya, spouse.     The patient was seen and examined by me on 3/10/2024 at 0655 AM.    Electronically signed by Stoney Chavez MD, 03/10/24, 07:55 CDT.            "

## 2024-03-10 NOTE — THERAPY EVALUATION
Acute Care - Speech Language Pathology   Swallow Initial Evaluation [facility]     Patient Name: Rikki Amaya  : 1955  MRN: [id number]  Today's Date: 3/10/2024               Admit Date: 3/10/2024    SPEECH-LANGUAGE PATHOLOGY EVALUATION - SWALLOW  Subjective: The patient was seen on this date for a Clinical Swallow evaluation.  Patient was alert and cooperative.  Significant history: admit with CVA symptoms including left facial drooping.  Passed swallow screening even though droop present, therefore completed evaluation.   Objective: Oral motor examination results: left facial asymmetry .  Textures given during assessment of swallow function included thin liquid, nectar thick liquid, honey thick liquid, puree consistency, and regular consistency.  Assessment: Difficulties were noted with thin liquid and nectar thick liquid.  Observations: Overt strong coughing following thin liquids via straw.  Mild cough and throat clearing with nectar x1 but multiple other trials did not show overt s/s of aspiration.  Good mastication of solids.  No pocketing observed though may be some risk due to droop.  Sensation appears intact.  SLP Findings:  Patient presents with mild to moderate oropharyngeal dysphagia, with esophageal component.   Recommendations: Diet Textures: regular and nectar thick liquids  Medications should be taken whole with puree. May have sips of water between meals and/or after oral care, under staff or family supervision and with the recommended strategies for safe swallowing.   Recommended Strategies: upright for PO, small bites and sips, lingual sweep left, and place bolus on right. Oral care after meals.  Other Recommended Evaluations:  n/a     Dysphagia therapy is recommended.    CESAR Neves/SLP, [facility] 3/10/2024 10:59 CDT    Visit Dx:     ICD-10-CM ICD-9-CM   1. Facial droop  R29.810 781.94   2. Stroke-like symptoms  R29.90 781.99   3. Atrial fibrillation, unspecified type  I48.91 427.31    4. Impaired functional mobility and activity tolerance [Z74.09]  Z74.09 V49.89   5. Oropharyngeal dysphagia  R13.12 787.22     Patient Active Problem List   Diagnosis    Lichen planus    Laryngopharyngeal reflux    HTN (hypertension), benign    Barbosa's esophagus without dysplasia    Controlled type 2 diabetes mellitus without complication, without long-term current use of insulin    Hx of colonic polyps    Gastroesophageal reflux disease with esophagitis    Stroke    TIA (transient ischemic attack)     Past Medical History:   Diagnosis Date    Basal cell carcinoma     Hyperlipidemia     Hypertension     Kidney stone     Stroke 3/10/2024     Past Surgical History:   Procedure Laterality Date    COLONOSCOPY  10/30/2015    Normal exam repeat in 5 years    COLONOSCOPY N/A 11/22/2022    Procedure: COLONOSCOPY WITH ANESTHESIA;  Surgeon: Kelivn Mello MD;  Location: [facility] ENDOSCOPY;  Service: Gastroenterology;  Laterality: N/A;  pre: hx polyps  post: polyps  Chito Finley MD     ENDOSCOPY  10/30/2015    Fundic glad polyp stomach    ENDOSCOPY N/A 11/22/2022    Procedure: ESOPHAGOGASTRODUODENOSCOPY WITH ANESTHESIA;  Surgeon: Kelvin Mello MD;  Location: [facility] ENDOSCOPY;  Service: Gastroenterology;  Laterality: N/A;  pre: reflux  post: gastric polyps. dilation.   Chito Finley MD     GANGLION CYST EXCISION      KNEE ARTHROPLASTY      NECK SURGERY      SHOULDER SURGERY      SKIN CANCER EXCISION      UMBILICAL HERNIA REPAIR         SLP Recommendation and Plan  SLP Swallowing Diagnosis: mild-moderate, oral dysphagia, pharyngeal dysphagia (03/10/24 0900)  SLP Diet Recommendation: regular textures, nectar thick liquids, water between meals after oral care, with supervision (03/10/24 0900)  Recommended Precautions and Strategies: upright posture during/after eating, small bites of food and sips of liquid (03/10/24 0900)  SLP Rec. for Method of Medication Administration: meds whole, with thick liquids, with  puree (03/10/24 0900)     Monitor for Signs of Aspiration: notify SLP if any concerns (03/10/24 0900)     Swallow Criteria for Skilled Therapeutic Interventions Met: demonstrates skilled criteria (03/10/24 0900)  Anticipated Discharge Disposition (SLP): unknown (03/10/24 0900)  Rehab Potential/Prognosis, Swallowing: good, to achieve stated therapy goals (03/10/24 0900)  Therapy Frequency (Swallow): 3 days per week (03/10/24 0900)  Predicted Duration Therapy Intervention (Days): until discharge (03/10/24 0900)  Oral Care Recommendations: Oral Care before breakfast, after meals and PRN (03/10/24 0900)                                        Plan of Care Reviewed With: patient, spouse  Progress: improving      SWALLOW EVALUATION (Last 72 Hours)       SLP Adult Swallow Evaluation       Row Name 03/10/24 0900                   Rehab Evaluation    Document Type evaluation  -KW        Subjective Information no complaints  -KW        Patient Observations alert;cooperative  -KW        Patient/Family/Caregiver Comments/Observations wife present  -KW        Patient Effort good  -KW           General Information    Patient Profile Reviewed yes  -KW        Pertinent History Of Current Problem admit with left side weakness, passed swallow screen but presents with left facial droop.  -KW        Current Method of Nutrition NPO  -KW        Precautions/Limitations, Vision WFL;for purposes of eval  -KW        Precautions/Limitations, Hearing WFL;for purposes of eval  -KW        Prior Level of Function-Communication motor speech impairment  -KW        Prior Level of Function-Swallowing no diet consistency restrictions  -KW        Plans/Goals Discussed with patient and family  -KW        Barriers to Rehab none identified  -KW        Patient's Goals for Discharge return to all previous roles/activities  -KW        Family Goals for Discharge patient able to return to all previous activities/roles  -KW           Oral Motor Structure and  Function    Dentition Assessment natural, present and adequate  -KW        Secretion Management WNL/WFL  -KW        Mucosal Quality moist, healthy  -KW           Oral Musculature and Cranial Nerve Assessment    Oral Motor General Assessment oral labial or buccal impairment  -KW        Oral Labial or Buccal Impairment, Detail, Cranial Nerve VII (Facial): left labial droop  -KW           General Eating/Swallowing Observations    Eating/Swallowing Skills fed by SLP  -KW        Positioning During Eating upright in bed  -KW        Utensils Used spoon;straw  -KW        Consistencies Trialed regular textures;thin liquids;nectar/syrup-thick liquids;honey-thick liquids;pudding thick  -KW           Clinical Swallow Eval    Oral Prep Phase WFL  -KW        Oral Transit WFL  -KW        Oral Residue WFL  -KW        Pharyngeal Phase suspected pharyngeal impairment  -KW        Esophageal Phase suspected esophageal impairment  -KW           Pharyngeal Phase Concerns    Pharyngeal Phase Concerns cough;throat clear  -KW        Cough thin;nectar  -KW        Throat Clear nectar  -KW           Esophageal Phase Concerns    Esophageal Phase Concerns globus  -KW        Esophageal Phase Concerns, Comment chronic  -KW           SLP Evaluation Clinical Impression    SLP Swallowing Diagnosis mild-moderate;oral dysphagia;pharyngeal dysphagia  -KW        Functional Impact risk of aspiration/pneumonia  -KW        Rehab Potential/Prognosis, Swallowing good, to achieve stated therapy goals  -KW        Swallow Criteria for Skilled Therapeutic Interventions Met demonstrates skilled criteria  -KW           Recommendations    Therapy Frequency (Swallow) 3 days per week  -KW        Predicted Duration Therapy Intervention (Days) until discharge  -KW        SLP Diet Recommendation regular textures;nectar thick liquids;water between meals after oral care, with supervision  -KW        Recommended Precautions and Strategies upright posture during/after  eating;small bites of food and sips of liquid  -KW        Oral Care Recommendations Oral Care before breakfast, after meals and PRN  -KW        SLP Rec. for Method of Medication Administration meds whole;with thick liquids;with puree  -KW        Monitor for Signs of Aspiration notify SLP if any concerns  -KW        Anticipated Discharge Disposition (SLP) unknown  -KW           Swallow Goals (SLP)    Swallow LTGs Patient will demonstrate functional swallow for  -KW        Swallow STGs diet tolerance goal selection (SLP);labial strengthening goal selection (SLP);lingual strengthening goal selection (SLP);pharyngeal strengthening exercise goal selection (SLP);swallow compensatory strategies goal selection (SLP)  -KW        Diet Tolerance Goal Selection (SLP) Patient will tolerate trials of  -KW        Labial Strengthening Goal Selection (SLP) labial strengthening, SLP goal 1  -KW        Lingual Strengthening Goal Selection (SLP) lingual strengthening, SLP goal 1  -KW        Pharyngeal Strengthening Exercise Goal Selection (SLP) pharyngeal strengthening exercise, SLP goal 1  -KW        Swallow Compensatory Strategies Goal Selection (SLP) swallow compensatory strategies, SLP goal 1  -KW           (LTG) Patient will demonstrate functional swallow for    Diet Texture (Demonstrate functional swallow) regular textures  -KW        Liquid viscosity (Demonstrate functional swallow) thin liquids  -KW        [place] (Demonstrate functional swallow) independently (over 90% accuracy)  -KW        Time Frame (Demonstrate functional swallow) by discharge  -KW        Progress/Outcomes (Demonstrate functional swallow) new goal  -KW           (STG) Patient will tolerate trials of    Consistencies Trialed (Tolerate trials) regular textures;thin liquids;nectar/ mildly thick liquids  -KW        Desired Outcome (Tolerate trials) without signs/symptoms of aspiration  -KW        [place] (Tolerate trials) independently (over 90%  accuracy)  -KW        Time Frame (Tolerate trials) by discharge  -KW        Progress/Outcomes (Tolerate trials) new goal  -KW           (STG) Labial Strengthening Goal 1 (SLP)    Activity (Labial Strengthening Goal 1, SLP) increase labial tone  -KW        Increase Labial Tone labial resistance exercises  -KW        [place]/Accuracy (Labial Strengthening Goal 1, SLP) independently (over 90% accuracy)  -KW        Time Frame (Labial Strengthening Goal 1, SLP) short term goal (STG);by discharge  -KW        Progress/Outcomes (Labial Strengthening Goal 1, SLP) new goal  -KW           (STG) Lingual Strengthening Goal 1 (SLP)    Activity (Lingual Strengthening Goal 1, SLP) increase lingual tone/sensation/control/coordination/movement  -KW        Increase Lingual Tone/Sensation/Control/Coordination/Movement lingual movement exercises  -KW        [place]/Accuracy (Lingual Strengthening Goal 1, SLP) independently (over 90% accuracy)  -KW        Time Frame (Lingual Strengthening Goal 1, SLP) short term goal (STG);by discharge  -KW        Progress/Outcomes (Lingual Strengthening Goal 1, SLP) new goal  -KW           (STG) Pharyngeal Strengthening Exercise Goal 1 (SLP)    Activity (Pharyngeal Strengthening Goal 1, SLP) increase tongue base retraction  -KW        Increase Tongue Base Retraction hard effortful swallow;diamond  -KW        [place]/Accuracy (Pharyngeal Strengthening Goal 1, SLP) independently (over 90% accuracy)  -KW        Time Frame (Pharyngeal Strengthening Goal 1, SLP) by discharge;short term goal (STG)  -KW        Progress/Outcomes (Pharyngeal Strengthening Goal 1, SLP) new goal  -KW           (STG) Swallow Compensatory Strategies Goal 1 (SLP)    Activity (Swallow Compensatory Strategies/Techniques Goal 1, SLP) during p.o. trials;during meal intake;small bites;small cup sips;small straw sips;food/liquid placed on stronger right side;alternate food/liquid intake;chin tuck posture  -KW         [place]/Accuracy (Swallow Compensatory Strategies/Techniques Goal 1, SLP) independently (over 90% accuracy)  -KW        Time Frame (Swallow Compensatory Strategies/Techniques Goal 1, SLP) by discharge  -KW        Progress/Outcomes (Swallow Compensatory Strategies/Techniques Goal 1, SLP) new goal  -KW                  User Key  (r) = Recorded By, (t) = Taken By, (c) = Cosigned By      Initials Name Effective Dates    JACKY Felix Ginaesdras BATISTA MS-CCC/SLP, [facility] 07/11/23 -                     EDUCATION  The patient has been educated in the following areas:   Dysphagia (Swallowing Impairment).        SLP GOALS       Row Name 03/10/24 0900             (LTG) Patient will demonstrate functional swallow for    Diet Texture (Demonstrate functional swallow) regular textures  -KW      Liquid viscosity (Demonstrate functional swallow) thin liquids  -KW      [place] (Demonstrate functional swallow) independently (over 90% accuracy)  -KW      Time Frame (Demonstrate functional swallow) by discharge  -KW      Progress/Outcomes (Demonstrate functional swallow) new goal  -KW         (STG) Patient will tolerate trials of    Consistencies Trialed (Tolerate trials) regular textures;thin liquids;nectar/ mildly thick liquids  -KW      Desired Outcome (Tolerate trials) without signs/symptoms of aspiration  -KW      [place] (Tolerate trials) independently (over 90% accuracy)  -KW      Time Frame (Tolerate trials) by discharge  -KW      Progress/Outcomes (Tolerate trials) new goal  -KW         (STG) Labial Strengthening Goal 1 (SLP)    Activity (Labial Strengthening Goal 1, SLP) increase labial tone  -KW      Increase Labial Tone labial resistance exercises  -KW      [place]/Accuracy (Labial Strengthening Goal 1, SLP) independently (over 90% accuracy)  -KW      Time Frame (Labial Strengthening Goal 1, SLP) short term goal (STG);by discharge  -KW      Progress/Outcomes (Labial Strengthening Goal 1, SLP) new goal  -KW          (STG) Lingual Strengthening Goal 1 (SLP)    Activity (Lingual Strengthening Goal 1, SLP) increase lingual tone/sensation/control/coordination/movement  -KW      Increase Lingual Tone/Sensation/Control/Coordination/Movement lingual movement exercises  -KW      [place]/Accuracy (Lingual Strengthening Goal 1, SLP) independently (over 90% accuracy)  -KW      Time Frame (Lingual Strengthening Goal 1, SLP) short term goal (STG);by discharge  -KW      Progress/Outcomes (Lingual Strengthening Goal 1, SLP) new goal  -KW         (STG) Pharyngeal Strengthening Exercise Goal 1 (SLP)    Activity (Pharyngeal Strengthening Goal 1, SLP) increase tongue base retraction  -KW      Increase Tongue Base Retraction hard effortful swallow;diamond  -KW      [place]/Accuracy (Pharyngeal Strengthening Goal 1, SLP) independently (over 90% accuracy)  -KW      Time Frame (Pharyngeal Strengthening Goal 1, SLP) by discharge;short term goal (STG)  -KW      Progress/Outcomes (Pharyngeal Strengthening Goal 1, SLP) new goal  -KW         (STG) Swallow Compensatory Strategies Goal 1 (SLP)    Activity (Swallow Compensatory Strategies/Techniques Goal 1, SLP) during p.o. trials;during meal intake;small bites;small cup sips;small straw sips;food/liquid placed on stronger right side;alternate food/liquid intake;chin tuck posture  -KW      [place]/Accuracy (Swallow Compensatory Strategies/Techniques Goal 1, SLP) independently (over 90% accuracy)  -KW      Time Frame (Swallow Compensatory Strategies/Techniques Goal 1, SLP) by discharge  -KW      Progress/Outcomes (Swallow Compensatory Strategies/Techniques Goal 1, SLP) new goal  -KW                User Key  (r) = Recorded By, (t) = Taken By, (c) = Cosigned By      Initials Name Provider Type    Gina Sales MS-CCC/SLP, CNT Speech and Language Pathologist                       Time Calculation:    Time Calculation- SLP       Row Name 03/10/24 1635             Time Calculation- SLP    SLP  Start Time 0900  -KW      SLP Stop Time 1000  -KW      SLP Time Calculation (min) 60 min  -KW      SLP Received On 03/10/24  -KW      SLP Goal Re-Cert Due Date 03/20/24  -KW         Untimed Charges    98342-QI Eval Oral Pharyng Swallow Minutes 60  -KW         Total Minutes    Untimed Charges Total Minutes 60  -KW       Total Minutes 60  -KW                User Key  (r) = Recorded By, (t) = Taken By, (c) = Cosigned By      Initials Name Provider Type    Gina Sales MS-CCC/SLP, DANIEL Speech and Language Pathologist                    Therapy Charges for Today       Code Description Service Date Service Provider Modifiers Qty    [id number] HC ST EVAL ORAL PHARYNG SWALLOW 4 3/10/2024 Gina Felix MS-CCC/SLP, DANIEL GN 1                 Gina Wurth, MS-CCC/SLP, DANIEL  3/10/2024

## 2024-03-10 NOTE — ED PROVIDER NOTES
Subjective   History of Present Illness  Patient presents due to facial droop.  Left-sided.  Noticed it when he got up around 4 AM.  Did not go to bed with it.  Also presents in A-fib, and does not know about this.  Does not have a history of A-fib.  No pain anywhere.  He says he has occasional chest tightness.  No swelling in his legs, shortness of breath.  No head injuries or syncope.  He went to bed normal around 9 PM.    Review of Systems   Constitutional:  Negative for chills and fever.   Respiratory:  Negative for cough and shortness of breath.    Cardiovascular:  Negative for palpitations and leg swelling.   Gastrointestinal:  Negative for abdominal pain and vomiting.   Genitourinary:  Negative for difficulty urinating and dysuria.   Neurological:  Positive for facial asymmetry. Negative for syncope.       Past Medical History:   Diagnosis Date    Basal cell carcinoma     Hyperlipidemia     Hypertension     Kidney stone     Stroke 3/10/2024       No Known Allergies    Past Surgical History:   Procedure Laterality Date    COLONOSCOPY  10/30/2015    Normal exam repeat in 5 years    COLONOSCOPY N/A 11/22/2022    Procedure: COLONOSCOPY WITH ANESTHESIA;  Surgeon: Kelvin Mello MD;  Location: [facility] ENDOSCOPY;  Service: Gastroenterology;  Laterality: N/A;  pre: hx polyps  post: polyps  Chito Finley MD     ENDOSCOPY  10/30/2015    Fundic glad polyp stomach    ENDOSCOPY N/A 11/22/2022    Procedure: ESOPHAGOGASTRODUODENOSCOPY WITH ANESTHESIA;  Surgeon: Kelvin Mello MD;  Location: [facility] ENDOSCOPY;  Service: Gastroenterology;  Laterality: N/A;  pre: reflux  post: gastric polyps. dilation.   Chito Finley MD     GANGLION CYST EXCISION      KNEE ARTHROPLASTY      NECK SURGERY      SHOULDER SURGERY      SKIN CANCER EXCISION      UMBILICAL HERNIA REPAIR         Family History   Problem Relation Age of Onset    Hypertension Mother     Melanoma Father     Colon cancer Neg Hx        Social History      Socioeconomic History    Marital status:    Tobacco Use    Smoking status: Never    Smokeless tobacco: Former     Types: Chew   Vaping Use    Vaping status: Never Used   Substance and Sexual Activity    Alcohol use: No    Drug use: No    Sexual activity: Defer           Objective   Physical Exam  Vitals reviewed.   Constitutional:       General: He is not in acute distress.  HENT:      Head: Normocephalic and atraumatic.   Eyes:      Extraocular Movements: Extraocular movements intact.      Conjunctiva/sclera: Conjunctivae normal.   Cardiovascular:      Pulses: Normal pulses.      Heart sounds: Normal heart sounds.   Pulmonary:      Effort: Pulmonary effort is normal. No respiratory distress.      Breath sounds: Normal breath sounds. No wheezing.   Abdominal:      General: Abdomen is flat. There is no distension.      Tenderness: There is no abdominal tenderness.   Musculoskeletal:      Cervical back: Normal range of motion and neck supple.   Skin:     General: Skin is warm and dry.   Neurological:      General: No focal deficit present.      Mental Status: He is alert. Mental status is at baseline.      Comments: Right upper extremity: 5/5 strength with handgrip and flexion/extension of shoulders, elbows.   Light touch sensation intact and equal when compared to the left upper extremity.    Left upper extremity: 5/5 strength with handgrip and flexion/extension of shoulders, elbows.   Light touch sensation intact and equal when compared to the right upper extremity.    Right lower extremity: 5/5 strength with flexion/extension of hips, knees, and dorsi/plantarflexion of ankles. Able to wiggle toes.   Light touch sensation intact and equal when compared to the left lower extremity.    Left lower extremity: 5/5 strength with flexion/extension of hips, knees, and dorsi/plantarflexion of ankles. Able to wiggle toes.   Light touch sensation intact and equal when compared to the right lower extremity.    Light  sensation intact in bilateral face. CN 2-12 normal except for mild left sided facial droop. FNF normal. Heel-shin normal.   No peripheral field cuts  Speech and attention normal   Psychiatric:         Behavior: Behavior normal.         Thought Content: Thought content normal.         Procedures           ED Course                          Total (NIH Stroke Scale): 2                  Medical Decision Making  Problems Addressed:  Atrial fibrillation, unspecified type: complicated acute illness or injury  Facial droop: complicated acute illness or injury  Stroke-like symptoms: complicated acute illness or injury    Amount and/or Complexity of Data Reviewed  Labs: ordered.  Radiology: ordered.    Risk  Decision regarding hospitalization.      Rikki Amaya is a 68 y.o. male with PMH above who presents to the Emergency Department with facial droop.  ECG shows A-fib.  Not on a blood thinner.  Likely stroke.  There is forehead sparing so Bell's palsy is unlikely.  An stroke scale is a 1, not a candidate for tPA or thrombectomy.  Labs and CT head ordered given differential of intracranial hemorrhage.    ED Course:   -Labs show elevated BNP.  CT head without any acute intercranial normality my review.  Plan is for admission for management of new onset afib with likely stroke.      Final diagnosis: Stroke    All questions answered. Patient/family was understanding and in agreement with today's assessment and plan. The patient was monitored during their stay in the ED and dispositioned without acute event.    Electronically signed by:  Grady Pacheco MD 3/11/2024 03:53 CDT      Note: Dragon medical dictation software was used in the creation of this note.      Final diagnoses:   Facial droop   Stroke-like symptoms   Atrial fibrillation, unspecified type       ED Disposition  ED Disposition       ED Disposition   Decision to Admit    Condition   --    Comment   Level of Care: Remote Telemetry [26]   Diagnosis: TIA  (transient ischemic attack) [713461]   Admitting Physician: LUIS FRITZ [[id number]]   Attending Physician: ULIS FRITZ [[id number]]   Certification: I Certify That Inpatient Hospital Services Are Medically Necessary For Greater Than 2 Midnights                 No follow-up provider specified.       Medication List      No changes were made to your prescriptions during this visit.            Grady Pacheco MD  03/11/24 0353

## 2024-03-10 NOTE — THERAPY EVALUATION
Patient Name: Rikki Amaya  : 1955    MRN: [id number]                              Today's Date: 3/10/2024       Admit Date: 3/10/2024    Visit Dx:     ICD-10-CM ICD-9-CM   1. Facial droop  R29.810 781.94   2. Stroke-like symptoms  R29.90 781.99   3. Atrial fibrillation, unspecified type  I48.91 427.31   4. Impaired functional mobility and activity tolerance [Z74.09]  Z74.09 V49.89   5. Oropharyngeal dysphagia  R13.12 787.22     Patient Active Problem List   Diagnosis    Lichen planus    Laryngopharyngeal reflux    HTN (hypertension), benign    Barbosa's esophagus without dysplasia    Controlled type 2 diabetes mellitus without complication, without long-term current use of insulin    Hx of colonic polyps    Gastroesophageal reflux disease with esophagitis    Stroke    TIA (transient ischemic attack)     Past Medical History:   Diagnosis Date    Basal cell carcinoma     Hyperlipidemia     Hypertension     Kidney stone     Stroke 3/10/2024     Past Surgical History:   Procedure Laterality Date    COLONOSCOPY  10/30/2015    Normal exam repeat in 5 years    COLONOSCOPY N/A 2022    Procedure: COLONOSCOPY WITH ANESTHESIA;  Surgeon: Kelvin Mello MD;  Location:  [facility];  Service: Gastroenterology;  Laterality: N/A;  pre: hx polyps  post: polyps  Chito Finley MD     ENDOSCOPY  10/30/2015    Fundic glad polyp stomach    ENDOSCOPY N/A 2022    Procedure: ESOPHAGOGASTRODUODENOSCOPY WITH ANESTHESIA;  Surgeon: Kelvin Mello MD;  Location:  [facility];  Service: Gastroenterology;  Laterality: N/A;  pre: reflux  post: gastric polyps. dilation.   Chito Finley MD     GANGLION CYST EXCISION      KNEE ARTHROPLASTY      NECK SURGERY      SHOULDER SURGERY      SKIN CANCER EXCISION      UMBILICAL HERNIA REPAIR        General Information       Row Name 03/10/24 0820          OT Time and Intention    Document Type evaluation  -CH     Mode of Treatment occupational therapy  -CH        Row Name 03/10/24 0820          General Information    Patient Profile Reviewed yes  -     Prior Level of Function independent:;all household mobility;community mobility;driving;ADL's  -     Existing Precautions/Restrictions fall  -     Barriers to Rehab medically complex  -       Row Name 03/10/24 0820          Occupational Profile    Reason for Services/Referral (Occupational Profile) facial droop  -     Environmental Supports and Barriers (Occupational Profile) walk in shower with built in seat (family reports built in seat is low and small)  -       Row Name 03/10/24 0820          Living Environment    People in Home spouse  -       Row Name 03/10/24 0820          Home Main Entrance    Number of Stairs, Main Entrance one  -       Row Name 03/10/24 0820          Stairs Within Home, Primary    Number of Stairs, Within Home, Primary one  -       Row Name 03/10/24 0820          Cognition    Orientation Status (Cognition) oriented to;person;place;situation;time;oriented x 4  -       Row Name 03/10/24 0820          Safety Issues, Functional Mobility    Impairments Affecting Function (Mobility) balance;endurance/activity tolerance  -               User Key  (r) = Recorded By, (t) = Taken By, (c) = Cosigned By      Initials Name Provider Type     Radha Mota, OTR/L Occupational Therapist                     Mobility/ADL's       Row Name 03/10/24 0820          Bed Mobility    Bed Mobility supine-sit;scooting/bridging  -     Scooting/Bridging [place] (Bed Mobility) standby assist;contact guard;verbal cues  -     Supine-Sit [place] (Bed Mobility) minimum assist (75% patient effort);verbal cues;nonverbal cues (demo/gesture)  -     Sit-Supine [place] (Bed Mobility) standby assist;verbal cues  -     Assistive Device (Bed Mobility) head of bed elevated;bed rails  -       Row Name 03/10/24 0820          Transfers    Transfers sit-stand transfer;stand-sit transfer  -        Row Name 03/10/24 0820          Sit-Stand Transfer    Sit-Stand [place] (Transfers) contact guard;standby assist;verbal cues  -       Row Name 03/10/24 0820          Stand-Sit Transfer    Stand-Sit [place] (Transfers) standby assist  -[facility] Name 03/10/24 0820          Functional Mobility    Functional Mobility- Ind. Level standby assist;contact guard assist  -     Functional Mobility- Comment Pt. grazed or bumped into objects on L side of body 4x  -[facility] Name 03/10/24 0820          Activities of Daily Living    BADL Assessment/Intervention lower body dressing  -[facility] Name 03/10/24 0820          Lower Body Dressing Assessment/Training    [place] Level (Lower Body Dressing) contact guard assist;doff;socks  -     Position (Lower Body Dressing) edge of bed sitting  -               User Key  (r) = Recorded By, (t) = Taken By, (c) = Cosigned By      Initials Name Provider Type     Radha Mota, OTR/L Occupational Therapist                   Obj/Interventions       [facility] Name 03/10/24 0820          Sensory Assessment (Somatosensory)    Sensory Assessment (Somatosensory) UE sensation intact  -[facility] Name 03/10/24 0820          Vision Assessment/Intervention    Visual Processing Deficit anali-inattention/neglect, left  -     Vision Assessment Comment Pt able to track in all 4 quadrants, does not appear to have visual field cut but demo's L neglect. During fxl mob pt. grazed or ran into door frames and medical equipment 4x with no self awareness or ability to compensate.  -[facility] Name 03/10/24 0820          Range of Motion Comprehensive    General Range of Motion bilateral upper extremity ROM WFL  -     Comment, General Range of Motion AROM WFL; however delay with AROM at LUE.  Increased time and cueing needed for pt to be able to achieve full range  -[facility] Name 03/10/24 0820          Strength Comprehensive (MMT)    General Manual Muscle Testing (MMT)  Assessment upper extremity strength deficits identified  -     Comment, General Manual Muscle Testing (MMT) Assessment R UE 5/5 L UE 4+/5 wtih extra time to achieve full strength  -       Row Name 03/10/24 0820          Motor Skills    Motor Skills coordination;neuro-muscular function  -     Coordination left;fine motor deficit;gross motor deficit;minimal impairment;finger to nose  -     Neuromuscular Function left;upper extremity  -       Row Name 03/10/24 0820          Balance    Balance Assessment sitting static balance;sitting dynamic balance;sit to stand dynamic balance;standing static balance;standing dynamic balance  -     Static Sitting Balance supervision  -     Dynamic Sitting Balance minimal assist  -     Position, Sitting Balance unsupported;sitting edge of bed  -     Static Standing Balance contact guard;verbal cues  -     Dynamic Standing Balance contact guard;verbal cues  -     Position/Device Used, Standing Balance unsupported  -     Comment, Balance While seated EOB L shoulder retracted required tactile cues to correct.  During dynamic sitting pt had multiple posterior LOBs and required assist to correct.  -               User Key  (r) = Recorded By, (t) = Taken By, (c) = Cosigned By      Initials Name Provider Type     Radha Mota, OTR/L Occupational Therapist                   Goals/Plan       Row Name 03/10/24 0820          Dressing Goal 1 (OT)    Activity/Device (Dressing Goal 1, OT) dressing skills, all  -     [place]/Cues Needed (Dressing Goal 1, OT) set-up required  -     Time Frame (Dressing Goal 1, OT) long term goal (LTG);by discharge  -     Progress/Outcome (Dressing Goal 1, OT) new goal  -       Row Name 03/10/24 0820          Grooming Goal 1 (OT)    Activity/Device (Grooming Goal 1, OT) grooming skills, all  -     [place] (Grooming Goal 1, OT) supervision required;verbal cues required  -     Time Frame (Grooming Goal 1, OT) long term  goal (LTG);by discharge  -     Strategies/Barriers (Grooming Goal 1, OT) pt able to scan to find needed items on L side to complete tasks  -CH     Progress/Outcome (Grooming Goal 1, OT) new goal  -CH       Row Name 03/10/24 0820          Self-Feeding Goal 1 (OT)    Activity/Device (Self-Feeding Goal 1, OT) self-feeding skills, all;finger foods;liquids to mouth;scoop food and bring to mouth  -CH     [place] Level/Cues Needed (Self-Feeding Goal 1, OT) modified independence  -CH     Time Frame (Self-Feeding Goal 1, OT) long term goal (LTG);by discharge  -CH     Strategies/Barriers (Self-Feeding Goal 1, OT) Pt able to scan & perform bimanual tasks while initiating use of L UE  -CH     Progress/Outcomes (Self-Feeding Goal 1, OT) new goal  -CH       Row Name 03/10/24 0820          Problem Specific Goal 1 (OT)    Problem Specific Goal 1 (OT) Pt will complete HEP I'ly with handout  -CH     Time Frame (Problem Specific Goal 1, OT) long term goal (LTG)  -CH     Progress/Outcome (Problem Specific Goal 1, OT) new goal  -       Row Name 03/10/24 0820          Therapy Assessment/Plan (OT)    Planned Therapy Interventions (OT) activity tolerance training;adaptive equipment training;BADL retraining;edema control/reduction;patient/caregiver education/training;occupation/activity based interventions;strengthening exercise;transfer/mobility retraining;functional balance retraining;cognitive/visual perception retraining;neuromuscular control/coordination retraining;ROM/therapeutic exercise  -               User Key  (r) = Recorded By, (t) = Taken By, (c) = Cosigned By      Initials Name Provider Type    CH Radha Mota, OTR/L Occupational Therapist                   Clinical Impression       Row Name 03/10/24 0820          Pain Assessment    Pretreatment Pain Rating 0/10 - no pain  -     Posttreatment Pain Rating 0/10 - no pain  -       Row Name 03/10/24 0820          Plan of Care Review    Plan of Care Reviewed With  patient  -     Progress no change  -     Outcome Evaluation OT eval completed.  Pt. is AxO x 4 & pleasant.  His spouse is present and he has no complaints.  Noteable L sided facial droop.  Mr. Amaya able to follow all commands and converse freely with no noted aphasia.  He demo's a mild weakness in L UE but reports normal sensation.  During AROM testing he achieved WFL; however delay with AROM at LUE. Increased time and cueing needed for pt to be able to achieve full range. Similar during coordination testing - pt able to perform required movements but performance is delayed.  Delay could be attributed to decreased strength, decreased motor planning, & L neglect. Pt able to track in all 4 quadrants, does not appear to have visual field cut but demo's L neglect. While seated EOB L shoulder retracted & required tactile cues to correct to acheive neutral upright posture. During dynamic sitting pt had multiple posterior LOBs and required assist to correct. Pt able to DOFF socks at CGA and demo'd L neglect requiring cues to utilize L UE for ADL perf.  During fxl mob pt. needed CGA and demo'd decreased L arm swing.  He grazed or ran into door frames and medical equipment 4x on L side with no self awareness or ability to compensate.  Mr. Amaya would benefit from cont'd OT tx to improve his indep in self care & fxl mob.  He will require cont'd therapies at [place].  -       Row Name 03/10/24 0820          Therapy Assessment/Plan (OT)    Patient/Family Therapy Goal Statement (OT) improve fxn  -     Rehab Potential (OT) good, to achieve stated therapy goals  -     Criteria for Skilled Therapeutic Interventions Met (OT) yes;skilled treatment is necessary  -     Therapy Frequency (OT) 5 times/wk  -     Predicted Duration of Therapy Intervention (OT) until DC  -       Row Name 03/10/24 0820          Therapy Plan Review/Discharge Plan (OT)    Anticipated Discharge Disposition (OT) --  cont'd OT tx  -       Row Name  03/10/24 0820          Vital Signs    Pre Systolic BP Rehab 138  -CH     Pre Treatment Diastolic BP 69  -CH     Intra Systolic BP Rehab 139  -CH     Intra Treatment Diastolic BP 83  -CH     Pre Patient Position Supine  -CH     Intra Patient Position Sitting  -CH       Row Name 03/10/24 0820          Positioning and Restraints    Pre-Treatment Position in bed  -CH     Post Treatment Position bed  -CH     In Bed fowlers;call light within reach;encouraged to call for assist;side rails up x2;with SLP  -CH               User Key  (r) = Recorded By, (t) = Taken By, (c) = Cosigned By      Initials Name Provider Type    CH Radha Mota, OTR/L Occupational Therapist                   Outcome Measures       Row Name 03/10/24 0820          How much help from another is currently needed...    Putting on and taking off regular lower body clothing? 3  -CH     Bathing (including washing, rinsing, and drying) 3  -CH     Toileting (which includes using toilet bed pan or urinal) 3  -CH     Putting on and taking off regular upper body clothing 3  -CH     Taking care of personal grooming (such as brushing teeth) 3  -CH     Eating meals 3  -CH     AM-PAC 6 Clicks Score (OT) 18  -CH       Row Name 03/10/24 0831 03/10/24 0800       How much help from another person do you currently need...    Turning from your back to your side while in flat bed without using bedrails? 3  -JE 3  -JL    Moving from lying on back to sitting on the side of a flat bed without bedrails? 3  -JE 3  -JL    Moving to and from a bed to a chair (including a wheelchair)? 3  -JE 3  -JL    Standing up from a chair using your arms (e.g., wheelchair, bedside chair)? 3  -JE 3  -JL    Climbing 3-5 steps with a railing? 3  -JE 3  -JL    To walk in hospital room? 3  -JE 3  -JL    AM-PAC 6 Clicks Score (PT) 18  -JE 18  -JL    Highest Level of Mobility Goal 6 --> Walk 10 steps or more  -JE 6 --> Walk 10 steps or more  -JL      Row Name 03/10/24 0641 03/10/24 0632       How  much help from another person do you currently need...    Turning from your back to your side while in flat bed without using bedrails? 4  -LF 4  -LF    Moving from lying on back to sitting on the side of a flat bed without bedrails? 4  -LF 4  -LF    Moving to and from a bed to a chair (including a wheelchair)? 4  -LF 4  -LF    Standing up from a chair using your arms (e.g., wheelchair, bedside chair)? 4  -LF 4  -LF    Climbing 3-5 steps with a railing? 4  -LF 4  -LF    To walk in hospital room? 4  -LF 4  -LF    AM-PAC 6 Clicks Score (PT) 24  -LF 24  -LF    Highest Level of Mobility Goal 8 --> Walked 250 feet or more  -LF 8 --> Walked 250 feet or more  -      Row Name 03/10/24 0831          Modified [place] Scale    Pre-Stroke Modified [place] Scale 0 - No Symptoms at all.  -     Modified [place] Scale 4 - Moderately severe disability.  Unable to walk without assistance, and unable to attend to own bodily needs without assistance.  -       Row Name 03/10/24 0831 03/10/24 0820       Functional Assessment    Outcome Measure Options AM-PAC 6 Clicks Basic Mobility (PT);Modified [place]  -JE AM-PAC 6 Clicks Daily Activity (OT)  -              User Key  (r) = Recorded By, (t) = Taken By, (c) = Cosigned By      Initials Name Provider Type     Radha Mota, OTR/L Occupational Therapist    Nelia Arzola, PT Physical Therapist    Monik Hilliard, RN Registered Nurse    Citlalli Schaffer RN Registered Nurse                    Occupational Therapy Education       Title: PT OT SLP Therapies (In Progress)       Topic: Occupational Therapy (In Progress)       Point: ADL training (Done)       Description:   Instruct learner(s) on proper safety adaptation and remediation techniques during self care or transfers.   Instruct in proper use of assistive devices.                  Learning Progress Summary             Patient Acceptance, E, VU,NR by  at 3/10/2024 1121                         Point: Home exercise  program (Not Started)       Description:   Instruct learner(s) on appropriate technique for monitoring, assisting and/or progressing therapeutic exercises/activities.                  Learner Progress:  Not documented in this visit.              Point: Precautions (Done)       Description:   Instruct learner(s) on prescribed precautions during self-care and functional transfers.                  Learning Progress Summary             Patient Acceptance, E, VU,NR by  at 3/10/2024 1129                         Point: Body mechanics (Not Started)       Description:   Instruct learner(s) on proper positioning and spine alignment during self-care, functional mobility activities and/or exercises.                  Learner Progress:  Not documented in this visit.                              User Key       Initials Effective Dates Name Provider Type Discipline     07/11/23 -  Radha Mota, OTR/L Occupational Therapist OT                  OT Recommendation and Plan  Planned Therapy Interventions (OT): activity tolerance training, adaptive equipment training, BADL retraining, edema control/reduction, patient/caregiver education/training, occupation/activity based interventions, strengthening exercise, transfer/mobility retraining, functional balance retraining, cognitive/visual perception retraining, neuromuscular control/coordination retraining, ROM/therapeutic exercise  Therapy Frequency (OT): 5 times/wk  Plan of Care Review  Plan of Care Reviewed With: patient  Progress: no change  Outcome Evaluation: OT eval completed.  Pt. is AxO x 4 & pleasant.  His spouse is present and he has no complaints.  Noteable L sided facial droop.  Mr. Amaya able to follow all commands and converse freely with no noted aphasia.  He demo's a mild weakness in L UE but reports normal sensation.  During AROM testing he achieved WFL; however delay with AROM at LUE. Increased time and cueing needed for pt to be able to achieve full range.  Similar during coordination testing - pt able to perform required movements but performance is delayed.  Delay could be attributed to decreased strength, decreased motor planning, & L neglect. Pt able to track in all 4 quadrants, does not appear to have visual field cut but demo's L neglect. While seated EOB L shoulder retracted & required tactile cues to correct to acheive neutral upright posture. During dynamic sitting pt had multiple posterior LOBs and required assist to correct. Pt able to DOFF socks at CGA and demo'd L neglect requiring cues to utilize L UE for ADL perf.  During fxl mob pt. needed CGA and demo'd decreased L arm swing.  He grazed or ran into door frames and medical equipment 4x on L side with no self awareness or ability to compensate.  Mr. Amaya would benefit from cont'd OT tx to improve his indep in self care & fxl mob.  He will require cont'd therapies at [place].     Time Calculation:         Time Calculation- OT       Row Name 03/10/24 0820             Time Calculation- OT    OT Start Time 0820  add 5 min for CR  -CH      OT Stop Time 0924  -CH      OT Time Calculation (min) 64 min  -CH      OT Received On 03/10/24  -      OT Goal Re-Cert Due Date 03/20/24  -CH         Timed Charges    40195 - OT Self Care/Mgmt Minutes 9  -CH         Untimed Charges    OT Eval/Re-eval Minutes 60  -CH         Total Minutes    Timed Charges Total Minutes 9  -CH      Untimed Charges Total Minutes 60  -CH       Total Minutes 69  -CH                User Key  (r) = Recorded By, (t) = Taken By, (c) = Cosigned By      Initials Name Provider Type     Radha Mota OTR/L Occupational Therapist                  Therapy Charges for Today       Code Description Service Date Service Provider Modifiers Qty    [id number]  OT SELF CARE/MGMT/TRAIN EA 15 MIN 3/10/2024 Radha Mota OTR/L GO 1    [id number] HC OT EVAL LOW COMPLEXITY 4 3/10/2024 Radha Mota OTR/YVETTE GO 1                 Rahda Mota,  OTR/L  3/10/2024

## 2024-03-11 ENCOUNTER — READMISSION MANAGEMENT (OUTPATIENT)
Dept: CALL CENTER | Facility: HOSPITAL | Age: 69
End: 2024-03-11
Payer: COMMERCIAL

## 2024-03-11 VITALS
OXYGEN SATURATION: 95 % | HEIGHT: 69 IN | HEART RATE: 89 BPM | TEMPERATURE: 97.7 F | WEIGHT: 240 LBS | SYSTOLIC BLOOD PRESSURE: 159 MMHG | DIASTOLIC BLOOD PRESSURE: 98 MMHG | BODY MASS INDEX: 35.55 KG/M2 | RESPIRATION RATE: 18 BRPM

## 2024-03-11 PROBLEM — E78.2 MIXED HYPERLIPIDEMIA: Status: ACTIVE | Noted: 2024-03-11

## 2024-03-11 PROBLEM — I48.91 ATRIAL FIBRILLATION: Status: ACTIVE | Noted: 2024-03-11

## 2024-03-11 LAB
ALBUMIN SERPL-MCNC: 4.1 G/DL (ref 3.5–5.2)
ALBUMIN/GLOB SERPL: 1.5 G/DL
ALP SERPL-CCNC: 60 U/L (ref 39–117)
ALT SERPL W P-5'-P-CCNC: 28 U/L (ref 1–41)
ANION GAP SERPL CALCULATED.3IONS-SCNC: 12 MMOL/L (ref 5–15)
AST SERPL-CCNC: 17 U/L (ref 1–40)
BILIRUB SERPL-MCNC: 0.6 MG/DL (ref 0–1.2)
BUN SERPL-MCNC: 14 MG/DL (ref 8–23)
BUN/CREAT SERPL: 15.1 (ref 7–25)
CALCIUM SPEC-SCNC: 8.7 MG/DL (ref 8.6–10.5)
CHLORIDE SERPL-SCNC: 102 MMOL/L (ref 98–107)
CO2 SERPL-SCNC: 27 MMOL/L (ref 22–29)
CREAT SERPL-MCNC: 0.93 MG/DL (ref 0.76–1.27)
DEPRECATED RDW RBC AUTO: 38.2 FL (ref 37–54)
EGFRCR SERPLBLD CKD-EPI 2021: 88.9 ML/MIN/1.73
ERYTHROCYTE [DISTWIDTH] IN BLOOD BY AUTOMATED COUNT: 11.9 % (ref 12.3–15.4)
GLOBULIN UR ELPH-MCNC: 2.8 GM/DL
GLUCOSE BLDC GLUCOMTR-MCNC: 222 MG/DL (ref 70–130)
GLUCOSE BLDC GLUCOMTR-MCNC: 272 MG/DL (ref 70–130)
GLUCOSE SERPL-MCNC: 201 MG/DL (ref 65–99)
HCT VFR BLD AUTO: 45.8 % (ref 37.5–51)
HGB BLD-MCNC: 15.9 G/DL (ref 13–17.7)
MCH RBC QN AUTO: 30.9 PG (ref 26.6–33)
MCHC RBC AUTO-ENTMCNC: 34.7 G/DL (ref 31.5–35.7)
MCV RBC AUTO: 88.9 FL (ref 79–97)
PLATELET # BLD AUTO: 172 10*3/MM3 (ref 140–450)
PMV BLD AUTO: 11.3 FL (ref 6–12)
POTASSIUM SERPL-SCNC: 3.5 MMOL/L (ref 3.5–5.2)
PROT SERPL-MCNC: 6.9 G/DL (ref 6–8.5)
RBC # BLD AUTO: 5.15 10*6/MM3 (ref 4.14–5.8)
SODIUM SERPL-SCNC: 141 MMOL/L (ref 136–145)
WBC NRBC COR # BLD AUTO: 8.3 10*3/MM3 (ref 3.4–10.8)

## 2024-03-11 PROCEDURE — 97116 GAIT TRAINING THERAPY: CPT

## 2024-03-11 PROCEDURE — 92526 ORAL FUNCTION THERAPY: CPT

## 2024-03-11 PROCEDURE — 80053 COMPREHEN METABOLIC PANEL: CPT | Performed by: FAMILY MEDICINE

## 2024-03-11 PROCEDURE — 63710000001 INSULIN DETEMIR PER 5 UNITS: Performed by: FAMILY MEDICINE

## 2024-03-11 PROCEDURE — 97535 SELF CARE MNGMENT TRAINING: CPT

## 2024-03-11 PROCEDURE — 63710000001 INSULIN REGULAR HUMAN PER 5 UNITS: Performed by: FAMILY MEDICINE

## 2024-03-11 PROCEDURE — 82948 REAGENT STRIP/BLOOD GLUCOSE: CPT

## 2024-03-11 PROCEDURE — 85027 COMPLETE CBC AUTOMATED: CPT | Performed by: FAMILY MEDICINE

## 2024-03-11 RX ORDER — LOSARTAN POTASSIUM 50 MG/1
100 TABLET ORAL
Status: DISCONTINUED | OUTPATIENT
Start: 2024-03-11 | End: 2024-03-11 | Stop reason: HOSPADM

## 2024-03-11 RX ORDER — LEVOTHYROXINE SODIUM 0.03 MG/1
25 TABLET ORAL
Status: DISCONTINUED | OUTPATIENT
Start: 2024-03-11 | End: 2024-03-11 | Stop reason: HOSPADM

## 2024-03-11 RX ORDER — HYDROCHLOROTHIAZIDE 25 MG/1
25 TABLET ORAL
Status: DISCONTINUED | OUTPATIENT
Start: 2024-03-11 | End: 2024-03-11 | Stop reason: HOSPADM

## 2024-03-11 RX ORDER — ATORVASTATIN CALCIUM 80 MG/1
80 TABLET, FILM COATED ORAL NIGHTLY
Qty: 30 TABLET | Refills: 1 | Status: SHIPPED | OUTPATIENT
Start: 2024-03-11

## 2024-03-11 RX ORDER — POTASSIUM CHLORIDE 750 MG/1
40 CAPSULE, EXTENDED RELEASE ORAL ONCE
Status: COMPLETED | OUTPATIENT
Start: 2024-03-11 | End: 2024-03-11

## 2024-03-11 RX ADMIN — APIXABAN 5 MG: 5 TABLET, FILM COATED ORAL at 08:21

## 2024-03-11 RX ADMIN — AMLODIPINE BESYLATE 10 MG: 10 TABLET ORAL at 08:21

## 2024-03-11 RX ADMIN — INSULIN HUMAN 4 UNITS: 100 INJECTION, SOLUTION PARENTERAL at 12:15

## 2024-03-11 RX ADMIN — LEVOTHYROXINE SODIUM 25 MCG: 25 TABLET ORAL at 08:21

## 2024-03-11 RX ADMIN — INSULIN HUMAN 3 UNITS: 100 INJECTION, SOLUTION PARENTERAL at 08:21

## 2024-03-11 RX ADMIN — POTASSIUM CHLORIDE 40 MEQ: 10 CAPSULE, COATED, EXTENDED RELEASE ORAL at 09:17

## 2024-03-11 RX ADMIN — METOPROLOL SUCCINATE 100 MG: 100 TABLET, FILM COATED, EXTENDED RELEASE ORAL at 08:21

## 2024-03-11 RX ADMIN — PANTOPRAZOLE SODIUM 40 MG: 40 TABLET, DELAYED RELEASE ORAL at 08:21

## 2024-03-11 RX ADMIN — INSULIN DETEMIR 10 UNITS: 100 INJECTION, SOLUTION SUBCUTANEOUS at 08:21

## 2024-03-11 RX ADMIN — LOSARTAN POTASSIUM 100 MG: 50 TABLET, FILM COATED ORAL at 08:21

## 2024-03-11 RX ADMIN — HYDROCHLOROTHIAZIDE 25 MG: 25 TABLET ORAL at 08:21

## 2024-03-11 RX ADMIN — Medication 10 ML: at 08:22

## 2024-03-11 NOTE — THERAPY TREATMENT NOTE
Acute Care - Physical Therapy Treatment Note  [facility]     Patient Name: Rikki Amaya  : 1955  MRN: [id number]  Today's Date: 3/11/2024      Visit Dx:     ICD-10-CM ICD-9-CM   1. Facial droop  R29.810 781.94   2. Stroke-like symptoms  R29.90 781.99   3. Atrial fibrillation, unspecified type  I48.91 427.31   4. Impaired functional mobility and activity tolerance [Z74.09]  Z74.09 V49.89   5. Oropharyngeal dysphagia  R13.12 787.22     Patient Active Problem List   Diagnosis    Lichen planus    Laryngopharyngeal reflux    HTN (hypertension), benign    Barbosa's esophagus without dysplasia    Controlled type 2 diabetes mellitus without complication, without long-term current use of insulin    Hx of colonic polyps    Gastroesophageal reflux disease with esophagitis    Stroke    TIA (transient ischemic attack)     Past Medical History:   Diagnosis Date    Basal cell carcinoma     Hyperlipidemia     Hypertension     Kidney stone     Stroke 3/10/2024     Past Surgical History:   Procedure Laterality Date    COLONOSCOPY  10/30/2015    Normal exam repeat in 5 years    COLONOSCOPY N/A 2022    Procedure: COLONOSCOPY WITH ANESTHESIA;  Surgeon: Kelvin Mello MD;  Location: [facility] ENDOSCOPY;  Service: Gastroenterology;  Laterality: N/A;  pre: hx polyps  post: polyps  Chito Finley MD     ENDOSCOPY  10/30/2015    Fundic glad polyp stomach    ENDOSCOPY N/A 2022    Procedure: ESOPHAGOGASTRODUODENOSCOPY WITH ANESTHESIA;  Surgeon: Kelvin Mello MD;  Location: [facility] ENDOSCOPY;  Service: Gastroenterology;  Laterality: N/A;  pre: reflux  post: gastric polyps. dilation.   Chito Finley MD     GANGLION CYST EXCISION      KNEE ARTHROPLASTY      NECK SURGERY      SHOULDER SURGERY      SKIN CANCER EXCISION      UMBILICAL HERNIA REPAIR       PT Assessment (Last 12 Hours)       PT Evaluation and Treatment       Row Name 24 0755          Physical Therapy Time and Intention    Subjective  Information no complaints  -     Document Type therapy note (daily note)  [facility]     Mode of Treatment physical therapy  -[facility] Name 03/11/24 0755          General Information    Existing Precautions/Restrictions fall  -[facility] Name 03/11/24 0755          Pain    Pretreatment Pain Rating 0/10 - no pain  -     Posttreatment Pain Rating 0/10 - no pain  -[facility] Name 03/11/24 0755          Bed Mobility    Supine-Sit [place] (Bed Mobility) supervision  -     Sit-Supine [place] (Bed Mobility) --  chair  -[facility] Name 03/11/24 0755          Transfers    Transfers toilet transfer  -[facility] Name 03/11/24 0755          Sit-Stand Transfer    Sit-Stand [place] (Transfers) standby assist  -       Row Name 03/11/24 0755          Stand-Sit Transfer    Stand-Sit [place] (Transfers) standby assist  -[facility] Name 03/11/24 0755          Toilet Transfer    [place] Level (Toilet Transfer) supervision  -     Assistive Device (Toilet Transfer) commode  -[facility] Name 03/11/24 0755          Gait/Stairs (Locomotion)    [place] Level (Gait) stand assist  [facility]     Distance in Feet (Gait) 200  -[facility] Name 03/11/24 0755          Motor Skills    Additional Documentation Advanced Stepping/Walking Interventions (Group)  -[facility] Name 03/11/24 0755          Advanced Stepping/Walking Interventions    Stepping/Walking Interventions backward walking;box stepping;side stepping  -     Backward Walking (Stepping/Walking Interventions) 20 x 2 [facility]-s[facility]  [facility]     Box Stepping (Stepping/Walking Interventions) stepping over/around glove boxes cga-sba  [facility]     Side Stepping (Stepping/Walking Interventions) 20 x2 cga-sba  -[facility] Name 03/11/24 0755          Plan of Care Review    Plan of Care Reviewed With patient  -     Progress improving  -     Outcome Evaluation pt trans to EOB sba, sit-stand sba, bathroom trans sba, pt amb 200 feet cga-sba, side  stepping,backwards and stepping over glove boxes cga-sba, trans to chair sba, pt would benefit from OP therapy  -       Row Name 03/11/24 0755          Positioning and Restraints    Pre-Treatment Position in bed  -     Post Treatment Position chair  -     In Chair sitting;call light within reach;encouraged to call for assist;exit alarm on;notified [facility]  -               User Key  (r) = Recorded By, (t) = Taken By, (c) = Cosigned By      Initials Name Provider Type    Leesa Mendoza PTA Physical Therapist Assistant                      PT Recommendation and Plan     Plan of Care Reviewed With: patient  Progress: improving  Outcome Evaluation: pt trans to EOB sba, sit-stand sba, bathroom trans sba, pt amb 200 feet cga-sba, side stepping,backwards and stepping over glove boxes cga-sba, trans to chair sba, pt would benefit from OP therapy   Outcome Measures       Row Name 03/11/24 0800             How much help from another person do you currently need...    Turning from your back to your side while in flat bed without using bedrails? 4  -AH      Moving from lying on back to sitting on the side of a flat bed without bedrails? 4  -AH      Moving to and from a bed to a chair (including a wheelchair)? 3  -AH      Standing up from a chair using your arms (e.g., wheelchair, bedside chair)? 3  -AH      Climbing 3-5 steps with a railing? 3  -AH      To walk in hospital room? 3  -      AM-PAC 6 Clicks Score (PT) 20  -      Highest Level of Mobility Goal 6 --> Walk 10 steps or more  -         Functional Assessment    Outcome Measure Options AM-PAC 6 Clicks Basic Mobility (PT)  -                User Key  (r) = Recorded By, (t) = Taken By, (c) = Cosigned By      Initials Name Provider Type    Leesa Mendoza PTA Physical Therapist Assistant                     Time Calculation:    PT Charges       Row Name 03/11/24 0817             Time Calculation    Start Time 0755  -      Stop Time 0818  -      Time  Calculation (min) 23 min  -      PT Received On 03/11/24  -         Time Calculation- PT    Total Timed Code Minutes- PT 23 minute(s)  -         Timed Charges    54301 - Gait Training Minutes  23  -AH         Total Minutes    Timed Charges Total Minutes 23  -AH       Total Minutes 23  -AH                User Key  (r) = Recorded By, (t) = Taken By, (c) = Cosigned By      Initials Name Provider Type     Leesa Mitchell, JON Physical Therapist Assistant                  Therapy Charges for Today       Code Description Service Date Service Provider Modifiers Qty    [id number] HC GAIT TRAINING EA 15 MIN 3/11/2024 Leesa Mitchell PTA GP 2            PT G-Codes  Outcome Measure Options: AM-PAC 6 Clicks Basic Mobility (PT)  AM-PAC 6 Clicks Score (PT): 20  AM-PAC 6 Clicks Score (OT): 18  Modified Raymundo Scale: 4 - Moderately severe disability.  Unable to walk without assistance, and unable to attend to own bodily needs without assistance.    Leesa Mitchell PTA  3/11/2024

## 2024-03-11 NOTE — PROGRESS NOTES
Adult Nutrition  Assessment/PES    Patient Name:  Rikki Amaya  YOB: 1955  MRN: [id number]  Admit Date:  3/10/2024    Assessment Date:  3/11/2024    Comments: Provided diabetes education today. Pt current diet is Cardiac/Diabetic diet with Nectar thickend liquids. Pt's d/c papers state thin liquids. RDN request SLP review with pt if pt can tolerate upgrade to thin liquids or stay on thickened liquids.     Reason for Assessment       Row Name 03/11/24 1125          Reason for Assessment    Reason For Assessment per organizational policy  Stroke protocol     Diagnosis neurologic conditions;diabetes diagnosis/complications     Identified At Risk by Screening Criteria need for education                    Nutrition/Diet History       Row Name 03/11/24 1126          Nutrition/Diet History    Typical Intake (Food/Fluid/EN/PN) Pt admitted with stroke this admission. Pt with new dx of diabetes this admission. 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 Discussed with pt taking his own healthy breakfast to work or eating at home for healthier meals.   Pt current diet Cardiac/CCHO with nectar liquids. D/C plans diet is for thin liquids. This RDN has contacted SLP to check on pt if he can tolerate thin liquids prior to going home.     Functional Status ambulatory                    Labs/Tests/Procedures/Meds       Row Name 03/11/24 1149          Labs/Procedures/Meds    Lab Results Comments glucose,hgbA1c 8.7%        Diagnostic Tests/Procedures    Diagnostic Test/Procedure Reviewed reviewed        Medications    Pertinent Medications Reviewed reviewed     Pertinent Medications Comments see MAR                    Physical Findings       Row Name 03/11/24 1149          Physical Findings    Overall Physical Appearance Baldemar score 20,obese                    Estimated/Assessed Needs - Anthropometrics        Row Name 03/11/24 1150          Anthropometrics    Weight for Calculation 109 kg (240 lb)        Estimated/Assessed Needs    Additional Documentation Protein Requirements (Group);Estimated Calorie Needs (Group);KCAL/KG (Group);Fluid Requirements (Group)        Estimated Calorie Needs    Estimated Calorie Need Method kcal/kg        KCAL/KG    KCAL/KG 15 Kcal/Kg (kcal);18 Kcal/Kg (kcal)     15 Kcal/Kg (kcal) 1632.945     18 Kcal/Kg (kcal) 1959.534        Protein Requirements    Weight Used For Protein Calculations 72.6 kg (160 lb)     Est Protein Requirement Amount (gms/kg) 1.3 gm protein     Estimated Protein Requirements (gms/day) 94.35        Fluid Requirements    Fluid Requirements (mL/day) 1632  7734-3489 ml/day     Estimated Fluid Requirement Method RDA Method     RDA Method (mL) 1632                    Nutrition Prescription Ordered       Row Name 03/11/24 1151          Nutrition Prescription PO    Current PO Diet Regular     Fluid Consistency Nectar/syrup thick     Common Modifiers Cardiac;Consistent Carbohydrate                    Evaluation of Received Nutrient/Fluid Intake       Row Name 03/11/24 1151          Nutrient/Fluid Evaluation    Number of Days Evaluated --  No Fluid intake to review at this time.        PO Evaluation    Number of Days PO Intake Evaluated Insufficient Data  no meal intake documented at this time                   Problem/Interventions:   Problem 1       Row Name 03/11/24 1152          Nutrition Diagnoses Problem 1    Problem 1 Knowledge Deficit     Etiology (related to) Medical Diagnosis     Endocrine DM2;Hyperglycemia     Signs/Symptoms (evidenced by) Demonstrated Information Deficit;Biochemical     Specific Labs Noted Glucose;HgbA1C                      Intervention Goal       Row Name 03/11/24 1153          Intervention Goal    General Reduce/improve symptoms;Meet nutritional needs for age/condition;Disease management/therapy     PO Meet estimated needs;Establish  PO;Tolerate PO     Weight Appropriate weight loss                    Nutrition Intervention       Row Name 03/11/24 1153          Nutrition Intervention    RD/Tech Action Follow Tx progress;Care plan reviewd;Encourage intake                      Education/Evaluation       Row Name 03/11/24 1153          Education    Education Other (comment);Provided education regarding;Education topics  d/c plans for today     Provided education regarding Key food habit change;Avoidance/improvement of symptoms;Avoidance of associated complications;Diet rationale;Healthy eating for diabetes     Education Topics Diabetes        Monitor/Evaluation    Monitor Per protocol     Education Follow-up Other (comment)  Encouraged DM outpt group class/or OP RDN                     Electronically signed by:  Izabel Anand MS,RDN,LD  03/11/24 11:57 CDT

## 2024-03-11 NOTE — PAYOR COMM NOTE
"Rikki Amaya (68 y.o. Male) [id number]    Admit 03/10  [facility]  [place] [phone]  Fax [phone]   Date of Birth   1955    Social Security Number       Address   [address] [place] [address]    Home Phone   [phone]    MRN   [id number]       Holiness   Gnosticism    Marital Status                               Admission Date   3/10/24    Admission Type   Emergency    Admitting Provider   Nino Rousseau MD    Attending Provider   Nino Rousseau MD    Department, Room/Bed   [facility] 3A, 335/1       Discharge Date       Discharge Disposition   Home or Self Care    Discharge Destination                                 Attending Provider: Nino Rousseau MD    Allergies: No Known Allergies    Isolation: None   Infection: None   Code Status: CPR    Ht: 175.3 cm (69\")   Wt: 109 kg (240 lb)    Admission Cmt: None   Principal Problem: Acute right frontal stroke [I63.9]                   Active Insurance as of 3/10/2024       Primary Coverage       Payor Plan Insurance Group Employer/Plan Group    [employer] BLUE CROSS AN[employer]M BLUE CROSS BLUE [employer]IELD PPO [id number]       Payor Plan Address Payor Plan Phone Number Payor Plan Fax Number Effective Dates    [address] [phone]  1/1/2019 - None Entered    [place] [address]         Subscriber Name Subscriber Birth Date Member ID       RIKKI AMAYA 1955 [id number]                     Emergency Contacts        (Rel.) Home Phone Work Phone Mobile Phone    Leesa Amaya (Spouse) [phone] -- [phone]                 History & Physical        Stoney Chavez MD at 03/10/24 0712              [facility] Hospital Medicine Services  HISTORY AND PHYSICAL    Date of Admission: 3/10/2024  Primary Care Physician: Chito Finley MD    Subjective   Primary Historian: Patient    Chief Complaint: Left facial droop, speech " difficulty    History of Present Illness  68 year old male with PMH of HTN, dyslipidemia, hypothyroidism, kidney stone, that presents to the ER with complaints of waking up with left facial droop, difficulty with speech, left arm weakness and difficulty getting up from the bed as in having no torso strength. At the time of exam his residual deficits are left facial droop and slight speech heaviness. Interestingly EKG is consistent with rate controlled Afib.     Review of Systems   Otherwise complete ROS reviewed and negative except as mentioned in the HPI.    Past Medical History:   Past Medical History:   Diagnosis Date    Basal cell carcinoma     Hyperlipidemia     Hypertension     Kidney stone     Stroke 3/10/2024     Past Surgical History:  Past Surgical History:   Procedure Laterality Date    COLONOSCOPY  10/30/2015    Normal exam repeat in 5 years    COLONOSCOPY N/A 11/22/2022    Procedure: COLONOSCOPY WITH ANESTHESIA;  Surgeon: Kelvin Mello MD;  Location: [facility] ENDOSCOPY;  Service: Gastroenterology;  Laterality: N/A;  pre: hx polyps  post: polyps  Chito Finley MD     ENDOSCOPY  10/30/2015    Fundic glad polyp stomach    ENDOSCOPY N/A 11/22/2022    Procedure: ESOPHAGOGASTRODUODENOSCOPY WITH ANESTHESIA;  Surgeon: Kelvin Mello MD;  Location: [facility] ENDOSCOPY;  Service: Gastroenterology;  Laterality: N/A;  pre: reflux  post: gastric polyps. dilation.   Chito Finley MD     GANGLION CYST EXCISION      KNEE ARTHROPLASTY      NECK SURGERY      SHOULDER SURGERY      SKIN CANCER EXCISION      UMBILICAL HERNIA REPAIR       Social History:  reports that he has never smoked. He has quit using smokeless tobacco.  His smokeless tobacco use included chew. He reports that he does not drink alcohol and does not use drugs.    Family History: family history includes Hypertension in his mother; Melanoma in his father.       Allergies:  No Known Allergies    Medications:  Prior to Admission medications   "  Medication Sig Start Date End Date Taking? Authorizing Provider   amLODIPine (NORVASC) 10 MG tablet Take 1 tablet by mouth Daily.   Yes Abhi, Nurse Juan David RN   lisinopril 10 MG tablet 20 mg, hydrochlorothiazide 25 MG tablet 12.5 mg Take  by mouth Daily. No longer taking   Yes Abhi, Nurse Juan David RN   metFORMIN (GLUCOPHAGE) 500 MG tablet Take 1 tablet by mouth 2 (Two) Times a Day With Meals.   Yes Kameron Rios MD   metoprolol succinate XL (TOPROL-XL) 100 MG 24 hr tablet Take 1 tablet by mouth Daily.   Yes Abhi, Nurse Juan David RN   oxymetazoline (AFRIN) 0.05 % nasal spray 2 sprays into the nostril(s) as directed by provider 2 (Two) Times a Day.   Yes Kameron Rios MD   pantoprazole (PROTONIX) 40 MG EC tablet Take 1 tablet by mouth Daily.   Yes Abhi, Nurse Juan David RN   simvastatin (ZOCOR) 10 MG tablet Take 1 tablet by mouth Every Night.   Yes Nurse Juan David Moe RN   betamethasone, augmented, (DIPROLENE) 0.05 % lotion Apply  topically Daily. As needed 2/15/18   Garry Hernandez MD   Ibuprofen-Diphenhydramine Cit (ADVIL PM PO) Take  by mouth.    ProviderKameron MD   loratadine (CLARITIN) 10 MG tablet Take 1 tablet by mouth Daily.    Provider, MD Kameron     I have utilized all available immediate resources to obtain, update, or review the patient's current medications (including all prescriptions, over-the-counter products, herbals, cannabis/cannabidiol products, and vitamin/mineral/dietary (nutritional) supplements).    Objective     Vital Signs: /95 (BP Location: Right arm, Patient Position: Lying)   Pulse 76   Temp 98.6 °F (37 °C) (Oral)   Resp 20   Ht 175.3 cm (69\")   Wt 109 kg (240 lb)   SpO2 97%   BMI 35.44 kg/m²   Physical Exam  Constitutional:       Appearance: He is well-developed. He is not ill-appearing, toxic-appearing or diaphoretic.   HENT:      Head: Normocephalic and atraumatic.      Right Ear: External ear normal.      Left Ear: External ear " normal.      Nose: Nose normal.      Mouth/Throat:      Mouth: Mucous membranes are dry.   Eyes:      General:         Right eye: No discharge.         Left eye: No discharge.      Extraocular Movements: Extraocular movements intact.      Conjunctiva/sclera: Conjunctivae normal.      Pupils: Pupils are equal, round, and reactive to light.   Neck:      Vascular: No JVD.   Cardiovascular:      Rate and Rhythm: Regular rhythm. Tachycardia present.      Heart sounds: Normal heart sounds. No murmur heard.  Pulmonary:      Effort: Pulmonary effort is normal. No respiratory distress.      Breath sounds: Normal breath sounds. No wheezing or rales.   Chest:      Chest wall: No tenderness.   Abdominal:      General: Bowel sounds are normal. There is no distension.      Palpations: Abdomen is soft.      Tenderness: There is no abdominal tenderness. There is no guarding or rebound.   Musculoskeletal:         General: No tenderness or deformity. Normal range of motion.      Cervical back: Normal range of motion and neck supple. No rigidity.      Right lower leg: No edema.      Left lower leg: No edema.   Skin:     General: Skin is warm and dry.      Findings: No rash.   Neurological:      Mental Status: He is alert and oriented to person, place, and time.      Cranial Nerves: Cranial nerve deficit present.      Sensory: No sensory deficit.      Motor: No abnormal muscle tone.      Deep Tendon Reflexes: Reflexes normal.      Comments: Left whole face droop   Psychiatric:         Mood and Affect: Mood normal.         Behavior: Behavior normal.     Results Reviewed:  Lab Results (last 24 hours)       Procedure Component Value Units Date/Time    POC Glucose Once [[id number]]  (Abnormal) Collected: 03/10/24 0630    Specimen: Blood Updated: 03/10/24 0641     Glucose 216 mg/dL      Comment: : [id number] Cedric LoriMeter ID: [id number]       [place] Draw [[id number]] Collected: 03/10/24 0417    Specimen: Blood Updated: 03/10/24 0530     Narrative:      The following orders were created for panel order [place] Draw.  Procedure                               Abnormality         Status                     ---------                               -----------         ------                     Green Top (Gel)[[id number]]                                  Final result               Lavender Top[[id number]]                                     Final result               Red Top[[id number]]                                          Final result               Light Blue Top[[id number]]                                   Final result                 Please view results for these tests on the individual orders.    Green Top (Gel) [[id number]] Collected: 03/10/24 0417    Specimen: Blood Updated: 03/10/24 0530     Extra Tube Hold for add-ons.     Comment: Auto resulted.       Lavender Top [[id number]] Collected: 03/10/24 0417    Specimen: Blood Updated: 03/10/24 0530     Extra Tube hold for add-on     Comment: Auto resulted       Red Top [[id number]] Collected: 03/10/24 0417    Specimen: Blood Updated: 03/10/24 0530     Extra Tube Hold for add-ons.     Comment: Auto resulted.       Light Blue Top [[id number]] Collected: 03/10/24 0417    Specimen: Blood Updated: 03/10/24 0530     Extra Tube Hold for add-ons.     Comment: Auto resulted       Basic Metabolic Panel [[id number]]  (Abnormal) Collected: 03/10/24 0417    Specimen: Blood Updated: 03/10/24 0451     Glucose 286 mg/dL      BUN 16 mg/dL      Creatinine 0.93 mg/dL      Sodium 137 mmol/L      Potassium 4.4 mmol/L      Comment: Specimen hemolyzed.  Result may be falsely elevated.        Chloride 101 mmol/L      CO2 24.0 mmol/L      Calcium 9.2 mg/dL      BUN/Creatinine Ratio 17.2     Anion Gap 12.0 mmol/L      eGFR 88.9 mL/min/1.73     Narrative:      GFR Normal >60  Chronic Kidney Disease <60  Kidney Failure <15      Magnesium [[id number]]  (Normal) Collected: 03/10/24 0417    Specimen: Blood Updated: 03/10/24 0451      Magnesium 2.0 mg/dL     Single High Sensitivity Troponin T [[id number]]  (Normal) Collected: 03/10/24 0417    Specimen: Blood Updated: 03/10/24 0451     HS Troponin T 10 ng/L      Comment: Specimen hemolyzed.  Results may be falsely decreased.       Narrative:      High Sensitive Troponin T Reference Range:  <14.0 ng/L- Negative Female for AMI  <22.0 ng/L- Negative Male for AMI  >=14 - Abnormal Female indicating possible myocardial injury.  >=22 - Abnormal Male indicating possible myocardial injury.   Clinicians would have to utilize clinical acumen, EKG, Troponin, and serial changes to determine if it is an Acute Myocardial Infarction or myocardial injury due to an underlying chronic condition.         BNP [[id number]]  (Abnormal) Collected: 03/10/24 0417    Specimen: Blood Updated: 03/10/24 0447     proBNP 1,665.0 pg/mL     Narrative:      This assay is used as an aid in the diagnosis of individuals suspected of having heart failure. It can be used as an aid in the diagnosis of acute decompensated heart failure (ADHF) in patients presenting with signs and symptoms of ADHF to the emergency department (ED). In addition, NT-proBNP of <300 pg/mL indicates ADHF is not likely.    Age Range Result Interpretation  NT-proBNP Concentration (pg/mL:      <50             Positive            >450                   Gray                 300-450                    Negative             <300    50-75           Positive            >900                  Gray                300-900                  Negative            <300      >75             Positive            >1800                  Gray                300-1800                  Negative            <300    CBC & Differential [[id number]]  (Abnormal) Collected: 03/10/24 0417    Specimen: Blood Updated: 03/10/24 0432    Narrative:      The following orders were created for panel order CBC & Differential.  Procedure                               Abnormality         Status                      ---------                               -----------         ------                     CBC Auto Differential[[id number]]        Abnormal            Final result                 Please view results for these tests on the individual orders.    CBC Auto Differential [[id number]]  (Abnormal) Collected: 03/10/24 0417    Specimen: Blood Updated: 03/10/24 0432     WBC 6.54 10*3/mm3      RBC 4.98 10*6/mm3      Hemoglobin 15.5 g/dL      Hematocrit 43.1 %      MCV 86.5 fL      MCH 31.1 pg      MCHC 36.0 g/dL      RDW 11.6 %      RDW-SD 36.4 fl      MPV 10.4 fL      Platelets 160 10*3/mm3      Neutrophil % 62.2 %      Lymphocyte % 25.2 %      Monocyte % 9.9 %      Eosinophil % 1.7 %      Basophil % 0.5 %      Immature Grans % 0.5 %      Neutrophils, Absolute 4.07 10*3/mm3      Lymphocytes, Absolute 1.65 10*3/mm3      Monocytes, Absolute 0.65 10*3/mm3      Eosinophils, Absolute 0.11 10*3/mm3      Basophils, Absolute 0.03 10*3/mm3      Immature Grans, Absolute 0.03 10*3/mm3      nRBC 0.0 /100 WBC     POC Glucose Once [[id number]]  (Abnormal) Collected: 03/10/24 0413    Specimen: Blood Updated: 03/10/24 0425     Glucose 257 mg/dL      Comment: : [id number] Cade RebaMeter ID: [id number]             Imaging Results (Last 24 Hours)       Procedure Component Value Units Date/Time    CT Head Without Contrast [[id number]] Resulted: 03/10/24 0506     Updated: 03/10/24 0509          I have personally reviewed and interpreted the radiology studies and ECG obtained at time of admission.     Assessment / Plan   Assessment:   Active Hospital Problems    Diagnosis     **Stroke     HTN (hypertension), benign     Gastroesophageal reflux disease with esophagitis     Controlled type 2 diabetes mellitus without complication, without long-term current use of insulin      Treatment Plan  The patient will be admitted to my service here at [facility].   Vitals and neuro checks every 4 hours  PT/OT/SLP evaluation  NPO until SLP  "evaluation  IVF KVO prn  Lipid panel, A1C check  Neurology consult and work up  MRI brain/CTA brain and neck  Echocardiogram 2D    Afib rate control > Start Eliquis    DM \"borderline\" morning blood sugar 286 concerning for DM 2  Accu check q6h  Humalog low dose sliding scale  Hypoglycemia protocol    DVT prophylaxis > Patient anticoagulated    Medical Decision Making  Number and Complexity of problems: 3 complex medical problems  Differential Diagnosis: see above    Conditions and Status        Condition is unchanged.     [facility] Data  External documents reviewed: Differential EHR  Cardiac tracing (EKG, telemetry) interpretation: Afib ventricular rate 80 bpm  Radiology interpretation: See above  Labs reviewed: See above  Any tests that were considered but not ordered: none     Decision rules/scores evaluated (example [id number], Wells, etc): [id number] 4-5     Discussed with: Patient and wife at bedside     Care Planning  Shared decision making: Patient  Code status and discussions: Full code    Disposition  Social Determinants of Health that impact treatment or disposition: none  Estimated length of stay is to be determined, likely over 2 midnights.     I confirmed that the patient's advanced care plan is present, code status is documented, and a surrogate decision maker is listed in the patient's medical record.     The patient's surrogate decision maker is Leesa Amaya, spouse.     The patient was seen and examined by me on 3/10/2024 at 0655 AM.    Electronically signed by Stoney Chavez MD, 03/10/24, 07:55 CDT.              Electronically signed by Stoney Chavez MD at 03/10/24 0809       Vital Signs (last day)       Date/Time Temp Temp src Pulse Resp BP Patient Position SpO2    03/11/24 0749 97.7 (36.5) Oral 89 18 159/98 Lying 95    03/11/24 0354 98.5 (36.9) Oral 82 18 154/92 Lying 95    03/10/24 1947 98.3 (36.8) Oral 74 18 132/84 Lying 96    03/10/24 1544 97.6 (36.4) Oral 75 18 151/81 Lying 96    " 03/10/24 1358 -- -- -- -- 134/82 -- --    03/10/24 1121 97.6 (36.4) Oral 82 18 134/82 Lying 94    03/10/24 0634 98.6 (37) Oral 76 20 145/95 Lying 97    03/10/24 0602 -- -- 69 -- 135/91 -- 93    03/10/24 0501 -- -- 64 -- 145/95 -- --    03/10/24 0500 -- -- -- -- -- -- 92    03/10/24 0419 -- -- 74 -- 132/103 -- 94    03/10/24 0415 97.6 (36.4) -- -- -- -- -- --    03/10/24 0413 -- -- 82 15 158/105 -- 94          Current Facility-Administered Medications   Medication Dose Route Frequency Provider Last Rate Last Admin    acetaminophen (TYLENOL) tablet 650 mg  650 mg Oral Q4H PRN Stoney Chavez MD        Or    acetaminophen (TYLENOL) suppository 650 mg  650 mg Rectal Q4H PRN Stoney Chavez MD        amLODIPine (NORVASC) tablet 10 mg  10 mg Oral Daily Nino Rousseau MD   10 mg at 03/11/24 0821    apixaban (ELIQUIS) tablet 5 mg  5 mg Oral Q12H Nino Rousseau MD   5 mg at 03/11/24 0821    atorvastatin (LIPITOR) tablet 80 mg  80 mg Oral Nightly Stoney Chavez MD   80 mg at 03/10/24 2042    dextrose (D50W) (25 g/50 mL) IV injection 25 g  25 g Intravenous Q15 Min PRN Stoney Chavez MD        dextrose (GLUTOSE) oral gel 15 g  15 g Oral Q15 Min PRN Stoney Chavez MD        glucagon (GLUCAGEN) injection 1 mg  1 mg Intramuscular Q15 Min PRN Stoney Chavez MD        losartan (COZAAR) tablet 100 mg  100 mg Oral Q24H Nino Rousseau MD   100 mg at 03/11/24 0821    And    hydroCHLOROthiazide tablet 25 mg  25 mg Oral Q24H Nino Rousseau MD   25 mg at 03/11/24 0821    insulin detemir (LEVEMIR) injection 10 Units  10 Units Subcutaneous Daily Nino Rousseau MD   10 Units at 03/11/24 0821    insulin regular (humuLIN R,novoLIN R) injection 2-7 Units  2-7 Units Subcutaneous TID AC Nino Rousseau MD   3 Units at 03/11/24 0821    labetalol (NORMODYNE,TRANDATE) injection 10 mg  10 mg Intravenous Q4H PRN Stoney Chavez MD        levothyroxine (SYNTHROID,  LEVOTHROID) tablet 25 mcg  25 mcg Oral Q AM Nino Rousseau MD   25 mcg at 03/11/24 0821    metoprolol succinate XL (TOPROL-XL) 24 hr tablet 100 mg  100 mg Oral Daily Stoney Chavez MD   100 mg at 03/11/24 0821    pantoprazole (PROTONIX) EC tablet 40 mg  40 mg Oral Daily Stoney Chavez MD   40 mg at 03/11/24 0821    sodium chloride 0.9 % flush 10 mL  10 mL Intravenous Q12H Stoney Chavez MD   10 mL at 03/11/24 0822    sodium chloride 0.9 % flush 10 mL  10 mL Intravenous PRN Stoney Chavez MD        sodium chloride 0.9 % infusion 40 mL  40 mL Intravenous PRN Stoney Chavez MD            Physician Progress Notes (last 24 hours)        Nino Rousseau MD at 03/10/24 1113              [facility] Medicine Services  INPATIENT PROGRESS NOTE    Patient Name: Rikki Amaya  Date of Admission: 3/10/2024  Today's Date: 03/10/24  Length of Stay: 0  Primary Care Physician: Chito Finley MD    Subjective   Chief Complaint: Facial droop, left upper extremity weakness  HPI   Patient admitted last night with left facial droop and speech difficulty, noted to have left upper extremity weakness.  He was found to be on atrial fibrillation and this is a new diagnosis for him.  He was evaluated earlier today by night physician.  Per family, symptoms started approximately 3 AM today.  Patient reports history of hypertension and hyperlipidemia, but no diagnosed diabetes.  His hemoglobin A1c was found to be above 8 today.  Electrocardiogram showed atrial fibrillation, controlled rate, with no palpitations reported.  No chest pain.      Review of Systems   All pertinent negatives and positives are as above. All other systems have been reviewed and are negative unless otherwise stated.     Objective    Temp:  [97.6 °F (36.4 °C)-98.6 °F (37 °C)] 98.6 °F (37 °C)  Heart Rate:  [64-82] 76  Resp:  [15-20] 20  BP: (132-158)/() 145/95  Physical  Exam  Constitutional:       Appearance: Normal appearance.  Found lying in bed.  Family members present.  HENT:      Head: Normocephalic and atraumatic.      Nose: Nose normal.      Mouth/Throat:      Mouth: Mucous membranes are moist.      Pharynx: Oropharynx is clear.   Eyes:      Extraocular Movements: Extraocular movements intact.      Conjunctiva/sclera: Conjunctivae normal.      Pupils: Pupils are equal, round, and reactive to light.   Cardiovascular:      Rate and Rhythm: irregular rhythm.      Pulses: Normal pulses.   Pulmonary:      Effort: No respiratory distress.      Breath sounds: Normal breath sounds. No wheezing, rhonchi or rales.   Abdominal:      General: Abdomen is obese.  Bowel sounds are normal.      Palpations: Abdomen is soft.      Tenderness: There is no guarding or rebound.   Musculoskeletal:         General: Normal range of motion.      Cervical back: Normal range of motion and neck supple.   Extremities:  No lower extremity edema.  Skin:     Capillary Refill: Capillary refill takes less than 2 seconds.      Coloration: Skin is not jaundiced.      Findings: No rash.   Neurological:      There is weakness of the left upper extremity 4 out of 5; strength of the right upper and bilateral lower extremities is 5 out of 5.  Gait was not evaluated.  Memory slightly impaired.  Alert oriented to place time and person.  Left facial weakness.    Psychiatric:         Mood and Affect: Mood normal.         Behavior: Behavior normal.           Results Review:  I have reviewed the labs, radiology results, and diagnostic studies.    Laboratory Data:   Results from last 7 days   Lab Units 03/10/24  0417   WBC 10*3/mm3 6.54   HEMOGLOBIN g/dL 15.5   HEMATOCRIT % 43.1   PLATELETS 10*3/mm3 160        Results from last 7 days   Lab Units 03/10/24  0417   SODIUM mmol/L 137   POTASSIUM mmol/L 4.4   CHLORIDE mmol/L 101   CO2 mmol/L 24.0   BUN mg/dL 16   CREATININE mg/dL 0.93   CALCIUM mg/dL 9.2   GLUCOSE mg/dL 286*  "      Culture Data:   No results found for: \"BLOODCX\", \"URINECX\", \"WOUNDCX\", \"MRSACX\", \"RESPCX\", \"STOOLCX\"    Radiology Data:   Imaging Results (Last 24 Hours)       Procedure Component Value Units Date/Time    CT Head Without Contrast [[id number]] Collected: 03/10/24 0758     Updated: 03/10/24 0803    Narrative:      EXAMINATION:  CT HEAD WO CONTRAST-  3/10/2024 4:05 AM     HISTORY: Stroke work-up. Left-sided facial drooping. Slurred speech.     TECHNIQUE: Multiple axial images were obtained through the brain without  contrast infusion. Multiplanar images were reconstructed.     DLP: 727.98 mGy.cm Automated dosage reduction technique was utilized to  reduce patient dosage.     COMPARISON: No comparison study.     FINDINGS: There are no hemorrhage, edema or mass effect. There is  minimal atrophy and mild prominence of the lateral ventricles. The  visualized paranasal sinuses are clear. The mastoid air cells are clear.  No calvarial fracture is seen.          Impression:      1. No hemorrhage, edema or mass effect.  2. Minimal atrophy with associated prominence of the lateral ventricles.        This report was signed and finalized on 3/10/2024 8:00 AM by Dr. Joey Lisa MD.               I have reviewed the patient's current medications.     Assessment/Plan   Assessment  Active Hospital Problems    Diagnosis     **Stroke     HTN (hypertension), benign     Gastroesophageal reflux disease with esophagitis     Controlled type 2 diabetes mellitus without complication, without long-term current use of insulin        Acute ischemic cerebrovascular accident highly suspected  Newly diagnosed atrial fibrillation  Hyperlipidemia  Hypertension  Obesity, BMI 35  Diabetes mellitus type 2, new diagnosis      Treatment Plan  MRI of the brain without contrast pending.  CT angiogram head pending.  Once bleeding has been ruled out, will start patient on full anticoagulation.  Neurology evaluation  Telemetry monitoring.    Start " basal insulin, 10 units at bedtime.  Follow glucose control.  Given recent contrast administration, will hold metformin, but may restart on discharge.    Blood pressure management.  May restart amlodipine tomorrow.    Continue atorvastatin, 80 mg daily.  Continue Toprol- mg p.o. daily    Follow echocardiogram.    Secondary prevention.      Patient and family members informed of plan.  Follow physical therapy, occupational therapy and speech therapy recommendations.  Consider acute rehabilitation.    Medical Decision Making  Number and Complexity of problems: 6, moderate to high complexity  Differential Diagnosis: See above    Conditions and Status        Condition is unchanged.     [facility] Data  External documents reviewed: None  Cardiac tracing (EKG, telemetry) interpretation: Atrial fibrillation, controlled rate  Radiology interpretation: Radiology reports reviewed  Labs reviewed: Yes  Any tests that were considered but not ordered: No     Decision rules/scores evaluated (example [id number], Wells, etc): See chart     Discussed with: Patient and family     Care Planning  Shared decision making: With patient  Code status and discussions: Full code    Disposition  Social Determinants of Health that impact treatment or disposition: Acute ischemic stroke.    Electronically signed by Nino Rousseau MD, 03/10/24, 11:14 CDT.     Electronically signed by Nino Rousseau MD at 03/10/24 7539

## 2024-03-11 NOTE — DISCHARGE SUMMARY
[facility]  DISCHARGE SUMMARY     Date of Admission: 3/10/2024  Date of Discharge:  3/11/2024  Primary Care Physician: Chito Finley MD    Presenting Problem/History of Present Illness:  Left facial droop, speech difficulty    Final Discharge Diagnoses:  Active Hospital Problems    Diagnosis     **Acute right frontal stroke     Atrial fibrillation, new finding     Mixed hyperlipidemia     HTN (hypertension), benign     Gastroesophageal reflux disease with esophagitis     Controlled type 2 diabetes mellitus without complication, without long-term current use of insulin        Consults: , neurology    Procedures Performed: None    Pertinent Test Results:   Results for orders placed during the hospital encounter of 03/10/24    Adult Transthoracic Echo Complete W/ Cont if Necessary Per Protocol (With Agitated Saline)    Interpretation Summary    Left ventricular systolic function is normal. Left ventricular ejection fraction appears to be 61 - 65%.    Left ventricular wall thickness is consistent with mild concentric hypertrophy    Left ventricular diastolic function was normal.    Normal right ventricular cavity size and systolic function noted.    Saline test results are negative for right to left atrial level shunt.    There is mild calcification of the aortic valve. There is mild to moderate thickening of the aortic valve. No aortic valve regurgitation is present. No hemodynamically significant aortic valve stenosis is present.      Imaging Results (All)       Procedure Component Value Units Date/Time    CT Angiogram Carotids [[id number]] Collected: 03/10/24 1506     Updated: 03/10/24 1513    Narrative:      EXAMINATION:  CT ANGIOGRAM CAROTIDS-  3/10/2024 11:17 AM     HISTORY: Stroke, follow up; R29.810-Facial weakness; R29.90-Unspecified  symptoms and signs involving the nervous system; I48.91-Unspecified  atrial fibrillation; Z74.09-Other  reduced mobility; R13.12-Dysphagia,  oropharyngeal phase.     COMPARISON : No comparison study.     DLP: 380.93 mGy.cm Automated dosage reduction technique was utilized to  reduce patient dosage.     TECHNIQUE: CT angio was performed of the neck with IV contrast. Coronal,  sagittal and 3-D reconstruction were performed.     INDEPENDENT 3-D WORKSTATION UTILIZED FOR RECONSTRUCTION: No.     VERTEBRAL ARTERIES: The right vertebral artery is very small in caliber.  It is partially obscured in the mid to lower neck due to severe streak  artifact related to cervical spine hardware. The left vertebral artery  is also partially obscured. The left vertebral artery is dominant. The  left vertebral artery originates from the aortic arch. The origin of the  right vertebral artery has a normal appearance.     RIGHT CAROTID ARTERY: There may be very minimal soft plaque in the right  carotid bifurcation. The right carotid vessels in the neck are uniformly  slightly smaller than the left side which is likely developmental. No  flow-limiting right carotid stenosis is seen.     LEFT CAROTID ARTERY: There is no left carotid plaque or stenosis.     OTHER FINDINGS: The parotid and submandibular glands are symmetric. The  thyroid gland is unremarkable. There is no mass identified in the neck.  There are no enlarged lymph nodes. The lung apices are clear.          Impression:      1. NASCET criteria utilized.  2. The vertebral arteries are patent in the neck bilaterally without  plaque or stenosis. The left vertebral artery is dominant and originates  from the aortic arch. A portion of both vertebral arteries are obscured  due to streak artifact in the neck related to hardware.  3. There appears to be mild noncalcified plaque in the right carotid  bifurcation that does not cause any luminal narrowing. The right carotid  vessels are all slightly smaller in size compared to the left carotid  vessels which I think is a developmental  variant.     This report was signed and finalized on 3/10/2024 3:10 PM by Dr. Joey Lisa MD.       CT Angiogram Head w AI Analysis of LVO [[id number]] Collected: 03/10/24 1456     Updated: 03/10/24 1508    Narrative:      EXAMINATION:  CT ANGIOGRAM HEAD W AI ANALYSIS OF LVO-  3/10/2024 11:17  AM     HISTORY: Stroke, follow up; R29.810-Facial weakness; R29.90-Unspecified  symptoms and signs involving the nervous system; I48.91-Unspecified  atrial fibrillation; Z74.09-Other reduced mobility; R13.12-Dysphagia,  oropharyngeal phase.     COMPARISON : No comparison study.     DLP: 380.93 mGy.cm Automated dosage reduction technique was utilized.     TECHNIQUE: CT angio was performed of the brain with IV contrast.  Coronal, sagittal and 3-D images were reconstructed.     INDEPENDENT 3-D WORKSTATION UTILIZED FOR RECONSTRUCTION: YES. A  RADIOLOGIST WAS NOT PRESENT IN THE DEPARTMENT.     FINDINGS: The vertebral and basilar arteries are patent. The right  vertebral artery is small. The internal carotid arteries are patent. The  anterior, middle and posterior cerebral arteries are patent. No  aneurysms are appreciated. The visualized venous sinuses are patent.     AI ANALYSIS: AI analysis demonstrates relative decreased density of  right middle cerebral artery branches. This is reduced to 60-75% of the  density on the left side. I do not see a large vessel occlusion as a  cause. The right internal carotid artery is slightly smaller in caliber  compared to the left internal carotid artery.          Impression:      1. No large vessel occlusion is seen. No significant carotid artery  plaque is seen intracranially.  2. AI analysis demonstrates relative decreased density of right middle  cerebral artery branches reduced to 60-75% compared to the vessels on  the left. There is relative decreased size of the right internal carotid  artery compared to the left which may account for this finding.     This report was signed and  finalized on 3/10/2024 3:05 PM by Dr. Joey Lisa MD.       CT CEREBRAL PERFUSION WITH & WITHOUT CONTRAST [[id number]] Collected: 03/10/24 1318     Updated: 03/10/24 1327    Narrative:      EXAMINATION:  CT CEREBRAL PERFUSION W WO CONTRAST-  3/10/2024 11:17 AM     HISTORY: Stroke, follow up.     TECHNIQUE:     1. Perfusion CT is performed to acquire images tracking the temporal  course IV of iodinated contrast material passing through the cerebral  circulation. Perfusion parameters, such as cerebral blood flow (CBF),  cerebral blood volume (CBV), mean transit time (MTT), etc. are  calculated by [employer] with additional provided perfusion maps and  estimated stroke volumes.  2. Automated exposure control (AEC) protocols are utilized on the  scanner to ensure dose lowered technique.     DLP: 1553.05 mGy.cm     COMPARISON without contrast: Noncontrast CT brain and brain angiogram  dated 3/10/2024 11:17 AM     FINDINGS:     There is abnormal perfusion involving the right caudate nucleus and body  in right putamen. Perfusion abnormality demonstrated in the right  cerebellum is likely an artifact as there is no abnormality on MRI in  this area.     Distribution: Perfusion abnormality involves the Right anterior cerebral  artery vascular distribution. The lateral aspect of the putamen is also  supplied by right MCA vessels.     Tmax >6.0 seconds volume: 9 ml     CBF < 30% volume: 0 ml     Mismatch volume: 9 ml     Mismatch ratio: Infinite.       Impression:      Impression:  1. Acute perfusion abnormality involving the right caudate nucleus and  body and right putamen, as described.  2. Perfusion abnormality depicted over the right cerebellum is likely  artifactual as there is no abnormality identified in the right  cerebellum on the MRI.     This report was signed and finalized on 3/10/2024 1:23 PM by Dr. Joey Lisa MD.       MRI Brain Without Contrast [[id number]] Collected: 03/10/24 1305     Updated: 03/10/24 1315     Narrative:      EXAMINATION:  MRI BRAIN WO CONTRAST-  3/10/2024 10:32 AM     HISTORY: Stroke, follow up; R29.810-Facial weakness; R29.90-Unspecified  symptoms and signs involving the nervous system; I48.91-Unspecified  atrial fibrillation; Z74.09-Other reduced mobility; R13.12-Dysphagia,  oropharyngeal phase     TECHNIQUE: Multiplanar imaging was performed in a high field magnet.     COMPARISON: No comparison study.     FINDINGS: There is an acute infarct involving the right caudate nucleus  and the body and right putamen. There is diffusion restriction on the  ADC map images. There is only limited edema in this area on T2 images.  There is no mass effect or midline shift. The ventricular system is  nondilated. There are a few scattered areas of T2 high signal within the  hemispheric white matter. No mass lesions are appreciated. There is  minimal atrophy. Mild prominence of the lateral ventricles is likely  related.          Impression:      1. Acute infarct involving the right caudate nucleus and body and right  putamen.  2. Minimal chronic ischemic small vessel white matter disease.  3. Mild atrophy with associated prominence of the lateral ventricles.           This report was signed and finalized on 3/10/2024 1:16 PM by Dr. Joey Lisa MD.       CT Head Without Contrast [[id number]] Collected: 03/10/24 0758     Updated: 03/10/24 0803    Narrative:      EXAMINATION:  CT HEAD WO CONTRAST-  3/10/2024 4:05 AM     HISTORY: Stroke work-up. Left-sided facial drooping. Slurred speech.     TECHNIQUE: Multiple axial images were obtained through the brain without  contrast infusion. Multiplanar images were reconstructed.     DLP: 727.98 mGy.cm Automated dosage reduction technique was utilized to  reduce patient dosage.     COMPARISON: No comparison study.     FINDINGS: There are no hemorrhage, edema or mass effect. There is  minimal atrophy and mild prominence of the lateral ventricles. The  visualized paranasal  sinuses are clear. The mastoid air cells are clear.  No calvarial fracture is seen.          Impression:      1. No hemorrhage, edema or mass effect.  2. Minimal atrophy with associated prominence of the lateral ventricles.        This report was signed and finalized on 3/10/2024 8:00 AM by Dr. Joey Lisa MD.             LAB RESULTS:      Lab 03/11/24 0444 03/10/24  0417   WBC 8.30 6.54   HEMOGLOBIN 15.9 15.5   HEMATOCRIT 45.8 43.1   PLATELETS 172 160   NEUTROS ABS  --  4.07   IMMATURE GRANS (ABS)  --  0.03   LYMPHS ABS  --  1.65   MONOS ABS  --  0.65   EOS ABS  --  0.11   MCV 88.9 86.5         Lab 03/11/24 0444 03/10/24  0417   SODIUM 141 137   POTASSIUM 3.5 4.4   CHLORIDE 102 101   CO2 27.0 24.0   ANION GAP 12.0 12.0   BUN 14 16   CREATININE 0.93 0.93   EGFR 88.9 88.9   GLUCOSE 201* 286*   CALCIUM 8.7 9.2   MAGNESIUM  --  2.0   HEMOGLOBIN A1C  --  8.70*         Lab 03/11/24 0444   TOTAL PROTEIN 6.9   ALBUMIN 4.1   GLOBULIN 2.8   ALT (SGPT) 28   AST (SGOT) 17   BILIRUBIN 0.6   ALK PHOS 60         Lab 03/10/24  0417   PROBNP 1,665.0*   HSTROP T 10         Lab 03/10/24  0417   CHOLESTEROL 165   LDL CHOL 107*  111*   HDL CHOL 31*   TRIGLYCERIDES 148             Brief Urine Lab Results       None          Microbiology Results (last 10 days)       ** No results found for the last 240 hours. **            Hospital Course: Mr. Amaya is a 68-year-old male with past medical history of hypertension, dyslipidemia, hypothyroidism, diabetes type 2.  He presented to [facility] emergency room 3/10/2024 with complaints of left facial droop, difficulty with speech, left arm weakness and difficulty getting from the bed on the morning of admission.  Patient noticed symptoms around 4 AM when he woke up.  He denied any pain, chest pain, palpitations.  At this time to ER symptoms improved.  Mild left facial weakness noted.  Mild left sided weakness.  tPA not considered due to timing of symptoms onset as well as  symptoms improving.  CT scan of the head showed no hemorrhage, edema or mass effect.  EKG showed atrial fibrillation 80 bpm.  Patient denied any previous history of known atrial fibrillation.    He was admitted to the neurology floor with acute stroke, hypertension, new finding of rate controlled atrial fibrillation.  Neurochecks ordered every 4 hours.  Patient made n.p.o. until evaluated by speech therapy.  Statin, Eliquis started.  MRI of the brain showed acute infarct involving the right caudate nucleus and body and right putamen.  Minimal chronic ischemic small vessel white matter disease.  Mild atrophy and associated prominence of the lateral ventricles.  CT angiogram head neck noted vertebral arteries patent bilaterally without plaque or stenosis.  Left vertebral artery dominant and originates from the aortic arch.  Portion of both vertebral arteries are obscured due to streaking artifact in the neck related to hardware.  Noncalcified plaque in the right carotid bifurcation that does not cause any luminal narrowing.  Right carotid vessels slightly smaller in size compared to left carotid vessel.  No large vessel occlusion seen.  No significant carotid artery plaque.  Decreased density right middle cerebral artery branch reduced to 60-75% compared to vessels on the left.  Echocardiogram EF 61-65%.  Left ventricular wall thickness consistent with mild concentric hypertrophy.  Diastolic function normal.  Right ventricular cavity size and function normal.  Saline test negative for right to left shunt.  Mild calcification aortic valve.  Mild to moderate thickening aortic valve.  No aortic valve regurgitation present.  No significant aortic valve stenosis.    Neurology consulted and he was seen by  who noted new findings left-sided weakness and atrial fibrillation.  No tPA administered due to time.  MRI of the brain showed acute right frontal stroke.  Patient was cleared to begin anticoagulation with  Eliquis for new finding atrial fibrillation and stroke.  Okay to normalize blood pressure with systolic goal less than 140.    Physical therapy, Occupational Therapy, speech therapy consulted and recommended outpatient therapy.  Patient ambulated 200 feet sidestepping, backward and stepping over a box of globes.  Patient transferred to chair with standby assistance.  No acute needs identified other than outpatient PT OT and ST.    Hemoglobin A1c 8.7.  Patient takes metformin 1000 mg twice daily.  Metformin held on admission.  Accu-Cheks with sliding scale insulin coverage ordered.  Glucoses greater than 200.  Patient was placed on Levemir 10 units nightly.  Diabetic educator consulted.  Discussed insulin with patient and he is not interested in insulin therapy at this time.  Patient and wife will further discuss with primary care provider with follow-up appointment on 3/13/2024.  Discussed diet modification as well as regular exercise program.  Metformin held for 48 hours post contrast.  May resume metformin on 3/12/2024.     with goal less than 70.  Cholesterol 165, glycerides 148.  Atorvastatin 80 mg orally nightly started.  Simvastatin discontinued.  Medication changes discussed with patient and wife.    Patient with new finding of atrial fibrillation rate controlled on admission.  [id number] score 5.  Patient and wife deny any previous knowledge of atrial fibrillation.  Patient was on metoprolol XL which was likely controlling his rate.  Discussed anticoagulation with Eliquis and patient and wife agreeable.  Will arrange outpatient follow-up with cardiology for new finding of atrial fibrillation in the setting of acute stroke.    Amlodipine, metoprolol XL, olmesartan/HCTZ continued for hypertension.  Blood pressure 132/84, 154/92.    On 3/11/2024, he has been cleared for discharge by neurology and physical therapy.  Patient ambulated 200 feet independently without loss of balance.  Patient will continue  "outpatient physical therapy, Occupational Therapy, speech therapy.  Eliquis 5 mg orally twice daily started for stroke and new finding of rate controlled atrial fibrillation.  Atorvastatin 80 mg orally nightly.  Will arrange outpatient follow-up with cardiology for new finding of atrial fibrillation in the setting of stroke.  Follow-up with neurology 1 month, 4/11/24 and follow-up with Dr. Finley 3/13/2024.    Physical Exam on Discharge:  /98 (BP Location: Right arm, Patient Position: Lying)   Pulse 89   Temp 97.7 °F (36.5 °C) (Oral)   Resp 18   Ht 175.3 cm (69\")   Wt 109 kg (240 lb)   SpO2 95%   BMI 35.44 kg/m²   Physical Exam  Vitals and nursing note reviewed.   Constitutional:       Appearance: He is obese.      Comments: Sitting up in chair.  No oxygen use.  No visitors in room.   HENT:      Head: Normocephalic and atraumatic.      Nose: No congestion.      Mouth/Throat:      Pharynx: Oropharynx is clear. No oropharyngeal exudate or posterior oropharyngeal erythema.   Eyes:      Extraocular Movements: Extraocular movements intact.      Pupils: Pupils are equal, round, and reactive to light.   Cardiovascular:      Rate and Rhythm: Normal rate. Rhythm irregular.      Heart sounds: No murmur heard.     Comments: Atrial fibrillation 77 on telemetry  Pulmonary:      Breath sounds: No wheezing, rhonchi or rales.      Comments: No oxygen use.  Abdominal:      Palpations: Abdomen is soft.      Tenderness: There is no abdominal tenderness.   Genitourinary:     Comments: Voiding.  Musculoskeletal:         General: No swelling or tenderness.      Cervical back: Normal range of motion and neck supple.   Skin:     General: Skin is warm and dry.   Neurological:      General: No focal deficit present.      Mental Status: He is alert and oriented to person, place, and time.      Comments: Sitting up in chair.  No word finding issues.  Moves extremities equally.  Follows commands.  Finger-to-nose bilaterally intact. "  Knee to shin intact bilaterally.  Slight weakness left upper extremity   Psychiatric:         Mood and Affect: Mood normal.         Behavior: Behavior normal.         Thought Content: Thought content normal.         Judgment: Judgment normal.       Condition on Discharge: None    Discharge Disposition:  Home or Self Care    Discharge Medications:     Discharge Medications        New Medications        Instructions Start Date   apixaban 5 MG tablet tablet  Commonly known as: ELIQUIS   5 mg, Oral, Every 12 Hours Scheduled      atorvastatin 80 MG tablet  Commonly known as: LIPITOR  Notes to patient: Next dose due tonight 3-11   80 mg, Oral, Nightly             Continue These Medications        Instructions Start Date   ADVIL PM PO   Oral      amLODIPine 5 MG tablet  Commonly known as: NORVASC  Notes to patient: Next dose due in am 3-12   5 mg, Oral, Daily      levothyroxine 25 MCG tablet  Commonly known as: SYNTHROID, LEVOTHROID  Notes to patient: Next dose due in am 3-12   25 mcg, Oral, Daily      loratadine 10 MG tablet  Commonly known as: CLARITIN  Notes to patient: Next dose due in am 3-12   10 mg, Oral, Daily      metFORMIN 500 MG tablet  Commonly known as: GLUCOPHAGE  Notes to patient: Next dose due 3-12 pm   1,000 mg, Oral, 2 Times Daily With Meals.  Hold for 48 hours post contrast.  May resume 3/12/2024 PM dose      metoprolol succinate  MG 24 hr tablet  Commonly known as: TOPROL-XL  Notes to patient: Next dose due in am 3-12   100 mg, Oral, Daily      olmesartan-hydrochlorothiazide 40-25 MG per tablet  Commonly known as: BENICAR HCT  Notes to patient: Next dose due in am 3-12   1 tablet, Oral, Daily      oxymetazoline 0.05 % nasal spray  Commonly known as: AFRIN  Notes to patient: Next dose due tonight 3-11   2 sprays, Nasal, 2 Times Daily      pantoprazole 40 MG EC tablet  Commonly known as: PROTONIX  Notes to patient: Next dose due in am 3-12   40 mg, Oral, Daily             Stop These Medications       meloxicam 15 MG tablet  Commonly known as: MOBIC     simvastatin 10 MG tablet  Commonly known as: ZOCOR              Discharge Diet:   Diet Instructions       Diet: Diabetic Diets; Consistent Carbohydrate; Regular (IDDSI 7); Thin (IDDSI 0)      Discharge Diet: Diabetic Diets    Diabetic Diet: Consistent Carbohydrate    Texture: Regular (IDDSI 7)    Fluid Consistency: Thin (IDDSI 0)            Activity at Discharge:   Activity Instructions       Activity as Tolerated              Discharge instructions:  1.  For facial drooping, word finding issues, left-sided weakness seek medical attention.  2.  Eliquis 5 mg orally twice daily for new finding atrial fibrillation and stroke  3.  Atorvastatin 80 mg orally nightly  4.  Discontinue simvastatin  5.  Outpatient physical therapy, Occupational Therapy, speech therapy  6.  Follow-up with Dr. Finley on 3/13/2024  7.  Follow-up with neurology 4 weeks, 4/11/2024  8.  Outpatient referral to cardiology for new finding atrial fibrillation, rate controlled  9.  Hold metformin for 48 hours post contrast and may resume on 3/12/2024 PM dose    Follow-up Appointments:   Follow-up with Dr. Finley on 3/13/2024  Follow-up with neurology 4 weeks  Outpatient referral to cardiology for new finding atrial fibrillation, rate controlled      Test Results Pending at Discharge: None    Electronically signed by YANNI Darnell, 03/11/24, 09:52 CDT.    Time: 35 minutes.  Discussed with Dr. Rousseau, Dr. Moreira, neurology, patient, discussed with wife per her cell phone.    The above documentation resulted from a face-to-face encounter by me Yarelis LIAO, [facility].

## 2024-03-11 NOTE — PLAN OF CARE
Problem: Adult Inpatient Plan of Care  Goal: Plan of Care Review  Outcome: Ongoing, Not Progressing  Flowsheets (Taken 3/11/2024 0379)  Progress: no change  Plan of Care Reviewed With: patient  Outcome Evaluation: Pt A&Ox4, VSS. No c/o pain. NIH 2 for LUE weakness and L sided facial droop. Tolerating diet with thickened liquids. Pt voiding, incontinent at times. Room air, CPOX. SCDs. A fib on telemetry. Safety maintained, will continue to monitor.

## 2024-03-11 NOTE — THERAPY DISCHARGE NOTE
Acute Care - Physical Therapy Discharge Summary  Psychiatric       Patient Name: Rikki Amaya  : 1955  MRN: [id number]    Today's Date: 3/11/2024                 Admit Date: 3/10/2024      PT Recommendation and Plan    Visit Dx:    ICD-10-CM ICD-9-CM   1. Facial droop  R29.810 781.94   2. Stroke-like symptoms  R29.90 781.99   3. Atrial fibrillation, unspecified type  I48.91 427.31   4. Impaired functional mobility and activity tolerance [Z74.09]  Z74.09 V49.89   5. Oropharyngeal dysphagia  R13.12 787.22   6. Cerebrovascular accident (CVA), unspecified mechanism  I63.9 434.91        Outcome Measures       Row Name 24 0950 24 0800          How much help from another person do you currently need...    Turning from your back to your side while in flat bed without using bedrails? -- 4  -AH     Moving from lying on back to sitting on the side of a flat bed without bedrails? -- 4  -AH     Moving to and from a bed to a chair (including a wheelchair)? -- 3  -AH     Standing up from a chair using your arms (e.g., wheelchair, bedside chair)? -- 3  -AH     Climbing 3-5 steps with a railing? -- 3  -AH     To walk in hospital room? -- 3  -AH     AM-PAC 6 Clicks Score (PT) -- 20  -AH     Highest Level of Mobility Goal -- 6 --> Walk 10 steps or more  -AH        How much help from another is currently needed...    Putting on and taking off regular lower body clothing? 3  -LS (r) HW (t) LS (c) --     Bathing (including washing, rinsing, and drying) 3  -LS (r) HW (t) LS (c) --     Toileting (which includes using toilet bed pan or urinal) 3  -LS (r) HW (t) LS (c) --     Putting on and taking off regular upper body clothing 3  -LS (r) HW (t) LS (c) --     Taking care of personal grooming (such as brushing teeth) 3  -LS (r) HW (t) LS (c) --     Eating meals 3  -LS (r) HW (t) LS (c) --     AM-PAC 6 Clicks Score (OT) 18  -LS (r) HW (t) --        Functional Assessment    Outcome Measure Options -- AM-PAC 6 Clicks  Basic Mobility (PT)  -AH               User Key  (r) = Recorded By, (t) = Taken By, (c) = Cosigned By      Initials Name Provider Type    Leesa Mendoza PTA Physical Therapist Assistant    Bisi Nina, OTR/L Occupational Therapist    Kay Roberts, OT Student OT Student                     PT Charges       Row Name 03/11/24 0817             Time Calculation    Start Time 0755  -AH      Stop Time 0818  -AH      Time Calculation (min) 23 min  -AH      PT Received On 03/11/24  -         Time Calculation- PT    Total Timed Code Minutes- PT 23 minute(s)  -AH         Timed Charges    19865 - Gait Training Minutes  23  -AH         Total Minutes    Timed Charges Total Minutes 23  -AH       Total Minutes 23  -AH                User Key  (r) = Recorded By, (t) = Taken By, (c) = Cosigned By      Initials Name Provider Type    Leesa Mendoza PTA Physical Therapist Assistant                     PT Rehab Goals       Row Name 03/11/24 1300             Bed Mobility Goal 1 (PT)    Activity/Assistive Device (Bed Mobility Goal 1, PT) bed mobility activities, all  -AB      [place] Level/Cues Needed (Bed Mobility Goal 1, PT) modified independence;independent  -AB      Time Frame (Bed Mobility Goal 1, PT) long term goal (LTG);10 days  -AB      Progress/Outcomes (Bed Mobility Goal 1, PT) goal not met  -AB         Transfer Goal 1 (PT)    Activity/Assistive Device (Transfer Goal 1, PT) sit-to-stand/stand-to-sit;bed-to-chair/chair-to-bed  -AB      [place] Level/Cues Needed (Transfer Goal 1, PT) standby assist;independent  -AB      Time Frame (Transfer Goal 1, PT) long term goal (LTG);10 days  -AB      Progress/Outcome (Transfer Goal 1, PT) goal not met  -AB         Gait Training Goal 1 (PT)    Activity/Assistive Device (Gait Training Goal 1, PT) gait (walking locomotion);decrease fall risk;diminish gait deviation;improve balance and speed;increase endurance/gait distance  -AB      [place] Level (Gait  Training Goal 1, PT) standby assist;independent  -AB      Distance (Gait Training Goal 1, PT) 250+ FT  -AB      Strategies/Barriers (Gait Training Goal 1, PT) MAINTAINING STRAIGHT PATH W/OUT SCISSORING  -AB      Progress/Outcome (Gait Training Goal 1, PT) goal not met  -AB         Stairs Goal 1 (PT)    Activity/Assistive Device (Stairs Goal 1, PT) ascending stairs;descending stairs;step-to-step;decrease fall risk;improve balance and speed  -AB      [place] Level/Cues Needed (Stairs Goal 1, PT) contact guard required  -AB      Number of Stairs (Stairs Goal 1, PT) 1-2  -AB      Time Frame (Stairs Goal 1, PT) long term goal (LTG);10 days  -AB      Progress/Outcome (Stairs Goal 1, PT) goal not met  -AB                User Key  (r) = Recorded By, (t) = Taken By, (c) = Cosigned By      Initials Name Provider Type Discipline    Evelyne Durand PTA Physical Therapist Assistant PT                        PT Discharge Summary  Anticipated Discharge Disposition (PT): home with assist, home with outpatient therapy services  Reason for Discharge: Discharge from facility  Outcomes Achieved: Refer to plan of care for updates on goals achieved  Discharge Destination: Home with assist      Evelyne Turpin PTA   3/11/2024

## 2024-03-11 NOTE — PLAN OF CARE
Goal Outcome Evaluation:  Plan of Care Reviewed With: patient, spouse        Progress: improving       Discharge orders placed. YANNI Santoyo spoke to pt's wife on phone regarding discharge instructions. Waiting fopr wife to arrive to transport home. Diabetes educator will give further education once wife arrives.

## 2024-03-11 NOTE — CONSULTS
"Diabetes Education  Assessment/Teaching    Patient Name:  Rikki Amaya  YOB: 1955  MRN: [id number]  Admit Date:  3/10/2024      Assessment Date:  3/11/2024  Flowsheet Row Most Recent Value   General Information     Height 175.3 cm (69\")   Weight 109 kg (240 lb)   Weight Method Bed scale   Pregnancy Assessment    Diabetes History    Education Preferences    Nutrition Information    Assessment Topics    DM Goals                   Other Comments:  Pt awake and alert.  Admitted with A1C of 8.7%.  Consideration of Insulin at discharge but pt prefers to speak with his PCP before that decision.  Wife in room or discussion.  Given glucometer sample for home use and [employer] sample for home use.  Viewed [employer] film on \"Taking Insulin\" and asked if wife could view when she was in room.  Provided for wife and she had brought pt his glasses.Time left for questions.        Electronically signed by:  Shyanne Szymanksi RN  03/11/24 10:24 CDT  "

## 2024-03-11 NOTE — THERAPY TREATMENT NOTE
Acute Care - Speech Language Pathology   Swallow Treatment Note [facility]     Patient Name: Rikki Amaya  : 1955  MRN: [id number]  Today's Date: 3/11/2024               Admit Date: 3/10/2024    Patient seen on this date to address dysphagia. Patient family present for treatment session. Patient questioning his need for continued thickened liquids. Therefore ST asked to see patient prior to d/c on this date. Patient reports having some sips of water on this date with no difficulty noted. Patient was given thin liquid trials from side of cup. Patient displayed no overt s/s aspiration. Patient also consumed portions of his lunch tray with thin liquids. No overt s/s aspiration noted. Education provided to patient and family re: swallow safety. Starter kit of nectar thickening agent given to patient. Written instructions provided. Patient given information re: thickener as precautionary, if symptoms worsen after d/c. Fatigue could possibly impact performance later in the day. Also, discussed reflux precautions with patient and family. Patient is safe to advance to regular solids with thin liquids. Patient would benefit from continued speech therapy to address swallowing ex's and diet recommendations.      Wendy Kay, SLP 3/11/2024 12:54 CDT      Visit Dx:     ICD-10-CM ICD-9-CM   1. Facial droop  R29.810 781.94   2. Stroke-like symptoms  R29.90 781.99   3. Atrial fibrillation, unspecified type  I48.91 427.31   4. Impaired functional mobility and activity tolerance [Z74.09]  Z74.09 V49.89   5. Oropharyngeal dysphagia  R13.12 787.22   6. Cerebrovascular accident (CVA), unspecified mechanism  I63.9 434.91     Patient Active Problem List   Diagnosis    Lichen planus    Laryngopharyngeal reflux    HTN (hypertension), benign    Barbosa's esophagus without dysplasia    Controlled type 2 diabetes mellitus without complication, without long-term current use of insulin    Hx of colonic polyps    Gastroesophageal  reflux disease with esophagitis    Acute right frontal stroke    TIA (transient ischemic attack)    Atrial fibrillation, new finding    Mixed hyperlipidemia     Past Medical History:   Diagnosis Date    Basal cell carcinoma     Hyperlipidemia     Hypertension     Kidney stone     Stroke 3/10/2024     Past Surgical History:   Procedure Laterality Date    COLONOSCOPY  10/30/2015    Normal exam repeat in 5 years    COLONOSCOPY N/A 11/22/2022    Procedure: COLONOSCOPY WITH ANESTHESIA;  Surgeon: Kelvin Mello MD;  Location:  [facility];  Service: Gastroenterology;  Laterality: N/A;  pre: hx polyps  post: polyps  Chito Finley MD     ENDOSCOPY  10/30/2015    Fundic glad polyp stomach    ENDOSCOPY N/A 11/22/2022    Procedure: ESOPHAGOGASTRODUODENOSCOPY WITH ANESTHESIA;  Surgeon: Kelvin Mello MD;  Location:  [facility];  Service: Gastroenterology;  Laterality: N/A;  pre: reflux  post: gastric polyps. dilation.   Chito Finley MD     GANGLION CYST EXCISION      KNEE ARTHROPLASTY      NECK SURGERY      SHOULDER SURGERY      SKIN CANCER EXCISION      UMBILICAL HERNIA REPAIR         SLP Recommendation and Plan     SLP Diet Recommendation: regular textures, water between meals after oral care, with supervision, thin liquids (03/11/24 1215)  Recommended Precautions and Strategies: upright posture during/after eating, small bites of food and sips of liquid (03/11/24 1215)  SLP Rec. for Method of Medication Administration: meds whole, with thick liquids, with puree (03/11/24 1215)     Monitor for Signs of Aspiration: notify SLP if any concerns (03/11/24 1215)        Anticipated Discharge Disposition (SLP): unknown (03/11/24 1215)     Therapy Frequency (Swallow): 3 days per week (03/11/24 1215)  Predicted Duration Therapy Intervention (Days): until discharge (03/11/24 1215)  Oral Care Recommendations: Oral Care before breakfast, after meals and PRN (03/11/24 1215)        Daily Summary of Progress  (SLP): progress toward functional goals is good (03/11/24 1215)               Treatment Assessment (SLP): improved, oral dysphagia, pharyngeal dysphagia (03/11/24 1215)  Treatment Assessment Comments (SLP): see note (03/11/24 1215)  Plan for Continued Treatment (SLP): continue treatment per plan of care (03/11/24 1215)                SWALLOW EVALUATION (Last 72 Hours)       SLP Adult Swallow Evaluation       Row Name 03/11/24 1215 03/10/24 0900                Rehab Evaluation    Document Type therapy note (daily note)  -MD evaluation  -KW       Subjective Information no complaints  -MD no complaints  -KW       Patient Observations alert;cooperative;agree to therapy  -MD alert;cooperative  -KW       Patient/Family/Caregiver Comments/Observations family present  -MD wife present  -KW       Patient Effort good  -MD good  -KW          General Information    Patient Profile Reviewed -- yes  -KW       Pertinent History Of Current Problem -- admit with left side weakness, passed swallow screen but presents with left facial droop.  -KW       Current Method of Nutrition -- NPO  -KW       Precautions/Limitations, Vision -- WFL;for purposes of eval  -KW       Precautions/Limitations, Hearing -- WFL;for purposes of eval  -KW       Prior Level of Function-Communication -- motor speech impairment  -KW       Prior Level of Function-Swallowing -- no diet consistency restrictions  -KW       Plans/Goals Discussed with -- patient and family  -KW       Barriers to Rehab -- none identified  -KW       Patient's Goals for Discharge -- return to all previous roles/activities  -KW       Family Goals for Discharge -- patient able to return to all previous activities/roles  -KW          Pain Scale: Numbers Pre/Post-Treatment    Pretreatment Pain Rating 0/10 - no pain  -MD --       Posttreatment Pain Rating 0/10 - no pain  -MD --          Oral Motor Structure and Function    Dentition Assessment -- natural, present and adequate  -KW        Secretion Management -- WNL/WFL  -KW       Mucosal Quality -- moist, healthy  -KW          Oral Musculature and Cranial Nerve Assessment    Oral Motor General Assessment -- oral labial or buccal impairment  -KW       Oral Labial or Buccal Impairment, Detail, Cranial Nerve VII (Facial): -- left labial droop  -KW          General Eating/Swallowing Observations    Eating/Swallowing Skills -- fed by SLP  -       Positioning During Eating -- upright in bed  -KW       Utensils Used -- spoon;straw  -KW       Consistencies Trialed -- regular textures;thin liquids;nectar/syrup-thick liquids;honey-thick liquids;pudding thick  -          Clinical Swallow Eval    Oral Prep Phase -- WF  -KW       Oral Transit -- WFL  -KW       Oral Residue -- WFL  -KW       Pharyngeal Phase -- suspected pharyngeal impairment  -KW       Esophageal Phase -- suspected esophageal impairment  -KW          Pharyngeal Phase Concerns    Pharyngeal Phase Concerns -- cough;throat clear  -KW       Cough -- thin;nectar  -KW       Throat Clear -- nectar  -KW          Esophageal Phase Concerns    Esophageal Phase Concerns -- globus  -KW       Esophageal Phase Concerns, Comment -- chronic  -          SLP Evaluation Clinical Impression    SLP Swallowing Diagnosis -- mild-moderate;oral dysphagia;pharyngeal dysphagia  -       Functional Impact -- risk of aspiration/pneumonia  -       Rehab Potential/Prognosis, Swallowing -- good, to achieve stated therapy goals  -       Swallow Criteria for Skilled Therapeutic Interventions Met -- demonstrates skilled criteria  -          SLP Treatment Clinical Impressions    Treatment Assessment (SLP) improved;oral dysphagia;pharyngeal dysphagia  -MD --       Treatment Assessment Comments (SLP) see note  -MD --       Daily Summary of Progress (SLP) progress toward functional goals is good  -MD --       Plan for Continued Treatment (SLP) continue treatment per plan of care  -MD --       Care Plan Review  risks/benefits reviewed;current/potential barriers reviewed;patient/other agree to care plan  -MD --       Care Plan Review, Other Participant(s) caregiver;family  -MD --          Recommendations    Therapy Frequency (Swallow) 3 days per week  -MD 3 days per week  -KW       Predicted Duration Therapy Intervention (Days) until discharge  -MD until discharge  -KW       SLP Diet Recommendation regular textures;water between meals after oral care, with supervision;thin liquids  -MD regular textures;nectar thick liquids;water between meals after oral care, with supervision  -KW       Recommended Precautions and Strategies upright posture during/after eating;small bites of food and sips of liquid  -MD upright posture during/after eating;small bites of food and sips of liquid  -KW       Oral Care Recommendations Oral Care before breakfast, after meals and PRN  -MD Oral Care before breakfast, after meals and PRN  -KW       SLP Rec. for Method of Medication Administration meds whole;with thick liquids;with puree  -MD meds whole;with thick liquids;with puree  -KW       Monitor for Signs of Aspiration notify SLP if any concerns  -MD notify SLP if any concerns  -KW       Anticipated Discharge Disposition (SLP) unknown  -MD unknown  -KW          Swallow Goals (SLP)    Swallow LTGs Patient will demonstrate functional swallow for  -MD Patient will demonstrate functional swallow for  -KW       Swallow STGs diet tolerance goal selection (SLP);labial strengthening goal selection (SLP);lingual strengthening goal selection (SLP);pharyngeal strengthening exercise goal selection (SLP);swallow compensatory strategies goal selection (SLP)  -MD diet tolerance goal selection (SLP);labial strengthening goal selection (SLP);lingual strengthening goal selection (SLP);pharyngeal strengthening exercise goal selection (SLP);swallow compensatory strategies goal selection (SLP)  -KW       Diet Tolerance Goal Selection (SLP) Patient will tolerate  trials of  -MD Patient will tolerate trials of  -KW       Labial Strengthening Goal Selection (SLP) labial strengthening, SLP goal 1  -MD labial strengthening, SLP goal 1  -KW       Lingual Strengthening Goal Selection (SLP) lingual strengthening, SLP goal 1  -MD lingual strengthening, SLP goal 1  -KW       Pharyngeal Strengthening Exercise Goal Selection (SLP) pharyngeal strengthening exercise, SLP goal 1  -MD pharyngeal strengthening exercise, SLP goal 1  -KW       Swallow Compensatory Strategies Goal Selection (SLP) swallow compensatory strategies, SLP goal 1  -MD swallow compensatory strategies, SLP goal 1  -KW          (LTG) Patient will demonstrate functional swallow for    Diet Texture (Demonstrate functional swallow) regular textures  -MD regular textures  -KW       Liquid viscosity (Demonstrate functional swallow) thin liquids  -MD thin liquids  -KW       [place] (Demonstrate functional swallow) independently (over 90% accuracy)  -MD independently (over 90% accuracy)  -KW       Time Frame (Demonstrate functional swallow) by discharge  -MD by discharge  -KW       Progress/Outcomes (Demonstrate functional swallow) good progress toward goal  -MD new goal  -KW          (STG) Patient will tolerate trials of    Consistencies Trialed (Tolerate trials) regular textures;thin liquids;nectar/ mildly thick liquids  -MD regular textures;thin liquids;nectar/ mildly thick liquids  -KW       Desired Outcome (Tolerate trials) without signs/symptoms of aspiration  -MD without signs/symptoms of aspiration  -KW       [place] (Tolerate trials) independently (over 90% accuracy)  -MD independently (over 90% accuracy)  -KW       Time Frame (Tolerate trials) by discharge  -MD by discharge  -KW       Progress/Outcomes (Tolerate trials) good progress toward goal  -MD new goal  -KW          (STG) Labial Strengthening Goal 1 (SLP)    Activity (Labial Strengthening Goal 1, SLP) increase labial tone  -MD increase labial tone   -KW       Increase Labial Tone labial resistance exercises  -MD labial resistance exercises  -KW       [place]/Accuracy (Labial Strengthening Goal 1, SLP) independently (over 90% accuracy)  -MD independently (over 90% accuracy)  -KW       Time Frame (Labial Strengthening Goal 1, SLP) short term goal (STG);by discharge  -MD short term goal (STG);by discharge  -KW       Progress/Outcomes (Labial Strengthening Goal 1, SLP) good progress toward goal  -MD new goal  -KW          (STG) Lingual Strengthening Goal 1 (SLP)    Activity (Lingual Strengthening Goal 1, SLP) increase lingual tone/sensation/control/coordination/movement  -MD increase lingual tone/sensation/control/coordination/movement  -KW       Increase Lingual Tone/Sensation/Control/Coordination/Movement lingual movement exercises  -MD lingual movement exercises  -KW       [place]/Accuracy (Lingual Strengthening Goal 1, SLP) independently (over 90% accuracy)  -MD independently (over 90% accuracy)  -KW       Time Frame (Lingual Strengthening Goal 1, SLP) short term goal (STG);by discharge  -MD short term goal (STG);by discharge  -KW       Progress/Outcomes (Lingual Strengthening Goal 1, SLP) good progress toward goal  -MD new goal  -KW          (STG) Pharyngeal Strengthening Exercise Goal 1 (SLP)    Activity (Pharyngeal Strengthening Goal 1, SLP) increase tongue base retraction  -MD increase tongue base retraction  -KW       Increase Tongue Base Retraction hard effortful swallow;diamond  -MD hard effortful swallow;diamond  -KW       [place]/Accuracy (Pharyngeal Strengthening Goal 1, SLP) independently (over 90% accuracy)  -MD independently (over 90% accuracy)  -KW       Time Frame (Pharyngeal Strengthening Goal 1, SLP) by discharge;short term goal (STG)  -MD by discharge;short term goal (STG)  -KW       Progress/Outcomes (Pharyngeal Strengthening Goal 1, SLP) goal ongoing  -MD new goal  -KW          (STG) Swallow Compensatory Strategies Goal 1 (SLP)     Activity (Swallow Compensatory Strategies/Techniques Goal 1, SLP) during p.o. trials;during meal intake;small bites;small cup sips;small straw sips;food/liquid placed on stronger right side;alternate food/liquid intake;chin tuck posture  -MD during p.o. trials;during meal intake;small bites;small cup sips;small straw sips;food/liquid placed on stronger right side;alternate food/liquid intake;chin tuck posture  -KW       [place]/Accuracy (Swallow Compensatory Strategies/Techniques Goal 1, SLP) independently (over 90% accuracy)  -MD independently (over 90% accuracy)  -KW       Time Frame (Swallow Compensatory Strategies/Techniques Goal 1, SLP) by discharge  -MD by discharge  -KW       Progress/Outcomes (Swallow Compensatory Strategies/Techniques Goal 1, SLP) good progress toward goal  -MD new goal  -KW                 User Key  (r) = Recorded By, (t) = Taken By, (c) = Cosigned By      Initials Name Effective Dates    Gina Sales MS-CCC/SLP, [facility] 07/11/23 -     Wendy Varghese, SLP 06/21/22 -                     EDUCATION  The patient has been educated in the following areas:   Dysphagia (Swallowing Impairment).        SLP GOALS       Row Name 03/11/24 1215 03/10/24 0900          (LTG) Patient will demonstrate functional swallow for    Diet Texture (Demonstrate functional swallow) regular textures  -MD regular textures  -KW     Liquid viscosity (Demonstrate functional swallow) thin liquids  -MD thin liquids  -KW     [place] (Demonstrate functional swallow) independently (over 90% accuracy)  -MD independently (over 90% accuracy)  -KW     Time Frame (Demonstrate functional swallow) by discharge  -MD by discharge  -KW     Progress/Outcomes (Demonstrate functional swallow) good progress toward goal  -MD new goal  -KW        (STG) Patient will tolerate trials of    Consistencies Trialed (Tolerate trials) regular textures;thin liquids;nectar/ mildly thick liquids  -MD regular textures;thin  liquids;nectar/ mildly thick liquids  -KW     Desired Outcome (Tolerate trials) without signs/symptoms of aspiration  -MD without signs/symptoms of aspiration  -KW     [place] (Tolerate trials) independently (over 90% accuracy)  -MD independently (over 90% accuracy)  -KW     Time Frame (Tolerate trials) by discharge  -MD by discharge  -KW     Progress/Outcomes (Tolerate trials) good progress toward goal  -MD new goal  -KW        (STG) Labial Strengthening Goal 1 (SLP)    Activity (Labial Strengthening Goal 1, SLP) increase labial tone  -MD increase labial tone  -KW     Increase Labial Tone labial resistance exercises  -MD labial resistance exercises  -KW     [place]/Accuracy (Labial Strengthening Goal 1, SLP) independently (over 90% accuracy)  -MD independently (over 90% accuracy)  -KW     Time Frame (Labial Strengthening Goal 1, SLP) short term goal (STG);by discharge  -MD short term goal (STG);by discharge  -KW     Progress/Outcomes (Labial Strengthening Goal 1, SLP) good progress toward goal  -MD new goal  -KW        (STG) Lingual Strengthening Goal 1 (SLP)    Activity (Lingual Strengthening Goal 1, SLP) increase lingual tone/sensation/control/coordination/movement  -MD increase lingual tone/sensation/control/coordination/movement  -KW     Increase Lingual Tone/Sensation/Control/Coordination/Movement lingual movement exercises  -MD lingual movement exercises  -KW     [place]/Accuracy (Lingual Strengthening Goal 1, SLP) independently (over 90% accuracy)  -MD independently (over 90% accuracy)  -KW     Time Frame (Lingual Strengthening Goal 1, SLP) short term goal (STG);by discharge  -MD short term goal (STG);by discharge  -KW     Progress/Outcomes (Lingual Strengthening Goal 1, SLP) good progress toward goal  -MD new goal  -KW        (STG) Pharyngeal Strengthening Exercise Goal 1 (SLP)    Activity (Pharyngeal Strengthening Goal 1, SLP) increase tongue base retraction  -MD increase tongue base  retraction  -KW     Increase Tongue Base Retraction hard effortful swallow;diamond  -MD hard effortful swallow;diamond  -KW     [place]/Accuracy (Pharyngeal Strengthening Goal 1, SLP) independently (over 90% accuracy)  -MD independently (over 90% accuracy)  -KW     Time Frame (Pharyngeal Strengthening Goal 1, SLP) by discharge;short term goal (STG)  -MD by discharge;short term goal (STG)  -KW     Progress/Outcomes (Pharyngeal Strengthening Goal 1, SLP) goal ongoing  -MD new goal  -KW        (STG) Swallow Compensatory Strategies Goal 1 (SLP)    Activity (Swallow Compensatory Strategies/Techniques Goal 1, SLP) during p.o. trials;during meal intake;small bites;small cup sips;small straw sips;food/liquid placed on stronger right side;alternate food/liquid intake;chin tuck posture  -MD during p.o. trials;during meal intake;small bites;small cup sips;small straw sips;food/liquid placed on stronger right side;alternate food/liquid intake;chin tuck posture  -KW     [place]/Accuracy (Swallow Compensatory Strategies/Techniques Goal 1, SLP) independently (over 90% accuracy)  -MD independently (over 90% accuracy)  -KW     Time Frame (Swallow Compensatory Strategies/Techniques Goal 1, SLP) by discharge  -MD by discharge  -KW     Progress/Outcomes (Swallow Compensatory Strategies/Techniques Goal 1, SLP) good progress toward goal  -MD new goal  -KW               User Key  (r) = Recorded By, (t) = Taken By, (c) = Cosigned By      Initials Name Provider Type    Gina Sales, MS-CCC/SLP, [facility] Speech and Language Pathologist    Wendy Varghese, SLP Speech and Language Pathologist                       Time Calculation:    Time Calculation- SLP       Row Name 03/11/24 1254             Time Calculation- SLP    SLP Start Time 1215  -MD      SLP Stop Time 1254  -MD      SLP Time Calculation (min) 39 min  -MD      SLP Received On 03/11/24  -MD         Untimed Charges    40604-MA Treatment Swallow Minutes 39  -MD          Total Minutes    Untimed Charges Total Minutes 39  -MD       Total Minutes 39  -MD                User Key  (r) = Recorded By, (t) = Taken By, (c) = Cosigned By      Initials Name Provider Type    Wendy Varghese, SLP Speech and Language Pathologist                    Therapy Charges for Today       Code Description Service Date Service Provider Modifiers Qty    [id number] HC ST TREATMENT SWALLOW 3 3/11/2024 Wendy Kay, SLP GN 1                 MARIETTA Christensen  3/11/2024

## 2024-03-11 NOTE — PLAN OF CARE
Goal Outcome Evaluation:  Plan of Care Reviewed With: patient, spouse        Progress: improving          Pt taken downstairs in wheelchair to transport home in personal vehicle in stable condition.

## 2024-03-11 NOTE — PLAN OF CARE
Goal Outcome Evaluation:  Plan of Care Reviewed With: patient        Progress: improving  Outcome Evaluation: OT tx complete. Pt A&Ox4. Pt sitting up in chair upon OT arrival. SBA for sit <> stand and ambulation from chair <> BR. Pt grazed or bumped into objects on L x2 during ambulation. Pt required supervision w/ vc to complete oral care & wash face standing unsupported sinkside.  Pt demos decreased safety awareness throughout session and anterior lean while standing sinkside. OT recommends home w/ assist and outpatient therapy.      Anticipated Discharge Disposition (OT): home with assist, home with outpatient therapy services

## 2024-03-11 NOTE — PAYOR COMM NOTE
"Rikki Amaya (68 y.o. Male)   [id number]      Admit 03/11 [facility]   [place] [phone]  Fax [phone]   Date of Birth   1955    Social Security Number       Address   [address] [place] [address]    Home Phone   [phone]    MRN   [id number]       Confucianist   Scientologist    Marital Status                               Admission Date   3/10/24    Admission Type   Emergency    Admitting Provider   Nino Rousseau MD    Attending Provider   Nino Rousseau MD    Department, Room/Bed   [facility] 3A, 335/1       Discharge Date       Discharge Disposition   Home or Self Care    Discharge Destination                                 Attending Provider: Nino Rousseau MD    Allergies: No Known Allergies    Isolation: None   Infection: None   Code Status: CPR    Ht: 175.3 cm (69\")   Wt: 109 kg (240 lb)    Admission Cmt: None   Principal Problem: Acute right frontal stroke [I63.9]                   Active Insurance as of 3/10/2024       Primary Coverage       Payor Plan Insurance Group Employer/Plan Group    [employer] BLUE CROSS AN[employer]M BLUE CROSS BLUE SHIELD PPO [id number]       Payor Plan Address Payor Plan Phone Number Payor Plan Fax Number Effective Dates    [address] [phone]  1/1/2019 - None Entered    [place] [address]         Subscriber Name Subscriber Birth Date Member ID       RIKKI AMAYA 1955 [id number]                     Emergency Contacts        (Rel.) Home Phone Work Phone Mobile Phone    Leesa Amaya (Spouse) [phone] -- [phone]                 History & Physical        Stoney Chavez MD at 03/10/24 0712              [facility] Hospital Medicine Services  HISTORY AND PHYSICAL    Date of Admission: 3/10/2024  Primary Care Physician: Chito Finley MD    Subjective   Primary Historian: Patient    Chief Complaint: Left facial droop, speech " difficulty    History of Present Illness  68 year old male with PMH of HTN, dyslipidemia, hypothyroidism, kidney stone, that presents to the ER with complaints of waking up with left facial droop, difficulty with speech, left arm weakness and difficulty getting up from the bed as in having no torso strength. At the time of exam his residual deficits are left facial droop and slight speech heaviness. Interestingly EKG is consistent with rate controlled Afib.     Review of Systems   Otherwise complete ROS reviewed and negative except as mentioned in the HPI.    Past Medical History:   Past Medical History:   Diagnosis Date    Basal cell carcinoma     Hyperlipidemia     Hypertension     Kidney stone     Stroke 3/10/2024     Past Surgical History:  Past Surgical History:   Procedure Laterality Date    COLONOSCOPY  10/30/2015    Normal exam repeat in 5 years    COLONOSCOPY N/A 11/22/2022    Procedure: COLONOSCOPY WITH ANESTHESIA;  Surgeon: Kelvin Mello MD;  Location: [facility] ENDOSCOPY;  Service: Gastroenterology;  Laterality: N/A;  pre: hx polyps  post: polyps  Chito Finley MD     ENDOSCOPY  10/30/2015    Fundic glad polyp stomach    ENDOSCOPY N/A 11/22/2022    Procedure: ESOPHAGOGASTRODUODENOSCOPY WITH ANESTHESIA;  Surgeon: Kelvin Mello MD;  Location: [facility] ENDOSCOPY;  Service: Gastroenterology;  Laterality: N/A;  pre: reflux  post: gastric polyps. dilation.   Chito Finley MD     GANGLION CYST EXCISION      KNEE ARTHROPLASTY      NECK SURGERY      SHOULDER SURGERY      SKIN CANCER EXCISION      UMBILICAL HERNIA REPAIR       Social History:  reports that he has never smoked. He has quit using smokeless tobacco.  His smokeless tobacco use included chew. He reports that he does not drink alcohol and does not use drugs.    Family History: family history includes Hypertension in his mother; Melanoma in his father.       Allergies:  No Known Allergies    Medications:  Prior to Admission medications   "  Medication Sig Start Date End Date Taking? Authorizing Provider   amLODIPine (NORVASC) 10 MG tablet Take 1 tablet by mouth Daily.   Yes Abhi, Nurse Juan David RN   lisinopril 10 MG tablet 20 mg, hydrochlorothiazide 25 MG tablet 12.5 mg Take  by mouth Daily. No longer taking   Yes Abhi, Nurse Juan David RN   metFORMIN (GLUCOPHAGE) 500 MG tablet Take 1 tablet by mouth 2 (Two) Times a Day With Meals.   Yes Kameron Rios MD   metoprolol succinate XL (TOPROL-XL) 100 MG 24 hr tablet Take 1 tablet by mouth Daily.   Yes Abhi, Nurse Juan David RN   oxymetazoline (AFRIN) 0.05 % nasal spray 2 sprays into the nostril(s) as directed by provider 2 (Two) Times a Day.   Yes Kameron Rios MD   pantoprazole (PROTONIX) 40 MG EC tablet Take 1 tablet by mouth Daily.   Yes Abhi, Nurse Juan David RN   simvastatin (ZOCOR) 10 MG tablet Take 1 tablet by mouth Every Night.   Yes Nurse Juan David Moe RN   betamethasone, augmented, (DIPROLENE) 0.05 % lotion Apply  topically Daily. As needed 2/15/18   Garry Hernandez MD   Ibuprofen-Diphenhydramine Cit (ADVIL PM PO) Take  by mouth.    ProviderKameron MD   loratadine (CLARITIN) 10 MG tablet Take 1 tablet by mouth Daily.    Provider, MD Kameron     I have utilized all available immediate resources to obtain, update, or review the patient's current medications (including all prescriptions, over-the-counter products, herbals, cannabis/cannabidiol products, and vitamin/mineral/dietary (nutritional) supplements).    Objective     Vital Signs: /95 (BP Location: Right arm, Patient Position: Lying)   Pulse 76   Temp 98.6 °F (37 °C) (Oral)   Resp 20   Ht 175.3 cm (69\")   Wt 109 kg (240 lb)   SpO2 97%   BMI 35.44 kg/m²   Physical Exam  Constitutional:       Appearance: He is well-developed. He is not ill-appearing, toxic-appearing or diaphoretic.   HENT:      Head: Normocephalic and atraumatic.      Right Ear: External ear normal.      Left Ear: External ear " normal.      Nose: Nose normal.      Mouth/Throat:      Mouth: Mucous membranes are dry.   Eyes:      General:         Right eye: No discharge.         Left eye: No discharge.      Extraocular Movements: Extraocular movements intact.      Conjunctiva/sclera: Conjunctivae normal.      Pupils: Pupils are equal, round, and reactive to light.   Neck:      Vascular: No JVD.   Cardiovascular:      Rate and Rhythm: Regular rhythm. Tachycardia present.      Heart sounds: Normal heart sounds. No murmur heard.  Pulmonary:      Effort: Pulmonary effort is normal. No respiratory distress.      Breath sounds: Normal breath sounds. No wheezing or rales.   Chest:      Chest wall: No tenderness.   Abdominal:      General: Bowel sounds are normal. There is no distension.      Palpations: Abdomen is soft.      Tenderness: There is no abdominal tenderness. There is no guarding or rebound.   Musculoskeletal:         General: No tenderness or deformity. Normal range of motion.      Cervical back: Normal range of motion and neck supple. No rigidity.      Right lower leg: No edema.      Left lower leg: No edema.   Skin:     General: Skin is warm and dry.      Findings: No rash.   Neurological:      Mental Status: He is alert and oriented to person, place, and time.      Cranial Nerves: Cranial nerve deficit present.      Sensory: No sensory deficit.      Motor: No abnormal muscle tone.      Deep Tendon Reflexes: Reflexes normal.      Comments: Left whole face droop   Psychiatric:         Mood and Affect: Mood normal.         Behavior: Behavior normal.     Results Reviewed:  Lab Results (last 24 hours)       Procedure Component Value Units Date/Time    POC Glucose Once [[id number]]  (Abnormal) Collected: 03/10/24 0630    Specimen: Blood Updated: 03/10/24 0641     Glucose 216 mg/dL      Comment: : [id number] Cedric LoriMeter ID: [id number]       [place] Draw [[id number]] Collected: 03/10/24 0417    Specimen: Blood Updated: 03/10/24 0530     Narrative:      The following orders were created for panel order [place] Draw.  Procedure                               Abnormality         Status                     ---------                               -----------         ------                     Green Top (Gel)[[id number]]                                  Final result               Lavender Top[[id number]]                                     Final result               Red Top[[id number]]                                          Final result               Light Blue Top[[id number]]                                   Final result                 Please view results for these tests on the individual orders.    Green Top (Gel) [[id number]] Collected: 03/10/24 0417    Specimen: Blood Updated: 03/10/24 0530     Extra Tube Hold for add-ons.     Comment: Auto resulted.       Lavender Top [[id number]] Collected: 03/10/24 0417    Specimen: Blood Updated: 03/10/24 0530     Extra Tube hold for add-on     Comment: Auto resulted       Red Top [[id number]] Collected: 03/10/24 0417    Specimen: Blood Updated: 03/10/24 0530     Extra Tube Hold for add-ons.     Comment: Auto resulted.       Light Blue Top [[id number]] Collected: 03/10/24 0417    Specimen: Blood Updated: 03/10/24 0530     Extra Tube Hold for add-ons.     Comment: Auto resulted       Basic Metabolic Panel [[id number]]  (Abnormal) Collected: 03/10/24 0417    Specimen: Blood Updated: 03/10/24 0451     Glucose 286 mg/dL      BUN 16 mg/dL      Creatinine 0.93 mg/dL      Sodium 137 mmol/L      Potassium 4.4 mmol/L      Comment: Specimen hemolyzed.  Result may be falsely elevated.        Chloride 101 mmol/L      CO2 24.0 mmol/L      Calcium 9.2 mg/dL      BUN/Creatinine Ratio 17.2     Anion Gap 12.0 mmol/L      eGFR 88.9 mL/min/1.73     Narrative:      GFR Normal >60  Chronic Kidney Disease <60  Kidney Failure <15      Magnesium [[id number]]  (Normal) Collected: 03/10/24 0417    Specimen: Blood Updated: 03/10/24 0451      Magnesium 2.0 mg/dL     Single High Sensitivity Troponin T [[id number]]  (Normal) Collected: 03/10/24 0417    Specimen: Blood Updated: 03/10/24 0451     HS Troponin T 10 ng/L      Comment: Specimen hemolyzed.  Results may be falsely decreased.       Narrative:      High Sensitive Troponin T Reference Range:  <14.0 ng/L- Negative Female for AMI  <22.0 ng/L- Negative Male for AMI  >=14 - Abnormal Female indicating possible myocardial injury.  >=22 - Abnormal Male indicating possible myocardial injury.   Clinicians would have to utilize clinical acumen, EKG, Troponin, and serial changes to determine if it is an Acute Myocardial Infarction or myocardial injury due to an underlying chronic condition.         BNP [[id number]]  (Abnormal) Collected: 03/10/24 0417    Specimen: Blood Updated: 03/10/24 0447     proBNP 1,665.0 pg/mL     Narrative:      This assay is used as an aid in the diagnosis of individuals suspected of having heart failure. It can be used as an aid in the diagnosis of acute decompensated heart failure (ADHF) in patients presenting with signs and symptoms of ADHF to the emergency department (ED). In addition, NT-proBNP of <300 pg/mL indicates ADHF is not likely.    Age Range Result Interpretation  NT-proBNP Concentration (pg/mL:      <50             Positive            >450                   Gray                 300-450                    Negative             <300    50-75           Positive            >900                  Gray                300-900                  Negative            <300      >75             Positive            >1800                  Gray                300-1800                  Negative            <300    CBC & Differential [[id number]]  (Abnormal) Collected: 03/10/24 0417    Specimen: Blood Updated: 03/10/24 0432    Narrative:      The following orders were created for panel order CBC & Differential.  Procedure                               Abnormality         Status                      ---------                               -----------         ------                     CBC Auto Differential[[id number]]        Abnormal            Final result                 Please view results for these tests on the individual orders.    CBC Auto Differential [[id number]]  (Abnormal) Collected: 03/10/24 0417    Specimen: Blood Updated: 03/10/24 0432     WBC 6.54 10*3/mm3      RBC 4.98 10*6/mm3      Hemoglobin 15.5 g/dL      Hematocrit 43.1 %      MCV 86.5 fL      MCH 31.1 pg      MCHC 36.0 g/dL      RDW 11.6 %      RDW-SD 36.4 fl      MPV 10.4 fL      Platelets 160 10*3/mm3      Neutrophil % 62.2 %      Lymphocyte % 25.2 %      Monocyte % 9.9 %      Eosinophil % 1.7 %      Basophil % 0.5 %      Immature Grans % 0.5 %      Neutrophils, Absolute 4.07 10*3/mm3      Lymphocytes, Absolute 1.65 10*3/mm3      Monocytes, Absolute 0.65 10*3/mm3      Eosinophils, Absolute 0.11 10*3/mm3      Basophils, Absolute 0.03 10*3/mm3      Immature Grans, Absolute 0.03 10*3/mm3      nRBC 0.0 /100 WBC     POC Glucose Once [[id number]]  (Abnormal) Collected: 03/10/24 0413    Specimen: Blood Updated: 03/10/24 0425     Glucose 257 mg/dL      Comment: : [id number] Cade RebaMeter ID: [id number]             Imaging Results (Last 24 Hours)       Procedure Component Value Units Date/Time    CT Head Without Contrast [[id number]] Resulted: 03/10/24 0506     Updated: 03/10/24 0509          I have personally reviewed and interpreted the radiology studies and ECG obtained at time of admission.     Assessment / Plan   Assessment:   Active Hospital Problems    Diagnosis     **Stroke     HTN (hypertension), benign     Gastroesophageal reflux disease with esophagitis     Controlled type 2 diabetes mellitus without complication, without long-term current use of insulin      Treatment Plan  The patient will be admitted to my service here at [facility].   Vitals and neuro checks every 4 hours  PT/OT/SLP evaluation  NPO until SLP  "evaluation  IVF KVO prn  Lipid panel, A1C check  Neurology consult and work up  MRI brain/CTA brain and neck  Echocardiogram 2D    Afib rate control > Start Eliquis    DM \"borderline\" morning blood sugar 286 concerning for DM 2  Accu check q6h  Humalog low dose sliding scale  Hypoglycemia protocol    DVT prophylaxis > Patient anticoagulated    Medical Decision Making  Number and Complexity of problems: 3 complex medical problems  Differential Diagnosis: see above    Conditions and Status        Condition is unchanged.     [facility] Data  External documents reviewed: [facility] EHR  Cardiac tracing (EKG, telemetry) interpretation: Afib ventricular rate 80 bpm  Radiology interpretation: See above  Labs reviewed: See above  Any tests that were considered but not ordered: none     Decision rules/scores evaluated (example [id number], [place], etc): [id number] 4-5     Discussed with: Patient and wife at bedside     Care Planning  Shared decision making: Patient  Code status and discussions: Full code    Disposition  Social Determinants of Health that impact treatment or disposition: none  Estimated length of stay is to be determined, likely over 2 midnights.     I confirmed that the patient's advanced care plan is present, code status is documented, and a surrogate decision maker is listed in the patient's medical record.     The patient's surrogate decision maker is Leesa Amaya, spouse.     The patient was seen and examined by me on 3/10/2024 at 0655 AM.    Electronically signed by Stoney Chavez MD, 03/10/24, 07:55 CDT.              Electronically signed by Stoney Chavez MD at 03/10/24 0809          Emergency Department Notes        Linda Hanson, RN at 03/10/24 0602          Nursing report ED to floor  Rikki CRAWFORD Peeler  68 y.o.  male    HPI:   Chief Complaint   Patient presents with    Neuro Deficit(s)       Admitting doctor:   Stoney Chavez MD    Consulting provider(s):  Consults       Date and " Time Order Name Status Description    3/10/2024  5:49 AM Inpatient Neurology Consult Stroke               Admitting diagnosis:   The primary encounter diagnosis was Facial droop. Diagnoses of Stroke-like symptoms and Atrial fibrillation, unspecified type were also pertinent to this visit.    Code status:   Current Code Status       Date Active Code Status Order ID Comments User Context       Not on file            Allergies:   Patient has no known allergies.    Intake and Output  No intake or output data in the 24 hours ending 03/10/24 0602    Weight:       03/10/24  0413   Weight: 107 kg (236 lb)       Most recent vitals:   Vitals:    03/10/24 0415 03/10/24 0419 03/10/24 0500 03/10/24 0501   BP:  (!) 132/103  145/95   Pulse:  74  64   Resp:       Temp: 97.6 °F (36.4 °C)      SpO2:  94% 92%    Weight:       Height:         Oxygen Therapy: .    Active LDAs/IV Access:   Lines, Drains & Airways       Active LDAs       Name Placement date Placement time Site Days    Peripheral IV 03/10/24 0416 Left Antecubital 03/10/24  0416  Antecubital  less than 1    Peripheral IV 03/10/24 0416 Right Forearm 03/10/24  0416  Forearm  less than 1                    Labs (abnormal labs have a star):   Labs Reviewed   BASIC METABOLIC PANEL - Abnormal; Notable for the following components:       Result Value    Glucose 286 (*)     All other components within normal limits    Narrative:     GFR Normal >60  Chronic Kidney Disease <60  Kidney Failure <15     BNP (IN-HOUSE) - Abnormal; Notable for the following components:    proBNP 1,665.0 (*)     All other components within normal limits    Narrative:     This assay is used as an aid in the diagnosis of individuals suspected of having heart failure. It can be used as an aid in the diagnosis of acute decompensated heart failure (ADHF) in patients presenting with signs and symptoms of ADHF to the emergency department (ED). In addition, NT-proBNP of <300 pg/mL indicates ADHF is not  likely.    Age Range Result Interpretation  NT-proBNP Concentration (pg/mL:      <50             Positive            >450                   Gray                 300-450                    Negative             <300    50-75           Positive            >900                  Gray                300-900                  Negative            <300      >75             Positive            >1800                  Gray                300-1800                  Negative            <300   CBC WITH AUTO DIFFERENTIAL - Abnormal; Notable for the following components:    MCHC 36.0 (*)     RDW 11.6 (*)     RDW-SD 36.4 (*)     All other components within normal limits   POCT GLUCOSE FINGERSTICK - Abnormal; Notable for the following components:    Glucose 257 (*)     All other components within normal limits   MAGNESIUM - Normal   SINGLE HSTROPONIN T - Normal    Narrative:     High Sensitive Troponin T Reference Range:  <14.0 ng/L- Negative Female for AMI  <22.0 ng/L- Negative Male for AMI  >=14 - Abnormal Female indicating possible myocardial injury.  >=22 - Abnormal Male indicating possible myocardial injury.   Clinicians would have to utilize clinical acumen, EKG, Troponin, and serial changes to determine if it is an Acute Myocardial Infarction or myocardial injury due to an underlying chronic condition.        [place] DRAW    Narrative:     The following orders were created for panel order [place] Draw.  Procedure                               Abnormality         Status                     ---------                               -----------         ------                     Green Top (Gel)[[id number]]                                  Final result               Lavender Top[[id number]]                                     Final result               Red Top[[id number]]                                          Final result               Light Blue Top[[id number]]                                   Final result                 Please view  results for these tests on the individual orders.   GREEN TOP   LAVENDER TOP   RED TOP   LIGHT BLUE TOP   CBC AND DIFFERENTIAL    Narrative:     The following orders were created for panel order CBC & Differential.  Procedure                               Abnormality         Status                     ---------                               -----------         ------                     CBC Auto Differential[[id number]]        Abnormal            Final result                 Please view results for these tests on the individual orders.       Meds given in ED:   Medications - No data to display  No current facility-administered medications for this encounter.       NIH Stroke Scale:  Interval: baseline    Isolation/Infection(s):  No active isolations   No active infections     COVID Testing  Collected .  Resulted .    Nursing report ED to floor:  Mental status: .A&O x4  Ambulatory status: .standby  Precautions: .fall    ED nurse phone extentsion- .. [phone]      Electronically signed by Linda Hanson RN at 03/10/24 0602       Grady Pacheco MD at 03/10/24 0116          Subjective   History of Present Illness  Patient presents due to facial droop.  Left-sided.  Noticed it when he got up around 4 AM.  Did not go to bed with it.  Also presents in A-fib, and does not know about this.  Does not have a history of A-fib.  No pain anywhere.  He says he has occasional chest tightness.  No swelling in his legs, shortness of breath.  No head injuries or syncope.  He went to bed normal around 9 PM.    Review of Systems   Constitutional:  Negative for chills and fever.   Respiratory:  Negative for cough and shortness of breath.    Cardiovascular:  Negative for palpitations and leg swelling.   Gastrointestinal:  Negative for abdominal pain and vomiting.   Genitourinary:  Negative for difficulty urinating and dysuria.   Neurological:  Positive for facial asymmetry. Negative for syncope.       Past Medical History:    Diagnosis Date    Basal cell carcinoma     Hyperlipidemia     Hypertension     Kidney stone     Stroke 3/10/2024       No Known Allergies    Past Surgical History:   Procedure Laterality Date    COLONOSCOPY  10/30/2015    Normal exam repeat in 5 years    COLONOSCOPY N/A 11/22/2022    Procedure: COLONOSCOPY WITH ANESTHESIA;  Surgeon: Kelvin Mello MD;  Location: [facility] ENDOSCOPY;  Service: Gastroenterology;  Laterality: N/A;  pre: hx polyps  post: polyps  Chito Finley MD     ENDOSCOPY  10/30/2015    Fundic glad polyp stomach    ENDOSCOPY N/A 11/22/2022    Procedure: ESOPHAGOGASTRODUODENOSCOPY WITH ANESTHESIA;  Surgeon: Kelvin Mello MD;  Location: [facility] ENDOSCOPY;  Service: Gastroenterology;  Laterality: N/A;  pre: reflux  post: gastric polyps. dilation.   Chito Finley MD     GANGLION CYST EXCISION      KNEE ARTHROPLASTY      NECK SURGERY      SHOULDER SURGERY      SKIN CANCER EXCISION      UMBILICAL HERNIA REPAIR         Family History   Problem Relation Age of Onset    Hypertension Mother     Melanoma Father     Colon cancer Neg Hx        Social History     Socioeconomic History    Marital status:    Tobacco Use    Smoking status: Never    Smokeless tobacco: Former     Types: Chew   Vaping Use    Vaping status: Never Used   Substance and Sexual Activity    Alcohol use: No    Drug use: No    Sexual activity: Defer           Objective   Physical Exam  Vitals reviewed.   Constitutional:       General: He is not in acute distress.  HENT:      Head: Normocephalic and atraumatic.   Eyes:      Extraocular Movements: Extraocular movements intact.      Conjunctiva/sclera: Conjunctivae normal.   Cardiovascular:      Pulses: Normal pulses.      Heart sounds: Normal heart sounds.   Pulmonary:      Effort: Pulmonary effort is normal. No respiratory distress.      Breath sounds: Normal breath sounds. No wheezing.   Abdominal:      General: Abdomen is flat. There is no distension.      Tenderness:  There is no abdominal tenderness.   Musculoskeletal:      Cervical back: Normal range of motion and neck supple.   Skin:     General: Skin is warm and dry.   Neurological:      General: No focal deficit present.      Mental Status: He is alert. Mental status is at baseline.      Comments: Right upper extremity: 5/5 strength with handgrip and flexion/extension of shoulders, elbows.   Light touch sensation intact and equal when compared to the left upper extremity.    Left upper extremity: 5/5 strength with handgrip and flexion/extension of shoulders, elbows.   Light touch sensation intact and equal when compared to the right upper extremity.    Right lower extremity: 5/5 strength with flexion/extension of hips, knees, and dorsi/plantarflexion of ankles. Able to wiggle toes.   Light touch sensation intact and equal when compared to the left lower extremity.    Left lower extremity: 5/5 strength with flexion/extension of hips, knees, and dorsi/plantarflexion of ankles. Able to wiggle toes.   Light touch sensation intact and equal when compared to the right lower extremity.    Light sensation intact in bilateral face. CN 2-12 normal except for mild left sided facial droop. FNF normal. Heel-shin normal.   No peripheral field cuts  Speech and attention normal   Psychiatric:         Behavior: Behavior normal.         Thought Content: Thought content normal.         Procedures          ED Course                          Total (NIH Stroke Scale): 2                  Medical Decision Making  Problems Addressed:  Atrial fibrillation, unspecified type: complicated acute illness or injury  Facial droop: complicated acute illness or injury  Stroke-like symptoms: complicated acute illness or injury    Amount and/or Complexity of Data Reviewed  Labs: ordered.  Radiology: ordered.    Risk  Decision regarding hospitalization.      Rikki Amaya is a 68 y.o. male with PMH above who presents to the Emergency Department with facial  droop.  ECG shows A-fib.  Not on a blood thinner.  Likely stroke.  There is forehead sparing so Bell's palsy is unlikely.  An stroke scale is a 1, not a candidate for tPA or thrombectomy.  Labs and CT head ordered given differential of intracranial hemorrhage.    ED Course:   -Labs show elevated BNP.  CT head without any acute intercranial normality my review.  Plan is for admission for management of new onset afib with likely stroke.      Final diagnosis: Stroke    All questions answered. Patient/family was understanding and in agreement with today's assessment and plan. The patient was monitored during their stay in the ED and dispositioned without acute event.    Electronically signed by:  Grady Pacheco MD 3/11/2024 03:53 CDT      Note: Dragon medical dictation software was used in the creation of this note.      Final diagnoses:   Facial droop   Stroke-like symptoms   Atrial fibrillation, unspecified type       ED Disposition  ED Disposition       ED Disposition   Decision to Admit    Condition   --    Comment   Level of Care: Remote Telemetry [26]   Diagnosis: TIA (transient ischemic attack) [156436]   Admitting Physician: LUIS FRITZ [[id number]]   Attending Physician: LUIS FRITZ [[id number]]   Certification: I Certify That Inpatient Hospital Services Are Medically Necessary For Greater Than 2 Midnights                 No follow-up provider specified.       Medication List      No changes were made to your prescriptions during this visit.            Grady Pacheco MD  03/11/24 0353      Electronically signed by Grady Pacheco MD at 03/11/24 0353          Physician Progress Notes (last 24 hours)        Nino Rousseau MD at 03/10/24 1113              [facility] Medicine Services  INPATIENT PROGRESS NOTE    Patient Name: Rikki Amaya  Date of Admission: 3/10/2024  Today's Date: 03/10/24  Length of Stay: 0  Primary Care Physician:  Chito Finley MD    Subjective   Chief Complaint: Facial droop, left upper extremity weakness  HPI   Patient admitted last night with left facial droop and speech difficulty, noted to have left upper extremity weakness.  He was found to be on atrial fibrillation and this is a new diagnosis for him.  He was evaluated earlier today by night physician.  Per family, symptoms started approximately 3 AM today.  Patient reports history of hypertension and hyperlipidemia, but no diagnosed diabetes.  His hemoglobin A1c was found to be above 8 today.  Electrocardiogram showed atrial fibrillation, controlled rate, with no palpitations reported.  No chest pain.      Review of Systems   All pertinent negatives and positives are as above. All other systems have been reviewed and are negative unless otherwise stated.     Objective    Temp:  [97.6 °F (36.4 °C)-98.6 °F (37 °C)] 98.6 °F (37 °C)  Heart Rate:  [64-82] 76  Resp:  [15-20] 20  BP: (132-158)/() 145/95  Physical Exam  Constitutional:       Appearance: Normal appearance.  Found lying in bed.  Family members present.  HENT:      Head: Normocephalic and atraumatic.      Nose: Nose normal.      Mouth/Throat:      Mouth: Mucous membranes are moist.      Pharynx: Oropharynx is clear.   Eyes:      Extraocular Movements: Extraocular movements intact.      Conjunctiva/sclera: Conjunctivae normal.      Pupils: Pupils are equal, round, and reactive to light.   Cardiovascular:      Rate and Rhythm: irregular rhythm.      Pulses: Normal pulses.   Pulmonary:      Effort: No respiratory distress.      Breath sounds: Normal breath sounds. No wheezing, rhonchi or rales.   Abdominal:      General: Abdomen is obese.  Bowel sounds are normal.      Palpations: Abdomen is soft.      Tenderness: There is no guarding or rebound.   Musculoskeletal:         General: Normal range of motion.      Cervical back: Normal range of motion and neck supple.   Extremities:  No lower extremity  "edema.  Skin:     Capillary Refill: Capillary refill takes less than 2 seconds.      Coloration: Skin is not jaundiced.      Findings: No rash.   Neurological:      There is weakness of the left upper extremity 4 out of 5; strength of the right upper and bilateral lower extremities is 5 out of 5.  Gait was not evaluated.  Memory slightly impaired.  Alert oriented to place time and person.  Left facial weakness.    Psychiatric:         Mood and Affect: Mood normal.         Behavior: Behavior normal.           Results Review:  I have reviewed the labs, radiology results, and diagnostic studies.    Laboratory Data:   Results from last 7 days   Lab Units 03/10/24  0417   WBC 10*3/mm3 6.54   HEMOGLOBIN g/dL 15.5   HEMATOCRIT % 43.1   PLATELETS 10*3/mm3 160        Results from last 7 days   Lab Units 03/10/24  0417   SODIUM mmol/L 137   POTASSIUM mmol/L 4.4   CHLORIDE mmol/L 101   CO2 mmol/L 24.0   BUN mg/dL 16   CREATININE mg/dL 0.93   CALCIUM mg/dL 9.2   GLUCOSE mg/dL 286*       Culture Data:   No results found for: \"BLOODCX\", \"URINECX\", \"WOUNDCX\", \"MRSACX\", \"RESPCX\", \"STOOLCX\"    Radiology Data:   Imaging Results (Last 24 Hours)       Procedure Component Value Units Date/Time    CT Head Without Contrast [[id number]] Collected: 03/10/24 0758     Updated: 03/10/24 0803    Narrative:      EXAMINATION:  CT HEAD WO CONTRAST-  3/10/2024 4:05 AM     HISTORY: Stroke work-up. Left-sided facial drooping. Slurred speech.     TECHNIQUE: Multiple axial images were obtained through the brain without  contrast infusion. Multiplanar images were reconstructed.     DLP: 727.98 mGy.cm Automated dosage reduction technique was utilized to  reduce patient dosage.     COMPARISON: No comparison study.     FINDINGS: There are no hemorrhage, edema or mass effect. There is  minimal atrophy and mild prominence of the lateral ventricles. The  visualized paranasal sinuses are clear. The mastoid air cells are clear.  No calvarial fracture is seen.   "        Impression:      1. No hemorrhage, edema or mass effect.  2. Minimal atrophy with associated prominence of the lateral ventricles.        This report was signed and finalized on 3/10/2024 8:00 AM by Dr. Joey Lisa MD.               I have reviewed the patient's current medications.     Assessment/Plan   Assessment  Active Hospital Problems    Diagnosis     **Stroke     HTN (hypertension), benign     Gastroesophageal reflux disease with esophagitis     Controlled type 2 diabetes mellitus without complication, without long-term current use of insulin        Acute ischemic cerebrovascular accident highly suspected  Newly diagnosed atrial fibrillation  Hyperlipidemia  Hypertension  Obesity, BMI 35  Diabetes mellitus type 2, new diagnosis      Treatment Plan  MRI of the brain without contrast pending.  CT angiogram head pending.  Once bleeding has been ruled out, will start patient on full anticoagulation.  Neurology evaluation  Telemetry monitoring.    Start basal insulin, 10 units at bedtime.  Follow glucose control.  Given recent contrast administration, will hold metformin, but may restart on discharge.    Blood pressure management.  May restart amlodipine tomorrow.    Continue atorvastatin, 80 mg daily.  Continue Toprol- mg p.o. daily    Follow echocardiogram.    Secondary prevention.      Patient and family members informed of plan.  Follow physical therapy, occupational therapy and speech therapy recommendations.  Consider acute rehabilitation.    Medical Decision Making  Number and Complexity of problems: 6, moderate to high complexity  Differential Diagnosis: See above    Conditions and Status        Condition is unchanged.     [facility] Data  External documents reviewed: None  Cardiac tracing (EKG, telemetry) interpretation: Atrial fibrillation, controlled rate  Radiology interpretation: Radiology reports reviewed  Labs reviewed: Yes  Any tests that were considered but not ordered: No     Decision  rules/scores evaluated (example [id number], [place], etc): See chart     Discussed with: Patient and family     Care Planning  Shared decision making: With patient  Code status and discussions: Full code    Disposition  Social Determinants of Health that impact treatment or disposition: Acute ischemic stroke.    Electronically signed by Nino Rousseau MD, 03/10/24, 11:14 CDT.     Electronically signed by Nino Rousseau MD at 03/10/24 1533          All medication doses during the admission are shown, including meds that are no longer on order.  Scheduled Meds Sorted by Name  for Rikki Amaya as of 03/02/24 through 3/11/24    1 Day 3 Days 7 Days 10 Days < Today >   Legend:       Medications 03/02 03/03 03/04 03/05 03/06 03/07 03/08 03/09 03/10 03/11   amLODIPine (NORVASC) tablet 10 mg  Dose: 10 mg  Freq: Daily Route: PO  Start: 03/11/24 0900   Admin Instructions:                0821        apixaban (ELIQUIS) tablet 5 mg  Dose: 5 mg  Freq: Every 12 Hours Scheduled Route: PO  Indications of Use: ATRIAL FIBRILLATION  Start: 03/10/24 1400   Admin Instructions:               1446     2042      0821     2100         aspirin chewable tablet 81 mg  Dose: 81 mg  Freq: Daily Route: PO  Start: 03/10/24 0900 End: 03/10/24 1306   Admin Instructions:               1019 [C]     1306-D/C'd       Or   aspirin suppository 300 mg  Dose: 300 mg  Freq: Daily Route: RE  Start: 03/10/24 0900 End: 03/10/24 1306   Admin Instructions:                    1306-D/C'd       atorvastatin (LIPITOR) tablet 80 mg  Dose: 80 mg  Freq: Nightly Route: PO  Start: 03/10/24 2100   Admin Instructions:               2042      2100         losartan (COZAAR) tablet 100 mg  Dose: 100 mg  Freq: Every 24 Hours Scheduled Route: PO  Start: 03/11/24 0900             0821        And   hydroCHLOROthiazide tablet 25 mg  Dose: 25 mg  Freq: Every 24 Hours Scheduled Route: PO  Start: 03/11/24 0900   Admin Instructions:                0821        insulin detemir  (LEVEMIR) injection 10 Units  Dose: 10 Units  Freq: Daily Route: SC  Start: 03/10/24 1400   Admin Instructions:               1446      0821        insulin regular (humuLIN R,novoLIN R) injection 2-7 Units  Dose: 2-7 Units  Freq: 3 Times Daily Before Meals Route: SC  Start: 03/10/24 0915   Admin Instructions:               ([phone] [phone]      0862     1130     1730        insulin regular (humuLIN R,novoLIN R) injection 2-7 Units  Dose: 2-7 Units  Freq: Every 6 Hours Scheduled Route: SC  Start: 03/10/24 1200 End: 03/10/24 0819   Admin Instructions:               0819-D/C'd       iopamidol (ISOVUE-370) 76 % injection 125 mL  Dose: 125 mL  Freq: Once in Imaging Route: IV  Start: 03/10/24 1345 End: 03/10/24 1250            1250         levothyroxine (SYNTHROID, LEVOTHROID) tablet 25 mcg  Dose: 25 mcg  Freq: Every Early Morning Route: PO  Start: 03/11/24 0745   Admin Instructions:                0821        metoprolol succinate XL (TOPROL-XL) 24 hr tablet 100 mg  Dose: 100 mg  Freq: Daily Route: PO  Start: 03/10/24 0900   Admin Instructions:               1020 [C]      0821        pantoprazole (PROTONIX) EC tablet 40 mg  Dose: 40 mg  Freq: Daily Route: PO  Start: 03/10/24 0900   Admin Instructions:               1018 [C]      0821        perflutren (DEFINITY) lipid microspheres injection 8.476 mg  Dose: 1.3 mL  Freq: Once Route: IV  Start: 03/10/24 1530 End: 03/10/24 1435            1435         sodium chloride 0.9 % flush 10 mL  Dose: 10 mL  Freq: Every 12 Hours Scheduled Route: IV  Start: 03/10/24 0900            1021     2042      0822     2100                    Continuous Meds Sorted by Name  for Rikki Amaya as of 03/02/24 through 3/11/24  Legend:       Medications 03/02 03/03 03/04 03/05 03/06 03/07 03/08 03/09 03/10 03/11               PRN Meds Sorted by Name  for Rikki Amaya as of 03/02/24 through 3/11/24  Legend:       Medications 03/02 03/03 03/04 03/05 03/06 03/07 03/08 03/09 03/10 03/11     acetaminophen (TYLENOL) tablet 650 mg  Dose: 650 mg  Freq: Every 4 Hours PRN Route: PO  PRN Reasons: Mild Pain,Fever  PRN Comment: Temperature greater than or equal to 37 C  Start: 03/10/24 0804   Admin Instructions:                   Or   acetaminophen (TYLENOL) suppository 650 mg  Dose: 650 mg  Freq: Every 4 Hours PRN Route: RE  PRN Reasons: Mild Pain,Fever  PRN Comment: Temperature greater than or equal to 37 C  Start: 03/10/24 0804   Admin Instructions:                   dextrose (D50W) (25 g/50 mL) IV injection 25 g  Dose: 25 g  Freq: Every 15 Minutes PRN Route: IV  PRN Reason: Low Blood Sugar  PRN Comment: Blood Sugar Less Than 70  Start: 03/10/24 0808   Admin Instructions:                   dextrose (GLUTOSE) oral gel 15 g  Dose: 15 g  Freq: Every 15 Minutes PRN Route: PO  PRN Reason: Low Blood Sugar  PRN Comment: Blood sugar less than 70  Start: 03/10/24 0808   Admin Instructions:                   glucagon (GLUCAGEN) injection 1 mg  Dose: 1 mg  Freq: Every 15 Minutes PRN Route: IM  PRN Reason: Low Blood Sugar  PRN Comment: Blood Glucose Less Than 70  Start: 03/10/24 0808   Admin Instructions:                   labetalol (NORMODYNE,TRANDATE) injection 10 mg  Dose: 10 mg  Freq: Every 4 Hours PRN Route: IV  PRN Reason: High Blood Pressure  PRN Comment: SBP over 220  Start: 03/10/24 0803   Admin Instructions:                   sodium chloride 0.9 % flush 10 mL  Dose: 10 mL  Freq: As Needed Route: IV  PRN Reason: Line Care  Start: 03/10/24 0756                sodium chloride 0.9 % infusion 40 mL  Dose: 40 mL  Freq: As Needed Route: IV  PRN Reason: Line Care  Start: 03/10/24 0756   Admin Instructions:

## 2024-03-11 NOTE — PLAN OF CARE
Goal Outcome Evaluation:  Plan of Care Reviewed With: patient        Progress: improving  Outcome Evaluation: pt trans to EOB sba, sit-stand sba, bathroom trans sba, pt amb 200 feet cga-sba, side stepping,backwards and stepping over glove boxes cga-sba, trans to chair sba, pt would benefit from OP therapy

## 2024-03-11 NOTE — THERAPY TREATMENT NOTE
Acute Care - Occupational Therapy Treatment Note  [facility]     Patient Name: Rikki Amaya  : 1955  MRN: [id number]  Today's Date: 3/11/2024             Admit Date: 3/10/2024       ICD-10-CM ICD-9-CM   1. Facial droop  R29.810 781.94   2. Stroke-like symptoms  R29.90 781.99   3. Atrial fibrillation, unspecified type  I48.91 427.31   4. Impaired functional mobility and activity tolerance [Z74.09]  Z74.09 V49.89   5. Oropharyngeal dysphagia  R13.12 787.22   6. Cerebrovascular accident (CVA), unspecified mechanism  I63.9 434.91     Patient Active Problem List   Diagnosis    Lichen planus    Laryngopharyngeal reflux    HTN (hypertension), benign    Barbosa's esophagus without dysplasia    Controlled type 2 diabetes mellitus without complication, without long-term current use of insulin    Hx of colonic polyps    Gastroesophageal reflux disease with esophagitis    Acute right frontal stroke    TIA (transient ischemic attack)    Atrial fibrillation, new finding    Mixed hyperlipidemia     Past Medical History:   Diagnosis Date    Basal cell carcinoma     Hyperlipidemia     Hypertension     Kidney stone     Stroke 3/10/2024     Past Surgical History:   Procedure Laterality Date    COLONOSCOPY  10/30/2015    Normal exam repeat in 5 years    COLONOSCOPY N/A 2022    Procedure: COLONOSCOPY WITH ANESTHESIA;  Surgeon: Kelvin Mello MD;  Location: [facility] ENDOSCOPY;  Service: Gastroenterology;  Laterality: N/A;  pre: hx polyps  post: polyps  Chito Finley MD     ENDOSCOPY  10/30/2015    Fundic glad polyp stomach    ENDOSCOPY N/A 2022    Procedure: ESOPHAGOGASTRODUODENOSCOPY WITH ANESTHESIA;  Surgeon: Kelvin Mello MD;  Location: [facility] ENDOSCOPY;  Service: Gastroenterology;  Laterality: N/A;  pre: reflux  post: gastric polyps. dilation.   Chito Finley MD     GANGLION CYST EXCISION      KNEE ARTHROPLASTY      NECK SURGERY      SHOULDER SURGERY      SKIN CANCER EXCISION      UMBILICAL  HERNIA REPAIR           OT ASSESSMENT FLOWSHEET (Last 12 Hours)       OT Evaluation and Treatment       Row Name 03/11/24 0943                   OT Time and Intention    Subjective Information no complaints  -LS (r) HW (t) LS (c)        Document Type therapy note (daily note)  -LS (r) HW (t) LS (c)        Mode of Treatment occupational therapy  -LS (r) HW (t) LS (c)        Patient Effort good  -LS (r) HW (t) LS (c)           General Information    Existing Precautions/Restrictions fall  -LS (r) HW (t) LS (c)        Barriers to Rehab medically complex  -LS (r) HW (t) LS (c)           Pain Assessment    Pretreatment Pain Rating 0/10 - no pain  -LS (r) HW (t) LS (c)        Posttreatment Pain Rating 0/10 - no pain  -LS (r) HW (t) LS (c)           Cognition    Orientation Status (Cognition) oriented x 4  -LS (r) HW (t) LS (c)        Personal Safety Interventions fall prevention program maintained;gait belt;nonskid shoes/slippers when out of bed;supervised activity  -LS (r) HW (t) LS (c)           Activities of Daily Living    BADL Assessment/Intervention grooming  -LS (r) HW (t) LS (c)           Grooming Assessment/Training    [place] Level (Grooming) oral care regimen;wash face, hands;supervision;verbal cues  -LS (r) HW (t) LS (c)        Position (Grooming) sink side;unsupported standing  -LS (r) HW (t) LS (c)           Functional Mobility    Functional Mobility- Ind. Level standby assist  -LS (r) HW (t) LS (c)        Functional Mobility- Comment pt grazed or bumped into objects x2  -LS (r) HW (t) LS (c)           Transfer Assessment/Treatment    Transfers sit-stand transfer;stand-sit transfer  -LS (r) HW (t) LS (c)           Sit-Stand Transfer    Sit-Stand [place] (Transfers) standby assist  -LS (r) HW (t) LS (c)           Stand-Sit Transfer    Stand-Sit [place] (Transfers) standby assist  -LS (r) HW (t) LS (c)           Plan of Care Review    Plan of Care Reviewed With patient  -LS (r) HW (t) LS (c)         Progress improving  -LS (r) HW (t) LS (c)        Outcome Evaluation OT tx complete. Pt A&Ox4. Pt sitting up in chair upon OT arrival. SBA for sit <> stand and ambulation from chair <> BR. Pt grazed or bumped into objects on L x2 during ambulation. Pt required supervision w/ vc to complete oral care & wash face standing unsupported sinkside.  Pt demos decreased safety awareness throughout session and anterior lean while standing sinkside. OT recommends home w/ assist and outpatient therapy.  -LS (r) HW (t) LS (c)           Positioning and Restraints    Pre-Treatment Position sitting in chair/recliner  -LS (r) HW (t) LS (c)        Post Treatment Position chair  -LS (r) HW (t) LS (c)        In Chair reclined;call light within reach;encouraged to call for assist;with family/caregiver  -LS (r) HW (t) LS (c)           Therapy Plan Review/Discharge Plan (OT)    Anticipated Discharge Disposition (OT) home with assist;home with outpatient therapy services  -LS (r) HW (t) LS (c)           Dressing Goal 1 (OT)    Activity/Device (Dressing Goal 1, OT) dressing skills, all  -LS (r) HW (t) LS (c)        [place]/Cues Needed (Dressing Goal 1, OT) set-up required  -LS (r) HW (t) LS (c)        Time Frame (Dressing Goal 1, OT) long term goal (LTG);by discharge  -LS (r) HW (t) LS (c)        Progress/Outcome (Dressing Goal 1, OT) goal not met  -LS (r) HW (t) LS (c)           Grooming Goal 1 (OT)    Activity/Device (Grooming Goal 1, OT) grooming skills, all  -LS (r) HW (t) LS (c)        [place] (Grooming Goal 1, OT) supervision required;verbal cues required  -LS (r) HW (t) LS (c)        Time Frame (Grooming Goal 1, OT) long term goal (LTG);by discharge  -LS (r) HW (t) LS (c)        Strategies/Barriers (Grooming Goal 1, OT) pt able to scan to find needed items on L side to complete tasks  -LS (r) HW (t) LS (c)        Progress/Outcome (Grooming Goal 1, OT) goal met  -LS (r) HW (t) LS (c)           Self-Feeding Goal 1 (OT)     Activity/Device (Self-Feeding Goal 1, OT) self-feeding skills, all;finger foods;liquids to mouth;scoop food and bring to mouth  -LS (r) HW (t) LS (c)        [place] Level/Cues Needed (Self-Feeding Goal 1, OT) modified independence  -LS (r) HW (t) LS (c)        Time Frame (Self-Feeding Goal 1, OT) long term goal (LTG);by discharge  -LS (r) HW (t) LS (c)        Strategies/Barriers (Self-Feeding Goal 1, OT) Pt able to scan & perform bimanual tasks while initiating use of L UE  -LS (r) HW (t) LS (c)        Progress/Outcomes (Self-Feeding Goal 1, OT) goal not met  -LS (r) HW (t) LS (c)           Problem Specific Goal 1 (OT)    Problem Specific Goal 1 (OT) Pt will complete HEP I'ly with handout  -LS (r) HW (t) LS (c)        Time Frame (Problem Specific Goal 1, OT) long term goal (LTG)  -LS (r) HW (t) LS (c)        Progress/Outcome (Problem Specific Goal 1, OT) goal not met  -LS (r) HW (t) LS (c)                  User Key  (r) = Recorded By, (t) = Taken By, (c) = Cosigned By      Initials Name Effective Dates    Bisi Nina, OTR/L 06/20/22 -     HW Kay Murillo, OT Student 01/09/24 -                      Occupational Therapy Education       Title: PT OT SLP Therapies (Resolved)       Topic: Occupational Therapy (Resolved)       Point: ADL training (Resolved)       Description:   Instruct learner(s) on proper safety adaptation and remediation techniques during self care or transfers.   Instruct in proper use of assistive devices.                  Learning Progress Summary             Patient Acceptance, E, VU,NR by  at 3/10/2024 1129                         Point: Home exercise program (Resolved)       Description:   Instruct learner(s) on appropriate technique for monitoring, assisting and/or progressing therapeutic exercises/activities.                  Learner Progress:  Not documented in this visit.              Point: Precautions (Resolved)       Description:   Instruct learner(s) on prescribed  precautions during self-care and functional transfers.                  Learning Progress Summary             Patient Acceptance, E, VU,NR by  at 3/10/2024 1129                         Point: Body mechanics (Resolved)       Description:   Instruct learner(s) on proper positioning and spine alignment during self-care, functional mobility activities and/or exercises.                  Learner Progress:  Not documented in this visit.                              User Key       Initials Effective Dates Name Provider Type Discipline     07/11/23 -  Radha Mota, OTR/L Occupational Therapist OT                      OT Recommendation and Plan     Plan of Care Review  Plan of Care Reviewed With: patient  Progress: improving  Outcome Evaluation: OT tx complete. Pt A&Ox4. Pt sitting up in chair upon OT arrival. SBA for sit <> stand and ambulation from chair <> BR. Pt grazed or bumped into objects on L x2 during ambulation. Pt required supervision w/ vc to complete oral care & wash face standing unsupported sinkside.  Pt demos decreased safety awareness throughout session and anterior lean while standing sinkside. OT recommends home w/ assist and outpatient therapy.  Plan of Care Reviewed With: patient  Outcome Evaluation: OT tx complete. Pt A&Ox4. Pt sitting up in chair upon OT arrival. SBA for sit <> stand and ambulation from chair <> BR. Pt grazed or bumped into objects on L x2 during ambulation. Pt required supervision w/ vc to complete oral care & wash face standing unsupported sinkside.  Pt demos decreased safety awareness throughout session and anterior lean while standing sinkside. OT recommends home w/ assist and outpatient therapy.     Outcome Measures       Row Name 03/11/24 0950 03/11/24 0800          How much help from another person do you currently need...    Turning from your back to your side while in flat bed without using bedrails? -- 4  -AH     Moving from lying on back to sitting on the side of a flat  bed without bedrails? -- 4  -AH     Moving to and from a bed to a chair (including a wheelchair)? -- 3  -AH     Standing up from a chair using your arms (e.g., wheelchair, bedside chair)? -- 3  -AH     Climbing 3-5 steps with a railing? -- 3  -AH     To walk in hospital room? -- 3  -AH     AM-PAC 6 Clicks Score (PT) -- 20  -AH     Highest Level of Mobility Goal -- 6 --> Walk 10 steps or more  -        How much help from another is currently needed...    Putting on and taking off regular lower body clothing? 3  -LS (r) HW (t) LS (c) --     Bathing (including washing, rinsing, and drying) 3  -LS (r) HW (t) LS (c) --     Toileting (which includes using toilet bed pan or urinal) 3  -LS (r) HW (t) LS (c) --     Putting on and taking off regular upper body clothing 3  -LS (r) HW (t) LS (c) --     Taking care of personal grooming (such as brushing teeth) 3  -LS (r) HW (t) LS (c) --     Eating meals 3  -LS (r) HW (t) LS (c) --     AM-PAC 6 Clicks Score (OT) 18  -LS (r) HW (t) --        Functional Assessment    Outcome Measure Options -- AM-PAC 6 Clicks Basic Mobility (PT)  -               User Key  (r) = Recorded By, (t) = Taken By, (c) = Cosigned By      Initials Name Provider Type     Leesa Mitchell PTA Physical Therapist Assistant    Bisi Nina, OTR/L Occupational Therapist    Kay Roberts, OT Student OT Student                    Time Calculation:    Time Calculation- OT       Row Name 03/11/24 0951 03/11/24 0817          Time Calculation- OT    OT Start Time 0927  -LS (r) HW (t) LS (c) --     OT Stop Time 0950  -LS (r) HW (t) LS (c) --     OT Time Calculation (min) 23 min  -LS (r) HW (t) --     Total Timed Code Minutes- OT 23 minute(s)  -LS (r) HW (t) LS (c) --     OT Received On 03/11/24  -LS (r) HW (t) LS (c) --        Timed Charges    41218 - Gait Training Minutes  -- 23  -     11554 - OT Self Care/Mgmt Minutes 23  -LS (r) HW (t) LS (c) --        Total Minutes    Timed Charges Total Minutes 23   -LS (r) HW (t) 23  -AH      Total Minutes 23  -LS (r) HW (t) 23  -AH               User Key  (r) = Recorded By, (t) = Taken By, (c) = Cosigned By      Initials Name Provider Type     Leesa Mitchell, PTA Physical Therapist Assistant    Bisi Nina, OTR/L Occupational Therapist    Kay Roberts, OT Student OT Student                           Kay Murillo, OT Student  3/11/2024

## 2024-03-12 NOTE — OUTREACH NOTE
Prep Survey      Flowsheet Row Responses   [facility]nosticist facility patient discharged from? [facility]   Is LACE score < 7 ? Yes   Eligibility Readm Mgmt   Discharge diagnosis Right frontal stroke   Does the patient have one of the following disease processes/diagnoses(primary or secondary)? Stroke   Does the patient have Home health ordered? No   Is there a DME ordered? No   Prep survey completed? Yes            JEEVAN BATISTA - Registered Nurse

## 2024-03-12 NOTE — PAYOR COMM NOTE
"REF:  [id number]     [facility]  FAX  [phone]      Rikki Amaya (68 y.o. Male)       Date of Birth   1955    Social Security Number       Address   [address] [place] [address]    Home Phone   [phone]    MRN   [id number]       Zoroastrianism   Zoroastrianism    Marital Status                               Admission Date   3/10/24    Admission Type   Emergency    Admitting Provider   Nino Rousseau MD    Attending Provider       Department, Room/Bed   [facility] 3A, 335/1       Discharge Date   3/11/2024    Discharge Disposition   Home or Self Care    Discharge Destination                                 Attending Provider: (none)   Allergies: No Known Allergies    Isolation: None   Infection: None   Code Status: Prior    Ht: 175.3 cm (69\")   Wt: 109 kg (240 lb)    Admission Cmt: None   Principal Problem: Acute right frontal stroke [I63.9]                   Active Insurance as of 3/10/2024       Primary Coverage       Payor Plan Insurance Group Employer/Plan Group    [employer] [facility] BLUE SHIELD PPO [id number]       Payor Plan Address Payor Plan Phone Number Payor Plan Fax Number Effective Dates    [address] [phone]  1/1/2019 - None Entered    [place] [address]         Subscriber Name Subscriber Birth Date Member ID       RIKKI AMAYA 1955 [id number]                     Emergency Contacts        (Rel.) Home Phone Work Phone Mobile Phone    Leesa Amaya (Spouse) [phone] -- [phone]                 Discharge Summary        Yarelis Yip APRN at 03/11/24 0828       Attestation signed by Nino Rousseau MD at 03/11/24 4803    I have reviewed this documentation and agree.                        [facility] Medicine Services  DISCHARGE SUMMARY     Date of Admission: 3/10/2024  Date of Discharge:  3/11/2024  Primary Care Physician: Chito Finley " MD Felix    Presenting Problem/History of Present Illness:  Left facial droop, speech difficulty    Final Discharge Diagnoses:  Active Hospital Problems    Diagnosis     **Acute right frontal stroke     Atrial fibrillation, new finding     Mixed hyperlipidemia     HTN (hypertension), benign     Gastroesophageal reflux disease with esophagitis     Controlled type 2 diabetes mellitus without complication, without long-term current use of insulin        Consults: , neurology    Procedures Performed: None    Pertinent Test Results:   Results for orders placed during the hospital encounter of 03/10/24    Adult Transthoracic Echo Complete W/ Cont if Necessary Per Protocol (With Agitated Saline)    Interpretation Summary    Left ventricular systolic function is normal. Left ventricular ejection fraction appears to be 61 - 65%.    Left ventricular wall thickness is consistent with mild concentric hypertrophy    Left ventricular diastolic function was normal.    Normal right ventricular cavity size and systolic function noted.    Saline test results are negative for right to left atrial level shunt.    There is mild calcification of the aortic valve. There is mild to moderate thickening of the aortic valve. No aortic valve regurgitation is present. No hemodynamically significant aortic valve stenosis is present.      Imaging Results (All)       Procedure Component Value Units Date/Time    CT Angiogram Carotids [[id number]] Collected: 03/10/24 1506     Updated: 03/10/24 1513    Narrative:      EXAMINATION:  CT ANGIOGRAM CAROTIDS-  3/10/2024 11:17 AM     HISTORY: Stroke, follow up; R29.810-Facial weakness; R29.90-Unspecified  symptoms and signs involving the nervous system; I48.91-Unspecified  atrial fibrillation; Z74.09-Other reduced mobility; R13.12-Dysphagia,  oropharyngeal phase.     COMPARISON : No comparison study.     DLP: 380.93 mGy.cm Automated dosage reduction technique was utilized to  reduce patient  dosage.     TECHNIQUE: CT angio was performed of the neck with IV contrast. Coronal,  sagittal and 3-D reconstruction were performed.     INDEPENDENT 3-D WORKSTATION UTILIZED FOR RECONSTRUCTION: No.     VERTEBRAL ARTERIES: The right vertebral artery is very small in caliber.  It is partially obscured in the mid to lower neck due to severe streak  artifact related to cervical spine hardware. The left vertebral artery  is also partially obscured. The left vertebral artery is dominant. The  left vertebral artery originates from the aortic arch. The origin of the  right vertebral artery has a normal appearance.     RIGHT CAROTID ARTERY: There may be very minimal soft plaque in the right  carotid bifurcation. The right carotid vessels in the neck are uniformly  slightly smaller than the left side which is likely developmental. No  flow-limiting right carotid stenosis is seen.     LEFT CAROTID ARTERY: There is no left carotid plaque or stenosis.     OTHER FINDINGS: The parotid and submandibular glands are symmetric. The  thyroid gland is unremarkable. There is no mass identified in the neck.  There are no enlarged lymph nodes. The lung apices are clear.          Impression:      1. NASCET criteria utilized.  2. The vertebral arteries are patent in the neck bilaterally without  plaque or stenosis. The left vertebral artery is dominant and originates  from the aortic arch. A portion of both vertebral arteries are obscured  due to streak artifact in the neck related to hardware.  3. There appears to be mild noncalcified plaque in the right carotid  bifurcation that does not cause any luminal narrowing. The right carotid  vessels are all slightly smaller in size compared to the left carotid  vessels which I think is a developmental variant.     This report was signed and finalized on 3/10/2024 3:10 PM by Dr. Joey Lisa MD.       CT Angiogram Head w AI Analysis of LVO [[id number]] Collected: 03/10/24 1456     Updated:  03/10/24 1508    Narrative:      EXAMINATION:  CT ANGIOGRAM HEAD W AI ANALYSIS OF LVO-  3/10/2024 11:17  AM     HISTORY: Stroke, follow up; R29.810-Facial weakness; R29.90-Unspecified  symptoms and signs involving the nervous system; I48.91-Unspecified  atrial fibrillation; Z74.09-Other reduced mobility; R13.12-Dysphagia,  oropharyngeal phase.     COMPARISON : No comparison study.     DLP: 380.93 mGy.cm Automated dosage reduction technique was utilized.     TECHNIQUE: CT angio was performed of the brain with IV contrast.  Coronal, sagittal and 3-D images were reconstructed.     INDEPENDENT 3-D WORKSTATION UTILIZED FOR RECONSTRUCTION: YES. A  RADIOLOGIST WAS NOT PRESENT IN THE DEPARTMENT.     FINDINGS: The vertebral and basilar arteries are patent. The right  vertebral artery is small. The internal carotid arteries are patent. The  anterior, middle and posterior cerebral arteries are patent. No  aneurysms are appreciated. The visualized venous sinuses are patent.     AI ANALYSIS: AI analysis demonstrates relative decreased density of  right middle cerebral artery branches. This is reduced to 60-75% of the  density on the left side. I do not see a large vessel occlusion as a  cause. The right internal carotid artery is slightly smaller in caliber  compared to the left internal carotid artery.          Impression:      1. No large vessel occlusion is seen. No significant carotid artery  plaque is seen intracranially.  2. AI analysis demonstrates relative decreased density of right middle  cerebral artery branches reduced to 60-75% compared to the vessels on  the left. There is relative decreased size of the right internal carotid  artery compared to the left which may account for this finding.     This report was signed and finalized on 3/10/2024 3:05 PM by Dr. Joey Lisa MD.       CT CEREBRAL PERFUSION WITH & WITHOUT CONTRAST [[id number]] Collected: 03/10/24 1318     Updated: 03/10/24 1327    Narrative:       EXAMINATION:  CT CEREBRAL PERFUSION W WO CONTRAST-  3/10/2024 11:17 AM     HISTORY: Stroke, follow up.     TECHNIQUE:     1. Perfusion CT is performed to acquire images tracking the temporal  course IV of iodinated contrast material passing through the cerebral  circulation. Perfusion parameters, such as cerebral blood flow (CBF),  cerebral blood volume (CBV), mean transit time (MTT), etc. are  calculated by [employer] with additional provided perfusion maps and  estimated stroke volumes.  2. Automated exposure control (AEC) protocols are utilized on the  scanner to ensure dose lowered technique.     DLP: 1553.05 mGy.cm     COMPARISON without contrast: Noncontrast CT brain and brain angiogram  dated 3/10/2024 11:17 AM     FINDINGS:     There is abnormal perfusion involving the right caudate nucleus and body  in right putamen. Perfusion abnormality demonstrated in the right  cerebellum is likely an artifact as there is no abnormality on MRI in  this area.     Distribution: Perfusion abnormality involves the Right anterior cerebral  artery vascular distribution. The lateral aspect of the putamen is also  supplied by right MCA vessels.     Tmax >6.0 seconds volume: 9 ml     CBF < 30% volume: 0 ml     Mismatch volume: 9 ml     Mismatch ratio: Infinite.       Impression:      Impression:  1. Acute perfusion abnormality involving the right caudate nucleus and  body and right putamen, as described.  2. Perfusion abnormality depicted over the right cerebellum is likely  artifactual as there is no abnormality identified in the right  cerebellum on the MRI.     This report was signed and finalized on 3/10/2024 1:23 PM by Dr. Joey Lisa MD.       MRI Brain Without Contrast [[id number]] Collected: 03/10/24 1305     Updated: 03/10/24 1319    Narrative:      EXAMINATION:  MRI BRAIN WO CONTRAST-  3/10/2024 10:32 AM     HISTORY: Stroke, follow up; R29.810-Facial weakness; R29.90-Unspecified  symptoms and signs involving the  nervous system; I48.91-Unspecified  atrial fibrillation; Z74.09-Other reduced mobility; R13.12-Dysphagia,  oropharyngeal phase     TECHNIQUE: Multiplanar imaging was performed in a high field magnet.     COMPARISON: No comparison study.     FINDINGS: There is an acute infarct involving the right caudate nucleus  and the body and right putamen. There is diffusion restriction on the  ADC map images. There is only limited edema in this area on T2 images.  There is no mass effect or midline shift. The ventricular system is  nondilated. There are a few scattered areas of T2 high signal within the  hemispheric white matter. No mass lesions are appreciated. There is  minimal atrophy. Mild prominence of the lateral ventricles is likely  related.          Impression:      1. Acute infarct involving the right caudate nucleus and body and right  putamen.  2. Minimal chronic ischemic small vessel white matter disease.  3. Mild atrophy with associated prominence of the lateral ventricles.           This report was signed and finalized on 3/10/2024 1:16 PM by Dr. Joey Lisa MD.       CT Head Without Contrast [[id number]] Collected: 03/10/24 0758     Updated: 03/10/24 0803    Narrative:      EXAMINATION:  CT HEAD WO CONTRAST-  3/10/2024 4:05 AM     HISTORY: Stroke work-up. Left-sided facial drooping. Slurred speech.     TECHNIQUE: Multiple axial images were obtained through the brain without  contrast infusion. Multiplanar images were reconstructed.     DLP: 727.98 mGy.cm Automated dosage reduction technique was utilized to  reduce patient dosage.     COMPARISON: No comparison study.     FINDINGS: There are no hemorrhage, edema or mass effect. There is  minimal atrophy and mild prominence of the lateral ventricles. The  visualized paranasal sinuses are clear. The mastoid air cells are clear.  No calvarial fracture is seen.          Impression:      1. No hemorrhage, edema or mass effect.  2. Minimal atrophy with associated  prominence of the lateral ventricles.        This report was signed and finalized on 3/10/2024 8:00 AM by Dr. Joey Lisa MD.             LAB RESULTS:      Lab 03/11/24  0444 03/10/24  0417   WBC 8.30 6.54   HEMOGLOBIN 15.9 15.5   HEMATOCRIT 45.8 43.1   PLATELETS 172 160   NEUTROS ABS  --  4.07   IMMATURE GRANS (ABS)  --  0.03   LYMPHS ABS  --  1.65   MONOS ABS  --  0.65   EOS ABS  --  0.11   MCV 88.9 86.5         Lab 03/11/24  0444 03/10/24  0417   SODIUM 141 137   POTASSIUM 3.5 4.4   CHLORIDE 102 101   CO2 27.0 24.0   ANION GAP 12.0 12.0   BUN 14 16   CREATININE 0.93 0.93   EGFR 88.9 88.9   GLUCOSE 201* 286*   CALCIUM 8.7 9.2   MAGNESIUM  --  2.0   HEMOGLOBIN A1C  --  8.70*         Lab 03/11/24 0444   TOTAL PROTEIN 6.9   ALBUMIN 4.1   GLOBULIN 2.8   ALT (SGPT) 28   AST (SGOT) 17   BILIRUBIN 0.6   ALK PHOS 60         Lab 03/10/24  0417   PROBNP 1,665.0*   HSTROP T 10         Lab 03/10/24  0417   CHOLESTEROL 165   LDL CHOL 107*  111*   HDL CHOL 31*   TRIGLYCERIDES 148             Brief Urine Lab Results       None          Microbiology Results (last 10 days)       ** No results found for the last 240 hours. **            Hospital Course: Mr. Amaya is a 68-year-old male with past medical history of hypertension, dyslipidemia, hypothyroidism, diabetes type 2.  He presented to [facility] emergency room 3/10/2024 with complaints of left facial droop, difficulty with speech, left arm weakness and difficulty getting from the bed on the morning of admission.  Patient noticed symptoms around 4 AM when he woke up.  He denied any pain, chest pain, palpitations.  At this time to ER symptoms improved.  Mild left facial weakness noted.  Mild left sided weakness.  tPA not considered due to timing of symptoms onset as well as symptoms improving.  CT scan of the head showed no hemorrhage, edema or mass effect.  EKG showed atrial fibrillation 80 bpm.  Patient denied any previous history of known atrial  fibrillation.    He was admitted to the neurology floor with acute stroke, hypertension, new finding of rate controlled atrial fibrillation.  Neurochecks ordered every 4 hours.  Patient made n.p.o. until evaluated by speech therapy.  Statin, Eliquis started.  MRI of the brain showed acute infarct involving the right caudate nucleus and body and right putamen.  Minimal chronic ischemic small vessel white matter disease.  Mild atrophy and associated prominence of the lateral ventricles.  CT angiogram head neck noted vertebral arteries patent bilaterally without plaque or stenosis.  Left vertebral artery dominant and originates from the aortic arch.  Portion of both vertebral arteries are obscured due to streaking artifact in the neck related to hardware.  Noncalcified plaque in the right carotid bifurcation that does not cause any luminal narrowing.  Right carotid vessels slightly smaller in size compared to left carotid vessel.  No large vessel occlusion seen.  No significant carotid artery plaque.  Decreased density right middle cerebral artery branch reduced to 60-75% compared to vessels on the left.  Echocardiogram EF 61-65%.  Left ventricular wall thickness consistent with mild concentric hypertrophy.  Diastolic function normal.  Right ventricular cavity size and function normal.  Saline test negative for right to left shunt.  Mild calcification aortic valve.  Mild to moderate thickening aortic valve.  No aortic valve regurgitation present.  No significant aortic valve stenosis.    Neurology consulted and he was seen by  who noted new findings left-sided weakness and atrial fibrillation.  No tPA administered due to time.  MRI of the brain showed acute right frontal stroke.  Patient was cleared to begin anticoagulation with Eliquis for new finding atrial fibrillation and stroke.  Okay to normalize blood pressure with systolic goal less than 140.    Physical therapy, Occupational Therapy, speech therapy  consulted and recommended outpatient therapy.  Patient ambulated 200 feet sidestepping, backward and stepping over a box of globes.  Patient transferred to chair with standby assistance.  No acute needs identified other than outpatient PT OT and ST.    Hemoglobin A1c 8.7.  Patient takes metformin 1000 mg twice daily.  Metformin held on admission.  Accu-Cheks with sliding scale insulin coverage ordered.  Glucoses greater than 200.  Patient was placed on Levemir 10 units nightly.  Diabetic educator consulted.  Discussed insulin with patient and he is not interested in insulin therapy at this time.  Patient and wife will further discuss with primary care provider with follow-up appointment on 3/13/2024.  Discussed diet modification as well as regular exercise program.  Metformin held for 48 hours post contrast.  May resume metformin on 3/12/2024.     with goal less than 70.  Cholesterol 165, glycerides 148.  Atorvastatin 80 mg orally nightly started.  Simvastatin discontinued.  Medication changes discussed with patient and wife.    Patient with new finding of atrial fibrillation rate controlled on admission.  [id number]-ORZr score 5.  Patient and wife deny any previous knowledge of atrial fibrillation.  Patient was on metoprolol XL which was likely controlling his rate.  Discussed anticoagulation with Eliquis and patient and wife agreeable.  Will arrange outpatient follow-up with cardiology for new finding of atrial fibrillation in the setting of acute stroke.    Amlodipine, metoprolol XL, olmesartan/HCTZ continued for hypertension.  Blood pressure 132/84, 154/92.    On 3/11/2024, he has been cleared for discharge by neurology and physical therapy.  Patient ambulated 200 feet independently without loss of balance.  Patient will continue outpatient physical therapy, Occupational Therapy, speech therapy.  Eliquis 5 mg orally twice daily started for stroke and new finding of rate controlled atrial fibrillation.   "Atorvastatin 80 mg orally nightly.  Will arrange outpatient follow-up with cardiology for new finding of atrial fibrillation in the setting of stroke.  Follow-up with neurology 1 month, 4/11/24 and follow-up with Dr. Finley 3/13/2024.    Physical Exam on Discharge:  /98 (BP Location: Right arm, Patient Position: Lying)   Pulse 89   Temp 97.7 °F (36.5 °C) (Oral)   Resp 18   Ht 175.3 cm (69\")   Wt 109 kg (240 lb)   SpO2 95%   BMI 35.44 kg/m²   Physical Exam  Vitals and nursing note reviewed.   Constitutional:       Appearance: He is obese.      Comments: Sitting up in chair.  No oxygen use.  No visitors in room.   HENT:      Head: Normocephalic and atraumatic.      Nose: No congestion.      Mouth/Throat:      Pharynx: Oropharynx is clear. No oropharyngeal exudate or posterior oropharyngeal erythema.   Eyes:      Extraocular Movements: Extraocular movements intact.      Pupils: Pupils are equal, round, and reactive to light.   Cardiovascular:      Rate and Rhythm: Normal rate. Rhythm irregular.      Heart sounds: No murmur heard.     Comments: Atrial fibrillation 77 on telemetry  Pulmonary:      Breath sounds: No wheezing, rhonchi or rales.      Comments: No oxygen use.  Abdominal:      Palpations: Abdomen is soft.      Tenderness: There is no abdominal tenderness.   Genitourinary:     Comments: Voiding.  Musculoskeletal:         General: No swelling or tenderness.      Cervical back: Normal range of motion and neck supple.   Skin:     General: Skin is warm and dry.   Neurological:      General: No focal deficit present.      Mental Status: He is alert and oriented to person, place, and time.      Comments: Sitting up in chair.  No word finding issues.  Moves extremities equally.  Follows commands.  Finger-to-nose bilaterally intact.  Knee to shin intact bilaterally.  Slight weakness left upper extremity   Psychiatric:         Mood and Affect: Mood normal.         Behavior: Behavior normal.         Thought " Content: Thought content normal.         Judgment: Judgment normal.       Condition on Discharge: None    Discharge Disposition:  Home or Self Care    Discharge Medications:     Discharge Medications        New Medications        Instructions Start Date   apixaban 5 MG tablet tablet  Commonly known as: ELIQUIS   5 mg, Oral, Every 12 Hours Scheduled      atorvastatin 80 MG tablet  Commonly known as: LIPITOR  Notes to patient: Next dose due tonight 3-11   80 mg, Oral, Nightly             Continue These Medications        Instructions Start Date   ADVIL PM PO   Oral      amLODIPine 5 MG tablet  Commonly known as: NORVASC  Notes to patient: Next dose due in am 3-12   5 mg, Oral, Daily      levothyroxine 25 MCG tablet  Commonly known as: SYNTHROID, LEVOTHROID  Notes to patient: Next dose due in am 3-12   25 mcg, Oral, Daily      loratadine 10 MG tablet  Commonly known as: CLARITIN  Notes to patient: Next dose due in am 3-12   10 mg, Oral, Daily      metFORMIN 500 MG tablet  Commonly known as: GLUCOPHAGE  Notes to patient: Next dose due 3-12 pm   1,000 mg, Oral, 2 Times Daily With Meals.  Hold for 48 hours post contrast.  May resume 3/12/2024 PM dose      metoprolol succinate  MG 24 hr tablet  Commonly known as: TOPROL-XL  Notes to patient: Next dose due in am 3-12   100 mg, Oral, Daily      olmesartan-hydrochlorothiazide 40-25 MG per tablet  Commonly known as: BENICAR HCT  Notes to patient: Next dose due in am 3-12   1 tablet, Oral, Daily      oxymetazoline 0.05 % nasal spray  Commonly known as: AFRIN  Notes to patient: Next dose due tonight 3-11   2 sprays, Nasal, 2 Times Daily      pantoprazole 40 MG EC tablet  Commonly known as: PROTONIX  Notes to patient: Next dose due in am 3-12   40 mg, Oral, Daily             Stop These Medications      meloxicam 15 MG tablet  Commonly known as: MOBIC     simvastatin 10 MG tablet  Commonly known as: ZOCOR              Discharge Diet:   Diet Instructions       Diet: Diabetic  Diets; Consistent Carbohydrate; Regular (IDDSI 7); Thin (IDDSI 0)      Discharge Diet: Diabetic Diets    Diabetic Diet: Consistent Carbohydrate    Texture: Regular (IDDSI 7)    Fluid Consistency: Thin (IDDSI 0)            Activity at Discharge:   Activity Instructions       Activity as Tolerated              Discharge instructions:  1.  For facial drooping, word finding issues, left-sided weakness seek medical attention.  2.  Eliquis 5 mg orally twice daily for new finding atrial fibrillation and stroke  3.  Atorvastatin 80 mg orally nightly  4.  Discontinue simvastatin  5.  Outpatient physical therapy, Occupational Therapy, speech therapy  6.  Follow-up with Dr. Finley on 3/13/2024  7.  Follow-up with neurology 4 weeks, 4/11/2024  8.  Outpatient referral to cardiology for new finding atrial fibrillation, rate controlled  9.  Hold metformin for 48 hours post contrast and may resume on 3/12/2024 PM dose    Follow-up Appointments:   Follow-up with Dr. Finley on 3/13/2024  Follow-up with neurology 4 weeks  Outpatient referral to cardiology for new finding atrial fibrillation, rate controlled      Test Results Pending at Discharge: None    Electronically signed by YANNI Darnell, 03/11/24, 09:52 CDT.    Time: 35 minutes.  Discussed with Dr. De Jesus, Dr. Moreira, neurology, patient, discussed with wife per her cell phone.    The above documentation resulted from a face-to-face encounter by me Yarelis LIAO, [facility].           Electronically signed by Carlos De Jesus MD at 03/11/24 1359       Discharge Order (From admission, onward)       Start     Ordered    03/11/24 0841  Discharge patient  Once        Expected Discharge Date: 03/11/24   Discharge Disposition: Home or Self Care   Physician of Record for Attribution - Please select from Treatment Team: CARLOS DE JESUS [[id number]]   Review needed by CMO to determine Physician of Record: No      Question Answer Comment   Physician of Record for  Attribution - Please select from Treatment Team CARLOS DE JESUS    Review needed by CMO to determine Physician of Record No        03/11/24 0832

## 2024-03-13 NOTE — THERAPY DISCHARGE NOTE
Acute Care - Speech Language Pathology Discharge Summary  [facility]       Patient Name: Rikki Amaya  : 1955  MRN: [id number]    Today's Date: 3/13/2024                   Admit Date: 3/10/2024      SLP Recommendation and Plan  Regular solids and thin liquids    Visit Dx:    ICD-10-CM ICD-9-CM   1. Facial droop  R29.810 781.94   2. Stroke-like symptoms  R29.90 781.99   3. Atrial fibrillation, unspecified type  I48.91 427.31   4. Impaired functional mobility and activity tolerance [Z74.09]  Z74.09 V49.89   5. Oropharyngeal dysphagia  R13.12 787.22   6. Cerebrovascular accident (CVA), unspecified mechanism  I63.9 434.91                SLP GOALS       Row Name 24 1500 24 1215          (LTG) Patient will demonstrate functional swallow for    Diet Texture (Demonstrate functional swallow) regular textures  -MB regular textures  -MD     Liquid viscosity (Demonstrate functional swallow) thin liquids  -MB thin liquids  -MD     [place] (Demonstrate functional swallow) independently (over 90% accuracy)  -MB independently (over 90% accuracy)  -MD     Time Frame (Demonstrate functional swallow) by discharge  -MB by discharge  -MD     Progress/Outcomes (Demonstrate functional swallow) goal partially met  -MB good progress toward goal  -MD        (STG) Patient will tolerate trials of    Consistencies Trialed (Tolerate trials) regular textures;thin liquids;nectar/ mildly thick liquids  -MB regular textures;thin liquids;nectar/ mildly thick liquids  -MD     Desired Outcome (Tolerate trials) without signs/symptoms of aspiration  -MB without signs/symptoms of aspiration  -MD     Tyler (Tolerate trials) independently (over 90% accuracy)  -MB independently (over 90% accuracy)  -MD     Time Frame (Tolerate trials) by discharge  -MB by discharge  -MD     Progress/Outcomes (Tolerate trials) goal partially met  -MB good progress toward goal  -MD        (STG) Labial Strengthening Goal 1 (SLP)    Activity  (Labial Strengthening Goal 1, SLP) increase labial tone  -MB increase labial tone  -MD     Increase Labial Tone labial resistance exercises  -MB labial resistance exercises  -MD     [place]/Accuracy (Labial Strengthening Goal 1, SLP) independently (over 90% accuracy)  -MB independently (over 90% accuracy)  -MD     Time Frame (Labial Strengthening Goal 1, SLP) short term goal (STG);by discharge  -MB short term goal (STG);by discharge  -MD     Progress/Outcomes (Labial Strengthening Goal 1, SLP) goal partially met  -MB good progress toward goal  -MD        (STG) Lingual Strengthening Goal 1 (SLP)    Activity (Lingual Strengthening Goal 1, SLP) increase lingual tone/sensation/control/coordination/movement  -MB increase lingual tone/sensation/control/coordination/movement  -MD     Increase Lingual Tone/Sensation/Control/Coordination/Movement lingual movement exercises  -MB lingual movement exercises  -MD     [place]/Accuracy (Lingual Strengthening Goal 1, SLP) independently (over 90% accuracy)  -MB independently (over 90% accuracy)  -MD     Time Frame (Lingual Strengthening Goal 1, SLP) short term goal (STG);by discharge  -MB short term goal (STG);by discharge  -MD     Progress/Outcomes (Lingual Strengthening Goal 1, SLP) goal partially met  -MB good progress toward goal  -MD        (STG) Pharyngeal Strengthening Exercise Goal 1 (SLP)    Activity (Pharyngeal Strengthening Goal 1, SLP) increase tongue base retraction  -MB increase tongue base retraction  -MD     Increase Tongue Base Retraction hard effortful swallow;diamond  -MB hard effortful swallow;diamond  -MD     [place]/Accuracy (Pharyngeal Strengthening Goal 1, SLP) independently (over 90% accuracy)  -MB independently (over 90% accuracy)  -MD     Time Frame (Pharyngeal Strengthening Goal 1, SLP) by discharge;short term goal (STG)  -MB by discharge;short term goal (STG)  -MD     Progress/Outcomes (Pharyngeal Strengthening Goal 1, SLP) goal not met  -MB  goal ongoing  -MD        ([facility]) Swallow Compensatory Strategies Goal 1 (SLP)    Activity (Swallow Compensatory Strategies/Techniques Goal 1, SLP) during p.o. trials;during meal intake;small bites;small cup sips;small straw sips;food/liquid placed on stronger right side;alternate food/liquid intake;chin tuck posture  -MB during p.o. trials;during meal intake;small bites;small cup sips;small straw sips;food/liquid placed on stronger right side;alternate food/liquid intake;chin tuck posture  -MD     [place]/Accuracy (Swallow Compensatory Strategies/Techniques Goal 1, SLP) independently (over 90% accuracy)  -MB independently (over 90% accuracy)  -MD     Time Frame (Swallow Compensatory Strategies/Techniques Goal 1, SLP) by discharge  -MB by discharge  -MD     Progress/Outcomes (Swallow Compensatory Strategies/Techniques Goal 1, SLP) goal partially met  -MB good progress toward goal  -MD               User Key  (r) = Recorded By, (t) = Taken By, (c) = Cosigned By      Initials Name Provider Type    Luca Jauregui CCC-SLP Speech and Language Pathologist    Wendy Varghese, SLP Speech and Language Pathologist                            SLP Discharge Summary  Anticipated Discharge Disposition (SLP): unknown  Reason for Discharge: discharge from this facility  Progress Toward Achieving Short/long Term Goals: goals partially met within established timelines  Discharge Destination: home      Luca Mancia CCC-SLP  3/13/2024

## 2024-03-15 ENCOUNTER — READMISSION MANAGEMENT (OUTPATIENT)
Dept: CALL CENTER | Facility: HOSPITAL | Age: 69
End: 2024-03-15
Payer: COMMERCIAL

## 2024-03-15 NOTE — OUTREACH NOTE
Stroke Week 1 Survey      Flowsheet Row Responses   [facility] patient discharged from? [place]   Does the patient have one of the following disease processes/diagnoses(primary or secondary)? Stroke   Week 1 attempt successful? Yes   Call start time 1319   Call end time 1329   Discharge diagnosis Right frontal stroke   Is patient permission given to speak with other caregiver? Yes   Person spoke with today (if not patient) and relationship Wife, Leesa Neff reviewed with patient/caregiver? Yes   Does the patient have all medications ordered at discharge? Yes   Is the patient taking all medications as directed (includes completed medication regime)? Yes   Medication comments PCP ordered insulin with follow up appt   Does the patient have a primary care provider?  Yes   Does the patient have an appointment with their PCP within 7 days of discharge? Yes   Comments regarding PCP Patient has seen PCP since discharge.   Has the patient kept scheduled appointments due by today? Yes   Comments New cardiology appt 3/25/24  9am   Has home health visited the patient within 72 hours of discharge? N/A   Psychosocial issues? No   Does the patient require any assistance with activities of daily living such as eating, bathing, dressing, walking, etc.? No   Does the patient have any residual symptoms from stroke/TIA? Yes   Residual symptoms comments Still with some speech slowness.   Does the patient understand the diet ordered at discharge? Yes   Did the patient receive a copy of their discharge instructions? Yes   Nursing interventions Reviewed instructions with patient   What is the patient's perception of their health status since discharge? Improving   Nursing interventions Nurse provided patient education   Is the patient/caregiver able to teach back the risk factors for a stroke? High blood pressure-goal below 120/80, Diabetes, High Cholesterol, History of Afib  [new afib]   Is the patient/caregiver able to  teach back signs and symptoms related to disease process for when to call PCP? Yes   Is the patient/caregiver able to teach back signs and symptoms related to disease process for when to call 911? Yes   If the patient is a current smoker, are they able to teach back resources for cessation? Not a smoker   Is the patient/caregiver able to teach back the hierarchy of who to call/visit for symptoms/problems? PCP, Specialist, Home health nurse, Urgent Care, ED, 911 Yes   Is the patient able to teach back FAST for Stroke? B alance: Watch for sudden loss of balance, E yes: Check for vision loss, F ace: Look for an uneven smile, A rm: Check if one arm is weak, S peech: Listen for slurred speech, T joe: Call 9-1-1 right away   Week 1 call completed? Yes   Is the patient interested in additional calls from an ambulatory ? No   Would this patient benefit from a Referral to [facility] Social Work? No   Call end time 1329            KAILYN SHIPMAN - Registered Nurse

## 2024-03-25 ENCOUNTER — OFFICE VISIT (OUTPATIENT)
Dept: CARDIOLOGY | Facility: CLINIC | Age: 69
End: 2024-03-25
Payer: COMMERCIAL

## 2024-03-25 ENCOUNTER — READMISSION MANAGEMENT (OUTPATIENT)
Dept: CALL CENTER | Facility: HOSPITAL | Age: 69
End: 2024-03-25
Payer: COMMERCIAL

## 2024-03-25 VITALS
HEART RATE: 69 BPM | DIASTOLIC BLOOD PRESSURE: 82 MMHG | WEIGHT: 238 LBS | BODY MASS INDEX: 35.25 KG/M2 | OXYGEN SATURATION: 97 % | HEIGHT: 69 IN | SYSTOLIC BLOOD PRESSURE: 132 MMHG

## 2024-03-25 DIAGNOSIS — I10 HTN (HYPERTENSION), BENIGN: ICD-10-CM

## 2024-03-25 DIAGNOSIS — I48.0 PAROXYSMAL ATRIAL FIBRILLATION: Primary | ICD-10-CM

## 2024-03-25 DIAGNOSIS — E78.2 MIXED HYPERLIPIDEMIA: ICD-10-CM

## 2024-03-25 DIAGNOSIS — G45.9 TIA (TRANSIENT ISCHEMIC ATTACK): ICD-10-CM

## 2024-03-25 DIAGNOSIS — E11.9 CONTROLLED TYPE 2 DIABETES MELLITUS WITHOUT COMPLICATION, WITHOUT LONG-TERM CURRENT USE OF INSULIN: ICD-10-CM

## 2024-03-25 DIAGNOSIS — I63.419 CEREBROVASCULAR ACCIDENT (CVA) DUE TO EMBOLISM OF MIDDLE CEREBRAL ARTERY, UNSPECIFIED BLOOD VESSEL LATERALITY: ICD-10-CM

## 2024-03-25 PROCEDURE — 99204 OFFICE O/P NEW MOD 45 MIN: CPT | Performed by: EMERGENCY MEDICINE

## 2024-03-25 PROCEDURE — 93000 ELECTROCARDIOGRAM COMPLETE: CPT | Performed by: EMERGENCY MEDICINE

## 2024-03-25 NOTE — OUTREACH NOTE
Stroke Week 2 Survey      Flowsheet Row Responses   [facility] patient discharged from? [place]   Does the patient have one of the following disease processes/diagnoses(primary or secondary)? Stroke   Week 2 attempt successful? Yes   Call start time 1458   Call end time 1504   Discharge diagnosis Right frontal stroke   Is patient permission given to speak with other caregiver? Yes   Person spoke with today (if not patient) and relationship Wife, Leesa Neff reviewed with patient/caregiver? Yes   Is the patient having any side effects they believe may be caused by any medication additions or changes? No   Does the patient have all medications ordered at discharge? Yes   Is the patient taking all medications as directed (includes completed medication regime)? Yes   Does the patient have a primary care provider?  Yes   Does the patient have an appointment with their PCP within 7 days of discharge? Yes   Comments regarding PCP Patient has seen PCP since discharge.   Has the patient kept scheduled appointments due by today? Yes   Psychosocial issues? No   Does the patient require any assistance with activities of daily living such as eating, bathing, dressing, walking, etc.? No   Does the patient have any residual symptoms from stroke/TIA? No   Does the patient understand the diet ordered at discharge? No   Did the patient receive a copy of their discharge instructions? Yes   Nursing interventions Reviewed instructions with patient   What is the patient's perception of their health status since discharge? Same   Nursing interventions Nurse provided patient education   Is the patient able to teach back FAST for Stroke? B alance: Watch for sudden loss of balance, E yes: Check for vision loss, F ace: Look for an uneven smile, A rm: Check if one arm is weak, S peech: Listen for slurred speech, T joe: Call 9-1-1 right away   Is the patient/caregiver able to teach back the risk factors for a stroke? High blood  pressure-goal below 120/80, Diabetes, High Cholesterol, History of Afib   Is the patient/caregiver able to teach back signs and symptoms related to disease process for when to call PCP? Yes   Is the patient/caregiver able to teach back signs and symptoms related to disease process for when to call 911? Yes   If the patient is a current smoker, are they able to teach back resources for cessation? Not a smoker   Is the patient/caregiver able to teach back the hierarchy of who to call/visit for symptoms/problems? PCP, Specialist, Home health nurse, Urgent Care, ED, 911 Yes   Week 2 call completed? Yes   Is the patient interested in additional calls from an ambulatory ? No   Would this patient benefit from a Referral to Amb Social Work? No   Call end time 1504            LUIS ANGEL CARRERO - Registered Nurse

## 2024-03-25 NOTE — PROGRESS NOTES
Subjective:     Encounter Date:03/25/2024      Patient ID: Rikki Amaya is a 68 y.o. male.    Chief Complaint:  History of Present Illness  The patient presents for evaluation of atrial fibrillation. He is accompanied by his wife.    Atrial fibrillation  He was recently hospitalized due to a cerebrovascular accident. He continues to experience weakness. He was found to have new onset atrial fibrillation and was taking metoprolol prior to the cerebrovascular accident. He is currently out of atrial fibrillation. A couple of years ago, Dr. Finney placed him on a Holter monitor for approximately 1 week. His wife is inquiring if a cerebrovascular accident could reoccur if he goes back into atrial fibrillation.     He will be traveling to the [facility] on 03/27/2024.     He is currently taking Eliquis twice daily, metoprolol, amlodipine, atorvastatin 80 mg, and Benicar.         Review of Systems   Constitutional: Positive for malaise/fatigue. Negative for diaphoresis and fever.   HENT:  Negative for congestion.    Eyes:  Negative for vision loss in left eye and vision loss in right eye.   Cardiovascular:  Negative for chest pain, claudication, dyspnea on exertion, irregular heartbeat, leg swelling, orthopnea, palpitations and syncope.   Respiratory:  Negative for cough, shortness of breath and wheezing.    Hematologic/Lymphatic: Negative for adenopathy.   Skin:  Negative for rash.   Musculoskeletal:  Negative for joint pain and joint swelling.   Gastrointestinal:  Negative for abdominal pain, diarrhea, nausea and vomiting.   Neurological:  Positive for weakness. Negative for excessive daytime sleepiness, dizziness, light-headedness and numbness.   Psychiatric/Behavioral:  Negative for depression. The patient does not have insomnia.            Current Outpatient Medications:     amLODIPine (NORVASC) 5 MG tablet, Take 1 tablet by mouth Daily., Disp: , Rfl:     apixaban (ELIQUIS) 5 MG tablet tablet, Take 1  tablet by mouth Every 12 (Twelve) Hours. Indications: Atrial Fibrillation, Disp: 60 tablet, Rfl: 1    atorvastatin (LIPITOR) 80 MG tablet, Take 1 tablet by mouth Every Night., Disp: 30 tablet, Rfl: 1    Ibuprofen-Diphenhydramine Cit (ADVIL PM PO), Take  by mouth., Disp: , Rfl:     levothyroxine (SYNTHROID, LEVOTHROID) 25 MCG tablet, Take 1 tablet by mouth Daily., Disp: , Rfl:     loratadine (CLARITIN) 10 MG tablet, Take 1 tablet by mouth Daily., Disp: , Rfl:     metFORMIN (GLUCOPHAGE) 500 MG tablet, Take 2 tablets by mouth 2 (Two) Times a Day With Meals., Disp: , Rfl:     metoprolol succinate XL (TOPROL-XL) 100 MG 24 hr tablet, Take 1 tablet by mouth Daily., Disp: , Rfl:     olmesartan-hydrochlorothiazide (BENICAR HCT) 40-25 MG per tablet, Take 1 tablet by mouth Daily., Disp: , Rfl:     oxymetazoline (AFRIN) 0.05 % nasal spray, 2 sprays into the nostril(s) as directed by provider 2 (Two) Times a Day., Disp: , Rfl:     pantoprazole (PROTONIX) 40 MG EC tablet, Take 1 tablet by mouth Daily., Disp: , Rfl:        Objective:      Vitals:    03/25/24 0917   BP: 132/82   Pulse: 69   SpO2: 97%     Vitals and nursing note reviewed.   Constitutional:       Appearance: Normal and healthy appearance. Well-developed and not in distress.   Eyes:      Extraocular Movements: Extraocular movements intact.      Pupils: Pupils are equal, round, and reactive to light.   HENT:      Head: Normocephalic and atraumatic.    Mouth/Throat:      Pharynx: Oropharynx is clear.   Neck:      Vascular: JVD normal.      Trachea: Trachea normal.   Pulmonary:      Effort: Pulmonary effort is normal.      Breath sounds: Normal breath sounds. No wheezing. No rhonchi. No rales.   Cardiovascular:      PMI at left midclavicular line. Normal rate. Regular rhythm. Normal S1. Normal S2.       Murmurs: There is no murmur.      No gallop.  No click. No rub.   Pulses:     Dorsalis pedis: 2+ bilaterally.     Posterior tibial: 2+ bilaterally.  Abdominal:       General: Bowel sounds are normal.      Palpations: Abdomen is soft.      Tenderness: There is no abdominal tenderness.   Musculoskeletal: Normal range of motion.      Cervical back: Normal range of motion and neck supple. Skin:     General: Skin is warm and dry.      Capillary Refill: Capillary refill takes less than 2 seconds.   Feet:      Right foot:      Skin integrity: Skin integrity normal.      Left foot:      Skin integrity: Skin integrity normal.   Neurological:      Mental Status: Alert and oriented to person, place and time.      Sensory: Sensation is intact.      Motor: Motor function is intact.      Coordination: Coordination is intact.   Psychiatric:         Speech: Speech normal.         Behavior: Behavior is cooperative.         Lab Review:         ECG 12 Lead    Date/Time: 3/25/2024 1:56 PM  Performed by: Shailesh Ta DO    Authorized by: Shailesh Ta DO  Comparison: compared with previous ECG   Similar to previous ECG  Rhythm: sinus rhythm  Rate: normal  Conduction: 1st degree AV block  QRS axis: normal  Other findings: non-specific ST-T wave changes    Clinical impression: abnormal EKG            Assessment/Plan:     Problem List Items Addressed This Visit       HTN (hypertension), benign    Controlled type 2 diabetes mellitus without complication, without long-term current use of insulin    Acute right frontal stroke    TIA (transient ischemic attack)    Atrial fibrillation, new finding - Primary    Relevant Orders    Holter Monitor - 72 Hour Up To 15 Days    Mixed hyperlipidemia     Atrial fibrillation.  An echocardiogram that was obtained during hospitalization revealed EF of 61 to 65 percent, no septal defects, and some calcification of the aortic valve, which continued to open and close well. A previous Holter monitor test revealed SVT, but no atrial fibrillation. He is currently back to a normal rhythm. I will order a repeat Holter monitor test for 10 days to  evaluate palpitations and atrial fibrillation. I advised the patient to consistently take Eliquis twice daily to avoid suffering another cerebrovascular accident. He may travel or fish. Once I have received the results from his Holter monitor test, I will contact him with the results. I advised him to continue his current medications. If any issues should arise prior to his follow up, he will contact the clinic.     Follow-up  He will follow up in 6 months.     Recommendations/plans:    Transcribed from ambient dictation for Shailesh Ta DO by Edd Olivas.  03/25/24   10:52 CDT    Patient or patient representative verbalized consent to the visit recording.  I have personally performed the services described in this document as transcribed by the above individual, and it is both accurate and complete.  Shailesh Ta DO  3/25/2024  13:57 CDT

## 2024-03-26 ENCOUNTER — OFFICE VISIT (OUTPATIENT)
Dept: PHYSICAL THERAPY | Facility: CLINIC | Age: 69
End: 2024-03-26
Payer: COMMERCIAL

## 2024-03-26 ENCOUNTER — TREATMENT (OUTPATIENT)
Dept: PHYSICAL THERAPY | Facility: CLINIC | Age: 69
End: 2024-03-26
Payer: COMMERCIAL

## 2024-03-26 DIAGNOSIS — R41.841 COGNITIVE COMMUNICATION DEFICIT: Primary | ICD-10-CM

## 2024-03-26 DIAGNOSIS — I63.9 CEREBROVASCULAR ACCIDENT (CVA), UNSPECIFIED MECHANISM: Primary | ICD-10-CM

## 2024-03-26 NOTE — PROGRESS NOTES
Physical Therapy Initial Evaluation and Plan of Care  [address] [place] [address]    Patient: Rikki Amaya   : 1955  Referring practitioner: YANNI Colon  Date of Initial Visit: 3/26/2024  Today's Date: 3/26/2024  Patient seen for 1 sessions    Visit Diagnoses:    ICD-10-CM ICD-9-CM   1. Cerebrovascular accident (CVA), unspecified mechanism  I63.9 434.91     Past Medical History:   Diagnosis Date    Basal cell carcinoma     Hyperlipidemia     Hypertension     Kidney stone     Stroke 3/10/2024     Past Surgical History:   Procedure Laterality Date    COLONOSCOPY  10/30/2015    Normal exam repeat in 5 years    COLONOSCOPY N/A 2022    Procedure: COLONOSCOPY WITH ANESTHESIA;  Surgeon: Kelvin Mello MD;  Location: [facility] ENDOSCOPY;  Service: Gastroenterology;  Laterality: N/A;  pre: hx polyps  post: polyps  Chito Fniley MD     ENDOSCOPY  10/30/2015    Fundic glad polyp stomach    ENDOSCOPY N/A 2022    Procedure: ESOPHAGOGASTRODUODENOSCOPY WITH ANESTHESIA;  Surgeon: Kelvin Mello MD;  Location: [facility] ENDOSCOPY;  Service: Gastroenterology;  Laterality: N/A;  pre: reflux  post: gastric polyps. dilation.   Chito Finley MD     GANGLION CYST EXCISION      KNEE ARTHROPLASTY      NECK SURGERY      SHOULDER SURGERY      SKIN CANCER EXCISION      UMBILICAL HERNIA REPAIR           SUBJECTIVE     Subjective Evaluation    History of Present Illness  Mechanism of injury: He had a CVA, L side is weaker. He gets dizzy when he stand sup quickly. He is having fatigue. Got a good report from the heart doctor yesterday. Walking short distances makes his legs feel tired. He has had one fall since the stroke in the house where he lost his balance. He loses his balance when he bends over. He does stand up when he gets dressed. He stands in the shower. He would like to be able to stand up without getting dizzy. Maintenance   [place] [facility] [place] he did a lot of walking and lifting up to 50#.     Pain  Current pain ratin              OBJECTIVE     Objective     Cerebellar exam: heel to shin without dysmetria    LE MMT   HIP Strength L Strength R   flexion 5  5   extension 4+  4+   ABD 4+  4+        KNEE     flexion 5  5   extension 5  4+        ANKLE     dorsiflexion 5  5        BALANCE TESTING  miniBEST:   TU.11 secs  DT TUG: 10.58 secs    6 MWT: 918', no AD, 1 rest break x1:10, 12 RPE on [place] 6-20 scale    ORTHOSTATIC ASSESSMENT  Sittin/69 mmHg  Standin/63 mmHg    Therapy Education/Self Care [id number]   Education offered today Implication of functional test results   [employer] Code    Ongoing HEP      Timed Minutes        Total Timed Treatment:     0   mins  Total Time of Visit:            45   mins    ASSESSMENT/PLAN       Assessment & Plan       Assessment  Assessment details: Rikki Amaya is a 68 year old male who presents s/p CVA on 3/10/2024. His miniBEST and TUG results indicate minimal fall risk; his strength was also WNL. He was below age/sex-matched norms for the 6 MWT indicating decreased activity tolerance, however pt felt he could address stroke-related fatigue w/ home walking program to which this PT concurs. He did report some dizziness w/ transitional movement, however he was negative for OH today. His dizziness may have been a contributing factor to his recent fall which may need to be medically managed. Due to the aforementioned factors, no skilled OP PT services required at this time.     Plan  Therapy options: will not be seen for skilled therapy services        SIGNATURE: Maeve Villagran, PT DPT, [place] License #: [id number]  Electronically Signed on 3/26/2024    Initial Certification  Certification Period: 3/26/2024 through 2024  I certify that the therapy services are furnished while this patient is under my care.  The services outlined above are required by this patient, and will be  reviewed every 90 days.     PHYSICIAN: Yarelis Yip APRN (NPI: [id number])    Signature: _______________________________________DATE: _________    Please sign and return via fax to [phone].   Thank you so much for letting us work with Rikki. I appreciate your letting us work with your patients. If you have any questions or concerns, please don't hesitate to contact me.          [address] [id number]  [phone]

## 2024-03-26 NOTE — PROGRESS NOTES
Speech/Language Pathology Initial Evaluation and Plan of Care    Patient: Rikki Amaya               : 1955  Visit Date: 3/26/2024  Referring practitioner: YANNI Colon  Date of Initial Visit: 3/26/2024  Patient seen for 1 sessions    Visit Diagnoses:    ICD-10-CM ICD-9-CM   1. Cognitive communication deficit  R41.841 799.52     Past Medical History:   Diagnosis Date    Basal cell carcinoma     Hyperlipidemia     Hypertension     Kidney stone     Stroke 3/10/2024     Past Surgical History:   Procedure Laterality Date    COLONOSCOPY  10/30/2015    Normal exam repeat in 5 years    COLONOSCOPY N/A 2022    Procedure: COLONOSCOPY WITH ANESTHESIA;  Surgeon: Kelvin Mello MD;  Location: [facility] ENDOSCOPY;  Service: Gastroenterology;  Laterality: N/A;  pre: hx polyps  post: polyps  Chito Finley MD     ENDOSCOPY  10/30/2015    Fundic glad polyp stomach    ENDOSCOPY N/A 2022    Procedure: ESOPHAGOGASTRODUODENOSCOPY WITH ANESTHESIA;  Surgeon: Kelvin Mello MD;  Location: [facility] ENDOSCOPY;  Service: Gastroenterology;  Laterality: N/A;  pre: reflux  post: gastric polyps. dilation.   hCito Finley MD     GANGLION CYST EXCISION      KNEE ARTHROPLASTY      NECK SURGERY      SHOULDER SURGERY      SKIN CANCER EXCISION      UMBILICAL HERNIA REPAIR         SUBJECTIVE     Subjective Patient was seen today for evaluation post stroke. He feels that he is having some forgetfulness. He denies difficulty with word finding, speech, or cognition/memory. He denies difficulty with swallowing. He was accompanied by his wife who concurred with his report.   Patient presents with the following symptoms: Forgetfulness  Date of Onset: March 10  History of present problems: Patient had a stroke just two weeks ago.   The functional impact on the patient: Denies any significant impact, just minor changes.   Prior level of function/education: AA degree. Patient is a   Pertinent  Medical History Related to this Problem: Afib, hypertension      OBJECTIVE     The [place] Diagnostic Aphasia Examination was administered to assess for aphasia. Patient had minimal difficulty with primarily mild word finding errors.     Standardized Tests   Formal Speech Language Tests [place] Diagnostic Aphasia Examination;[place] Naming,  Short Form   [place] Diagnositc Aphasia Examincation   Severity Rating: [place] Diagnostic Aphasia Examination  5 - Minimal discernable handicap   [place] Diagnostic Aphasia Examination Comments  Patient scored WNL with no difficulties on the BDAE   [place] Naming   Correct Spontaneous Responses 15/15   Stimulus Cues Given 0   Correct Responses Following Stimulus Cues -   Phonemic Cues Given 0   Correct Responses Following Phonemic Cues -   Total Score 15/15   Sebring Naming Comments WNL         IMPRESSION/RECOMMENDATIONS  Factors Affecting Performance:  None  Learning Motivation: strong  Education/Learning Comments: Patient and spouse demonstrated understanding of evaluation results and plan of care.     Clinical Impression: No aphasia or word finding difficulty. Mild forgetfulness.   Impact on Function: Patient denies impact  Prognosis Comment: Good      Therapy Education/Self Care    Details: Evaluation results and POC   Given home strategies   Progress: New   Education provided to:  Patient and Spouse/SO   Level of understanding Verbalized           Total Time of Visit:            45   mins    ASSESSMENT/PLAN       ASSESSMENT:   Patient is not indicated for skilled care by a Speech/Language Pathologist.     Summary of Assessment: No aphasia or word finding difficulty.     PLAN:  Details of Plan of Care: Evaluation only. Patient to complete follow up with PCP      SPEECH/LANGUAGE PATHOLOGIST SIGNATURE: Noreen Simmons CCC-SLP  License # [id number]  Electronically signed on 3/26/2024      Initial Certification  Certification Period: 3/26/2024 through 6/23/2024  I certify that the therapy  services are furnished while this patient is under my care.  The services outlined above are required by this patient, and will be reviewed every 90 days.     PHYSICIAN:     Yarelis Yip APRN______________________________________DATE: _________     Please sign and return via fax to [phone].   Thank you so much for letting us work with Rikki. I appreciate your letting us work with your patients. If you have any questions or concerns, please don't hesitate to contact me.

## 2024-03-29 ENCOUNTER — PATIENT ROUNDING (BHMG ONLY) (OUTPATIENT)
Dept: CARDIOLOGY | Facility: CLINIC | Age: 69
End: 2024-03-29
Payer: COMMERCIAL

## 2024-03-29 NOTE — PROGRESS NOTES
A [employer] A/S message has been sent to the patient for PATIENT ROUNDING with [facility].

## 2024-04-11 ENCOUNTER — OFFICE VISIT (OUTPATIENT)
Dept: NEUROLOGY | Facility: CLINIC | Age: 69
End: 2024-04-11
Payer: COMMERCIAL

## 2024-04-11 VITALS
DIASTOLIC BLOOD PRESSURE: 72 MMHG | BODY MASS INDEX: 34.66 KG/M2 | SYSTOLIC BLOOD PRESSURE: 140 MMHG | RESPIRATION RATE: 18 BRPM | WEIGHT: 234 LBS | HEIGHT: 69 IN | HEART RATE: 74 BPM

## 2024-04-11 DIAGNOSIS — R53.1 LEFT-SIDED WEAKNESS: ICD-10-CM

## 2024-04-11 DIAGNOSIS — Z79.4 TYPE 2 DIABETES MELLITUS WITH HYPERGLYCEMIA, WITH LONG-TERM CURRENT USE OF INSULIN: ICD-10-CM

## 2024-04-11 DIAGNOSIS — E66.9 OBESITY (BMI 30.0-34.9): ICD-10-CM

## 2024-04-11 DIAGNOSIS — E11.65 TYPE 2 DIABETES MELLITUS WITH HYPERGLYCEMIA, WITH LONG-TERM CURRENT USE OF INSULIN: ICD-10-CM

## 2024-04-11 DIAGNOSIS — I69.30 HISTORY OF STROKE WITH RESIDUAL DEFICIT: Primary | ICD-10-CM

## 2024-04-11 DIAGNOSIS — I48.0 PAROXYSMAL ATRIAL FIBRILLATION: ICD-10-CM

## 2024-04-11 DIAGNOSIS — E78.5 HYPERLIPIDEMIA LDL GOAL <70: ICD-10-CM

## 2024-04-11 DIAGNOSIS — Z79.01 CHRONIC ANTICOAGULATION: ICD-10-CM

## 2024-04-11 DIAGNOSIS — I10 HTN (HYPERTENSION), BENIGN: ICD-10-CM

## 2024-04-11 RX ORDER — INSULIN GLARGINE 100 [IU]/ML
6 INJECTION, SOLUTION SUBCUTANEOUS DAILY
COMMUNITY

## 2024-04-11 NOTE — PROGRESS NOTES
Neurology Consult Note    [facility]  [address], [address]  [place] [address]  Phone: [phone]  Fax: [phone]    Referring Provider:   No ref. provider found  Primary Care Provider:  Chito Finley MD    Reason for Consult:  Hospital follow-up stroke  Subjective        Rikki Amaya presents to [facility]    History of Present Illness  68-year-old male seen today for hospital follow-up for stroke.  Patient seen emergency room on 3/10/2024 with complaints of left-sided facial droop.  Onset 04:00 3/10/2024 when patient woke.  Last known well was 21:00 3/9/2024.  While in the emergency room he was noted he is in atrial fibrillation.  Patient denied any prior knowledge or diagnosis of atrial fibrillation.  Code stroke was not activated due to last known well greater than 4 and half hours.  Additionally he was not a thrombolytic candidate due to last known well greater than 4.5 hours and not a thrombectomy candidate due to no large vessel occlusion.  His workup did reveal an acute infarction involving the right caudate nucleus and body in the right putamen.  Etiology likely new onset atrial fibrillation.  Patient was discharged on apixaban 5 mg twice daily as well as atorvastatin 80 mg nightly.  He was discharged home on 3/11/2024.    Today patient presents with his spouse.  They report to me no recurrent stroke symptoms since last seen.  He has had overall improvement in his left-sided weakness.  However patient reports continued issues with generalized fatigue that worsens approximately at 2 PM daily.  He has not gone back to work yet.  He has tolerated the Eliquis and atorvastatin well.  He has seen cardiology.  Physical therapy has released him after 1 evaluation.    Patient Active Problem List   Diagnosis    Lichen planus    Laryngopharyngeal reflux    HTN (hypertension), benign    Barbosa's esophagus without dysplasia    Controlled type 2  diabetes mellitus without complication, without long-term current use of insulin    Hx of colonic polyps    Gastroesophageal reflux disease with esophagitis    Acute right frontal stroke    TIA (transient ischemic attack)    Atrial fibrillation, new finding    Mixed hyperlipidemia        Past Medical History:   Diagnosis Date    Basal cell carcinoma     Hyperlipidemia     Hypertension     Kidney stone     Stroke 3/10/2024        Social History     Socioeconomic History    Marital status:    Tobacco Use    Smoking status: Never    Smokeless tobacco: Former     Types: Chew   Vaping Use    Vaping status: Never Used   Substance and Sexual Activity    Alcohol use: No    Drug use: No    Sexual activity: Defer        No Known Allergies       Current Outpatient Medications:     amLODIPine (NORVASC) 5 MG tablet, Take 1 tablet by mouth Daily., Disp: , Rfl:     apixaban (ELIQUIS) 5 MG tablet tablet, Take 1 tablet by mouth Every 12 (Twelve) Hours. Indications: Atrial Fibrillation, Disp: 60 tablet, Rfl: 1    atorvastatin (LIPITOR) 80 MG tablet, Take 1 tablet by mouth Every Night., Disp: 30 tablet, Rfl: 1    Ibuprofen-Diphenhydramine Cit (ADVIL PM PO), Take  by mouth., Disp: , Rfl:     insulin glargine (LANTUS, SEMGLEE) 100 UNIT/ML injection, Inject 6 Units under the skin into the appropriate area as directed Daily., Disp: , Rfl:     levothyroxine (SYNTHROID, LEVOTHROID) 25 MCG tablet, Take 1 tablet by mouth Daily., Disp: , Rfl:     loratadine (CLARITIN) 10 MG tablet, Take 1 tablet by mouth Daily., Disp: , Rfl:     metFORMIN (GLUCOPHAGE) 500 MG tablet, Take 2 tablets by mouth 2 (Two) Times a Day With Meals., Disp: , Rfl:     metoprolol succinate XL (TOPROL-XL) 100 MG 24 hr tablet, Take 1 tablet by mouth Daily., Disp: , Rfl:     olmesartan-hydrochlorothiazide (BENICAR HCT) 40-25 MG per tablet, Take 1 tablet by mouth Daily., Disp: , Rfl:     oxymetazoline (AFRIN) 0.05 % nasal spray, 2 sprays into the nostril(s) as directed  "by provider 2 (Two) Times a Day., Disp: , Rfl:     pantoprazole (PROTONIX) 40 MG EC tablet, Take 1 tablet by mouth Daily., Disp: , Rfl:        Objective   Vital Signs:   /72 (BP Location: Left arm, Patient Position: Sitting)   Pulse 74   Resp 18   Ht 175.3 cm (69\")   Wt 106 kg (234 lb)   BMI 34.56 kg/m²       Physical Exam  Vitals and nursing note reviewed.   Constitutional:       Appearance: Normal appearance.   HENT:      Head: Normocephalic.   Eyes:      General: Lids are normal.      Extraocular Movements: Extraocular movements intact.      Pupils: Pupils are equal, round, and reactive to light.   Neck:      Vascular: No carotid bruit.   Cardiovascular:      Rate and Rhythm: Normal rate and regular rhythm.   Pulmonary:      Effort: Pulmonary effort is normal.   Skin:     General: Skin is warm and dry.   Neurological:      Mental Status: He is alert.      Deep Tendon Reflexes: Reflexes are normal and symmetric.   Psychiatric:         Speech: Speech normal.        Neurological Exam  Mental Status  Alert. Oriented to person, place, time and situation. Speech is normal. Language is fluent with no aphasia.    Cranial Nerves  CN II: Visual fields full to confrontation.  CN III, IV, VI: Extraocular movements intact bilaterally. Normal lids and orbits bilaterally. Pupils equal round and reactive to light bilaterally.  CN V: Facial sensation is normal.  CN VII: Full and symmetric facial movement.  CN IX, X: Palate elevates symmetrically. Normal gag reflex.  CN XI: Shoulder shrug strength is normal.  CN XII: Tongue midline without atrophy or fasciculations.    Motor  Normal muscle bulk throughout. No fasciculations present. Normal muscle tone. No abnormal involuntary movements.                                               Right                     Left  Elbow flexion                         5                          5  Elbow extension                    5                          5  Supination                 "             5                          5  Pronation                               5                          5  Thumb abduction                   5                          5-  Hip flexion                              5                          5  Knee flexion                           5                          5  Knee extension                      5                          5  Dorsiflexion                            5                          5    Sensory  Light touch is normal in upper and lower extremities.     Reflexes  Deep tendon reflexes are 2+ and symmetric in all four extremities.    Coordination  Right: Finger-to-nose normal.Left: Finger-to-nose normal.    Gait  Casual gait is normal including stance, stride, and arm swing.      Result Review :   The following data was reviewed by: YANNI Gamez on 04/11/2024:  CMP          3/10/2024    04:17 3/11/2024    04:44   CMP   Glucose 286  201    BUN 16  14    Creatinine 0.93  0.93    EGFR 88.9  88.9    Sodium 137  141    Potassium 4.4  3.5    Chloride 101  102    Calcium 9.2  8.7    Total Protein  6.9    Albumin  4.1    Globulin  2.8    Total Bilirubin  0.6    Alkaline Phosphatase  60    AST (SGOT)  17    ALT (SGPT)  28    Albumin/Globulin Ratio  1.5    BUN/Creatinine Ratio 17.2  15.1    Anion Gap 12.0  12.0      CBC w/diff          3/10/2024    04:17 3/11/2024    04:44   CBC w/Diff   WBC 6.54  8.30    RBC 4.98  5.15    Hemoglobin 15.5  15.9    Hematocrit 43.1  45.8    MCV 86.5  88.9    MCH 31.1  30.9    MCHC 36.0  34.7    RDW 11.6  11.9    Platelets 160  172    Neutrophil Rel % 62.2     Immature Granulocyte Rel % 0.5     Lymphocyte Rel % 25.2     Monocyte Rel % 9.9     Eosinophil Rel % 1.7     Basophil Rel % 0.5       Lipid Panel          3/10/2024    04:17   Lipid Panel   Total Cholesterol 165    Triglycerides 148    HDL Cholesterol 31    VLDL Cholesterol 27    LDL Cholesterol  107     111    LDL/HDL Ratio 3.37        Most Recent A1C           3/10/2024    04:17   HGBA1C Most Recent   Hemoglobin A1C 8.70      CT Head Without Contrast (03/10/2024 05:08)    Study Result    Narrative & Impression   EXAMINATION:  CT HEAD WO CONTRAST-  3/10/2024 4:05 AM     HISTORY: Stroke work-up. Left-sided facial drooping. Slurred speech.     TECHNIQUE: Multiple axial images were obtained through the brain without  contrast infusion. Multiplanar images were reconstructed.     DLP: 727.98 mGy.cm Automated dosage reduction technique was utilized to  reduce patient dosage.     COMPARISON: No comparison study.     FINDINGS: There are no hemorrhage, edema or mass effect. There is  minimal atrophy and mild prominence of the lateral ventricles. The  visualized paranasal sinuses are clear. The mastoid air cells are clear.  No calvarial fracture is seen.        IMPRESSION:  1. No hemorrhage, edema or mass effect.  2. Minimal atrophy with associated prominence of the lateral ventricles.        This report was signed and finalized on 3/10/2024 8:00 AM by Dr. Joey Lisa MD.     MRI Brain Without Contrast (03/10/2024 12:06)  Study Result    Narrative & Impression   EXAMINATION:  MRI BRAIN WO CONTRAST-  3/10/2024 10:32 AM     HISTORY: Stroke, follow up; R29.810-Facial weakness; R29.90-Unspecified  symptoms and signs involving the nervous system; I48.91-Unspecified  atrial fibrillation; Z74.09-Other reduced mobility; R13.12-Dysphagia,  oropharyngeal phase     TECHNIQUE: Multiplanar imaging was performed in a high field magnet.     COMPARISON: No comparison study.     FINDINGS: There is an acute infarct involving the right caudate nucleus  and the body and right putamen. There is diffusion restriction on the  ADC map images. There is only limited edema in this area on T2 images.  There is no mass effect or midline shift. The ventricular system is  nondilated. There are a few scattered areas of T2 high signal within the  hemispheric white matter. No mass lesions are appreciated. There  is  minimal atrophy. Mild prominence of the lateral ventricles is likely  related.        IMPRESSION:  1. Acute infarct involving the right caudate nucleus and body and right  putamen.  2. Minimal chronic ischemic small vessel white matter disease.  3. Mild atrophy with associated prominence of the lateral ventricles.           This report was signed and finalized on 3/10/2024 1:16 PM by Dr. Joey Lisa MD.     CT CEREBRAL PERFUSION WITH & WITHOUT CONTRAST (03/10/2024 12:41)    Study Result    Narrative & Impression   EXAMINATION:  CT CEREBRAL PERFUSION W WO CONTRAST-  3/10/2024 11:17 AM     HISTORY: Stroke, follow up.     TECHNIQUE:     1. Perfusion CT is performed to acquire images tracking the temporal  course IV of iodinated contrast material passing through the cerebral  circulation. Perfusion parameters, such as cerebral blood flow (CBF),  cerebral blood volume (CBV), mean transit time (MTT), etc. are  calculated by [employer] with additional provided perfusion maps and  estimated stroke volumes.  2. Automated exposure control (AEC) protocols are utilized on the  scanner to ensure dose lowered technique.     DLP: 1553.05 mGy.cm     COMPARISON without contrast: Noncontrast CT brain and brain angiogram  dated 3/10/2024 11:17 AM     FINDINGS:     There is abnormal perfusion involving the right caudate nucleus and body  in right putamen. Perfusion abnormality demonstrated in the right  cerebellum is likely an artifact as there is no abnormality on MRI in  this area.     Distribution: Perfusion abnormality involves the Right anterior cerebral  artery vascular distribution. The lateral aspect of the putamen is also  supplied by right MCA vessels.     Tmax >6.0 seconds volume: 9 ml     CBF < 30% volume: 0 ml     Mismatch volume: 9 ml     Mismatch ratio: Infinite.     IMPRESSION:  Impression:  1. Acute perfusion abnormality involving the right caudate nucleus and  body and right putamen, as described.  2. Perfusion  abnormality depicted over the right cerebellum is likely  artifactual as there is no abnormality identified in the right  cerebellum on the MRI.     This report was signed and finalized on 3/10/2024 1:23 PM by Dr. Joey Lisa MD.     CT Angiogram Head w AI Analysis of LVO (03/10/2024 12:41)    Study Result    Narrative & Impression   EXAMINATION:  CT ANGIOGRAM HEAD W AI ANALYSIS OF LVO-  3/10/2024 11:17  AM     HISTORY: Stroke, follow up; R29.810-Facial weakness; R29.90-Unspecified  symptoms and signs involving the nervous system; I48.91-Unspecified  atrial fibrillation; Z74.09-Other reduced mobility; R13.12-Dysphagia,  oropharyngeal phase.     COMPARISON : No comparison study.     DLP: 380.93 mGy.cm Automated dosage reduction technique was utilized.     TECHNIQUE: CT angio was performed of the brain with IV contrast.  Coronal, sagittal and 3-D images were reconstructed.     INDEPENDENT 3-D WORKSTATION UTILIZED FOR RECONSTRUCTION: YES. A  RADIOLOGIST WAS NOT PRESENT IN THE DEPARTMENT.     FINDINGS: The vertebral and basilar arteries are patent. The right  vertebral artery is small. The internal carotid arteries are patent. The  anterior, middle and posterior cerebral arteries are patent. No  aneurysms are appreciated. The visualized venous sinuses are patent.     AI ANALYSIS: AI analysis demonstrates relative decreased density of  right middle cerebral artery branches. This is reduced to 60-75% of the  density on the left side. I do not see a large vessel occlusion as a  cause. The right internal carotid artery is slightly smaller in caliber  compared to the left internal carotid artery.        IMPRESSION:  1. No large vessel occlusion is seen. No significant carotid artery  plaque is seen intracranially.  2. AI analysis demonstrates relative decreased density of right middle  cerebral artery branches reduced to 60-75% compared to the vessels on  the left. There is relative decreased size of the right internal  carotid  artery compared to the left which may account for this finding.     This report was signed and finalized on 3/10/2024 3:05 PM by Dr. Joey Lisa MD.     CT Angiogram Carotids (03/10/2024 12:41)  Study Result    Narrative & Impression   EXAMINATION:  CT ANGIOGRAM CAROTIDS-  3/10/2024 11:17 AM     HISTORY: Stroke, follow up; R29.810-Facial weakness; R29.90-Unspecified  symptoms and signs involving the nervous system; I48.91-Unspecified  atrial fibrillation; Z74.09-Other reduced mobility; R13.12-Dysphagia,  oropharyngeal phase.     COMPARISON : No comparison study.     DLP: 380.93 mGy.cm Automated dosage reduction technique was utilized to  reduce patient dosage.     TECHNIQUE: CT angio was performed of the neck with IV contrast. Coronal,  sagittal and 3-D reconstruction were performed.     INDEPENDENT 3-D WORKSTATION UTILIZED FOR RECONSTRUCTION: No.     VERTEBRAL ARTERIES: The right vertebral artery is very small in caliber.  It is partially obscured in the mid to lower neck due to severe streak  artifact related to cervical spine hardware. The left vertebral artery  is also partially obscured. The left vertebral artery is dominant. The  left vertebral artery originates from the aortic arch. The origin of the  right vertebral artery has a normal appearance.     RIGHT CAROTID ARTERY: There may be very minimal soft plaque in the right  carotid bifurcation. The right carotid vessels in the neck are uniformly  slightly smaller than the left side which is likely developmental. No  flow-limiting right carotid stenosis is seen.     LEFT CAROTID ARTERY: There is no left carotid plaque or stenosis.     OTHER FINDINGS: The parotid and submandibular glands are symmetric. The  thyroid gland is unremarkable. There is no mass identified in the neck.  There are no enlarged lymph nodes. The lung apices are clear.        IMPRESSION:  1. NASCET criteria utilized.  2. The vertebral arteries are patent in the neck  bilaterally without  plaque or stenosis. The left vertebral artery is dominant and originates  from the aortic arch. A portion of both vertebral arteries are obscured  due to streak artifact in the neck related to hardware.  3. There appears to be mild noncalcified plaque in the right carotid  bifurcation that does not cause any luminal narrowing. The right carotid  vessels are all slightly smaller in size compared to the left carotid  vessels which I think is a developmental variant.     This report was signed and finalized on 3/10/2024 3:10 PM by Dr. Joey Lisa MD.     ECG 12 Lead Stroke Evaluation (03/10/2024 04:13)    Adult Transthoracic Echo Complete W/ Cont if Necessary Per Protocol (With Agitated Saline) (03/10/2024 14:35)    Interpretation Summary         Left ventricular systolic function is normal. Left ventricular ejection fraction appears to be 61 - 65%.    Left ventricular wall thickness is consistent with mild concentric hypertrophy    Left ventricular diastolic function was normal.    Normal right ventricular cavity size and systolic function noted.    Saline test results are negative for right to left atrial level shunt.    There is mild calcification of the aortic valve. There is mild to moderate thickening of the aortic valve. No aortic valve regurgitation is present. No hemodynamically significant aortic valve stenosis is present.    ED Provider Notes by Grady Pacheco MD (03/10/2024 04:29)  Consults by Thang Moreira MD (03/10/2024 09:11)  H&P by Stoney Chavez MD (03/10/2024 07:12)  Discharge Summary by Yarelis Yip APRN (03/11/2024 08:28)    Office Visit with Noreen Simmons CCC-SLP (03/26/2024)     Office Visit with Shailesh Ta DO (03/25/2024)                     Impression:  Rikki Amaya is a 68 y.o. male who presents for follow-up of stroke.  Etiology behind stroke likely cardioembolic due to new onset atrial fibrillation.  He does  have additional risk factors for stroke to include hypertension, hyperlipidemia, uncontrolled diabetes and obesity.  I do feel his generalized fatigue is likely related to stroke as well as cardiac medications for rate control.  He has very minimal focal weakness.  He does have concerns about going back to work due to fatigue.  I would recommend continuing secondary stroke preventative strategies.    Diagnoses and all orders for this visit:    1. History of stroke with residual deficit (Primary)    2. Left-sided weakness    3. Paroxysmal atrial fibrillation    4. Chronic anticoagulation    5. Hyperlipidemia LDL goal <70    6. Type 2 diabetes mellitus with hyperglycemia, with long-term current use of insulin    7. HTN (hypertension), benign    8. Obesity (BMI 30.0-34.9)        Plan:  Continue apixaban 5 mg twice daily for stroke prophylaxis in the setting of atrial fibrillation  Continue atorvastatin 80 mg daily for hyperlipidemia  LDL goal less than 70.  Currently 111.  A1c goal less than 7.0.  Currently 8.7.  BP goal less than 130/80.  Currently 140/72.  Recommend Mediterranean-style diet  Recommend increasing activity as tolerated  Return to the emergency room for any new stroke symptoms.  Would recommend patient returning to work if possible in a light-duty environment.  I do have concerns with him experiencing fatigue midday.  He does work at a safety sensitive position as a  at [employer]  Follow-up with PCP as scheduled  Follow-up with cardiology as scheduled  Follow-up in our clinic in 3 months or sooner if needed    The patient and I have discussed the plan of care and he is in full agreement at this time.   I spent a total of 58 minutes for this encounter.  Includes reviewing prior records, obtaining a thorough HPI, assessment of patient, developing a plan of care with the patient and his spouse, extensive patient spouse discussion, extensive patient and spouse  education as well as documentation.  Follow Up   Return in about 3 months (around 7/11/2024) for Stroke.            Peter Gupta, YANNI  04/11/24  13:04 CDT

## 2024-05-08 ENCOUNTER — OFFICE VISIT (OUTPATIENT)
Dept: CARDIOLOGY | Facility: CLINIC | Age: 69
End: 2024-05-08
Payer: COMMERCIAL

## 2024-05-08 VITALS
WEIGHT: 233 LBS | BODY MASS INDEX: 34.51 KG/M2 | HEIGHT: 69 IN | DIASTOLIC BLOOD PRESSURE: 78 MMHG | SYSTOLIC BLOOD PRESSURE: 130 MMHG | OXYGEN SATURATION: 98 % | HEART RATE: 61 BPM

## 2024-05-08 DIAGNOSIS — E78.2 MIXED HYPERLIPIDEMIA: ICD-10-CM

## 2024-05-08 DIAGNOSIS — I10 HTN (HYPERTENSION), BENIGN: Primary | ICD-10-CM

## 2024-05-08 DIAGNOSIS — E11.9 CONTROLLED TYPE 2 DIABETES MELLITUS WITHOUT COMPLICATION, WITHOUT LONG-TERM CURRENT USE OF INSULIN: ICD-10-CM

## 2024-05-08 DIAGNOSIS — G45.9 TIA (TRANSIENT ISCHEMIC ATTACK): ICD-10-CM

## 2024-05-08 RX ORDER — FLECAINIDE ACETATE 50 MG/1
50 TABLET ORAL 2 TIMES DAILY
Qty: 180 TABLET | Refills: 3 | Status: SHIPPED | OUTPATIENT
Start: 2024-05-08

## 2024-05-16 ENCOUNTER — TELEPHONE (OUTPATIENT)
Dept: CARDIOLOGY | Facility: CLINIC | Age: 69
End: 2024-05-16
Payer: COMMERCIAL

## 2024-05-16 NOTE — TELEPHONE ENCOUNTER
CALL PT ABOUT PAPER WORK FOR STD, PT STATES, DR. LEONARDO AND DR. OGDEN HAS HIM OFF WORK, OUR LAST OV DOES NOT STATE ANY RESTRICTIONS.      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    PLEASE ADVISE    Escalate care to CTU  Mechanical ventilation  STAT CXR  STAT lab work  blood cultures  STAT CT Head when stable  Neurology consult

## 2024-05-22 ENCOUNTER — TELEPHONE (OUTPATIENT)
Dept: CARDIOLOGY | Facility: CLINIC | Age: 69
End: 2024-05-22

## 2024-05-22 NOTE — TELEPHONE ENCOUNTER
The [facility] received a fax that requires your attention. The document has been indexed to the patient’s chart for your review.      Reason for sending: EXTERNAL MEDICAL RECORD NOTIFICATION     Documents Description: DISABILITY/FMLA; STD PAPERWORK-[employer]-5.21.24    Name of Sender: [employer]    Date Indexed: 5.21.24

## 2024-05-29 ENCOUNTER — TELEPHONE (OUTPATIENT)
Dept: CARDIOLOGY | Facility: CLINIC | Age: 69
End: 2024-05-29

## 2024-05-29 NOTE — TELEPHONE ENCOUNTER
The [facility] received a fax that requires your attention. The document has been indexed to the patient’s chart for your review.      Reason for sending: EXTERNAL MEDICAL RECORD NOTIFICATION     Documents Description: DISABILITY/FMLA; STD PAPERWORK-[employer]-5.28.24    Name of Sender: [employer]     Date Indexed: 5.28.24

## 2024-06-03 ENCOUNTER — TELEPHONE (OUTPATIENT)
Dept: CARDIOLOGY | Facility: CLINIC | Age: 69
End: 2024-06-03

## 2024-06-03 NOTE — TELEPHONE ENCOUNTER
The [facility] received a fax that requires your attention. The document has been indexed to the patient’s chart for your review.      Reason for sending: EXTERNAL MEDICAL RECORD NOTIFICATION     Documents Description: FMLA/DISABILITY; STD PAPERWORK-[employer]-6.1.24    Name of Sender: [employer]     Date Indexed: 6.1.24

## 2024-07-12 ENCOUNTER — OFFICE VISIT (OUTPATIENT)
Dept: NEUROLOGY | Facility: CLINIC | Age: 69
End: 2024-07-12
Payer: COMMERCIAL

## 2024-07-12 VITALS
BODY MASS INDEX: 34.51 KG/M2 | HEART RATE: 62 BPM | SYSTOLIC BLOOD PRESSURE: 122 MMHG | DIASTOLIC BLOOD PRESSURE: 60 MMHG | WEIGHT: 233 LBS | RESPIRATION RATE: 18 BRPM | HEIGHT: 69 IN

## 2024-07-12 DIAGNOSIS — Z79.01 CHRONIC ANTICOAGULATION: ICD-10-CM

## 2024-07-12 DIAGNOSIS — R42 ORTHOSTATIC DIZZINESS: ICD-10-CM

## 2024-07-12 DIAGNOSIS — R53.1 LEFT-SIDED WEAKNESS: ICD-10-CM

## 2024-07-12 DIAGNOSIS — F06.31 DEPRESSION DUE TO OLD STROKE: ICD-10-CM

## 2024-07-12 DIAGNOSIS — I69.398 DEPRESSION DUE TO OLD STROKE: ICD-10-CM

## 2024-07-12 DIAGNOSIS — I10 HTN (HYPERTENSION), BENIGN: ICD-10-CM

## 2024-07-12 DIAGNOSIS — R13.10 DYSPHAGIA, UNSPECIFIED TYPE: ICD-10-CM

## 2024-07-12 DIAGNOSIS — I48.0 PAROXYSMAL ATRIAL FIBRILLATION: ICD-10-CM

## 2024-07-12 DIAGNOSIS — I69.30 HISTORY OF STROKE WITH RESIDUAL DEFICIT: Primary | ICD-10-CM

## 2024-07-12 DIAGNOSIS — Z79.4 TYPE 2 DIABETES MELLITUS WITH HYPERGLYCEMIA, WITH LONG-TERM CURRENT USE OF INSULIN: ICD-10-CM

## 2024-07-12 DIAGNOSIS — E66.9 OBESITY (BMI 30.0-34.9): ICD-10-CM

## 2024-07-12 DIAGNOSIS — E78.5 HYPERLIPIDEMIA LDL GOAL <70: ICD-10-CM

## 2024-07-12 DIAGNOSIS — E11.65 TYPE 2 DIABETES MELLITUS WITH HYPERGLYCEMIA, WITH LONG-TERM CURRENT USE OF INSULIN: ICD-10-CM

## 2024-07-12 RX ORDER — ERGOCALCIFEROL 1.25 MG/1
50000 CAPSULE ORAL WEEKLY
COMMUNITY

## 2024-07-12 RX ORDER — ESCITALOPRAM OXALATE 5 MG/1
10 TABLET ORAL DAILY
Qty: 30 TABLET | Refills: 1 | Status: SHIPPED | OUTPATIENT
Start: 2024-07-12

## 2024-07-12 NOTE — PROGRESS NOTES
Neurology Consult Note    [facility]  [address], [address]  [place] [address]  Phone: [phone]  Fax: [phone]    Referring Provider:   No ref. provider found  Primary Care Provider:  Chito Finley MD    Reason for Consult:  Stroke  Subjective      Rikki Amaya presents to [facility]    History of Present Illness  68-year-old male seen for follow-up of stroke.  Last seen in clinic on 4/11/2024.  Patient was continued on apixaban 5 mg twice daily for stroke prophylaxis in the setting of atrial fibrillation as well as continued on atorvastatin 80 mg daily for hyperlipidemia.  Patient did complain of generalized fatigue after his stroke.    Today patient presents with his spouse.  Reports to me since last being seen no new stroke symptoms.  He does continue with minimal left-sided weakness as well as dysphagia.  Currently he is on a regular diet at home.  He also notes dizziness worse with position changes.  Additionally he gets fatigued very easily.  He also reports being foggy headed.  He did have an overnight pulse oximetry study performed which supposedly showed abnormalities.  He is set to have a polysomnography test performed on July 22.  He is maintained on Eliquis 5 mg twice daily as well as atorvastatin 80 mg nightly.  Spouse reports patient has been sedentary.  He does not do much activity at home.  He is still off of work per his PCP.  Patient reports he has been down lately.  Reportedly his brother is going through cancer and stroke treatments as well.    Patient Active Problem List   Diagnosis    Lichen planus    Laryngopharyngeal reflux    HTN (hypertension), benign    Barbosa's esophagus without dysplasia    Controlled type 2 diabetes mellitus without complication, without long-term current use of insulin    Hx of colonic polyps    Gastroesophageal reflux disease with esophagitis    Acute right frontal stroke    TIA (transient  ischemic attack)    Atrial fibrillation, new finding    Mixed hyperlipidemia        Past Medical History:   Diagnosis Date    Basal cell carcinoma     Hyperlipidemia     Hypertension     Kidney stone     Stroke 3/10/2024        Social History     Socioeconomic History    Marital status:    Tobacco Use    Smoking status: Never    Smokeless tobacco: Former     Types: Chew   Vaping Use    Vaping status: Never Used   Substance and Sexual Activity    Alcohol use: No    Drug use: No    Sexual activity: Defer        No Known Allergies       Current Outpatient Medications:     amLODIPine (NORVASC) 5 MG tablet, Take 1 tablet by mouth Daily., Disp: , Rfl:     apixaban (ELIQUIS) 5 MG tablet tablet, Take 1 tablet by mouth Every 12 (Twelve) Hours. Indications: Atrial Fibrillation, Disp: 60 tablet, Rfl: 1    atorvastatin (LIPITOR) 80 MG tablet, Take 1 tablet by mouth Every Night., Disp: 30 tablet, Rfl: 1    flecainide (TAMBOCOR) 50 MG tablet, Take 1 tablet by mouth 2 (Two) Times a Day., Disp: 180 tablet, Rfl: 3    Ibuprofen-Diphenhydramine Cit (ADVIL PM PO), Take  by mouth., Disp: , Rfl:     insulin glargine (LANTUS, SEMGLEE) 100 UNIT/ML injection, Inject 6 Units under the skin into the appropriate area as directed Daily., Disp: , Rfl:     levothyroxine (SYNTHROID, LEVOTHROID) 25 MCG tablet, Take 1 tablet by mouth Daily., Disp: , Rfl:     loratadine (CLARITIN) 10 MG tablet, Take 1 tablet by mouth Daily., Disp: , Rfl:     metFORMIN (GLUCOPHAGE) 500 MG tablet, Take 2 tablets by mouth 2 (Two) Times a Day With Meals., Disp: , Rfl:     metoprolol succinate XL (TOPROL-XL) 100 MG 24 hr tablet, Take 1 tablet by mouth Daily., Disp: , Rfl:     olmesartan-hydrochlorothiazide (BENICAR HCT) 40-25 MG per tablet, Take 1 tablet by mouth Daily., Disp: , Rfl:     oxymetazoline (AFRIN) 0.05 % nasal spray, 2 sprays into the nostril(s) as directed by provider 2 (Two) Times a Day., Disp: , Rfl:     pantoprazole (PROTONIX) 40 MG EC tablet, Take  "1 tablet by mouth Daily., Disp: , Rfl:     vitamin D (ERGOCALCIFEROL) 1.25 MG (61053 UT) capsule capsule, Take 1 capsule by mouth 1 (One) Time Per Week., Disp: , Rfl:     escitalopram (Lexapro) 5 MG tablet, Take 2 tablets by mouth Daily. Start with 1 tablet daily for 2 weeks. May increase to 2 tablets daily after 2 weeks., Disp: 30 tablet, Rfl: 1       Objective   Vital Signs:   /60 (BP Location: Right arm, Patient Position: Standing)   Pulse 62   Resp 18   Ht 175.3 cm (69\")   Wt 106 kg (233 lb)   BMI 34.41 kg/m²       Physical Exam  Vitals and nursing note reviewed.   Constitutional:       Appearance: Normal appearance.   HENT:      Head: Normocephalic.   Eyes:      General: Lids are normal.      Extraocular Movements: Extraocular movements intact.      Pupils: Pupils are equal, round, and reactive to light.   Pulmonary:      Effort: Pulmonary effort is normal. No respiratory distress.   Skin:     General: Skin is warm and dry.   Neurological:      Mental Status: He is alert.      Deep Tendon Reflexes: Reflexes are normal and symmetric.   Psychiatric:         Speech: Speech normal.        Neurological Exam  Mental Status  Alert. Oriented to person, place, time and situation. Speech is normal. Language is fluent with no aphasia.    Cranial Nerves  CN II: Visual fields full to confrontation.  CN III, IV, VI: Extraocular movements intact bilaterally. Normal lids and orbits bilaterally. Pupils equal round and reactive to light bilaterally.  CN V: Facial sensation is normal.  CN VII: Full and symmetric facial movement.  CN IX, X: Palate elevates symmetrically. Normal gag reflex.  CN XI: Shoulder shrug strength is normal.  CN XII: Tongue midline without atrophy or fasciculations.    Motor  Normal muscle bulk throughout. No fasciculations present. Normal muscle tone. No abnormal involuntary movements.                                               Right                     Left  Elbow flexion                     "     5                          5  Elbow extension                    5                          5  Supination                             5                          5  Pronation                               5                          5  Finger abduction                    5                          5-  Thumb abduction                   5                          5-  Hip flexion                              5                          5  Knee flexion                           5                          5  Knee extension                      5                          5  Dorsiflexion                            5                          5    Sensory  Light touch is normal in upper and lower extremities.     Reflexes  Deep tendon reflexes are 2+ and symmetric in all four extremities.    Coordination  Right: Finger-to-nose normal.Left: Finger-to-nose normal.    Gait  Casual gait is normal including stance, stride, and arm swing.      Result Review :   The following data was reviewed by: YANNI Gamez on 07/12/2024:  CMP          3/10/2024    04:17 3/11/2024    04:44   CMP   Glucose 286  201    BUN 16  14    Creatinine 0.93  0.93    EGFR 88.9  88.9    Sodium 137  141    Potassium 4.4  3.5    Chloride 101  102    Calcium 9.2  8.7    Total Protein  6.9    Albumin  4.1    Globulin  2.8    Total Bilirubin  0.6    Alkaline Phosphatase  60    AST (SGOT)  17    ALT (SGPT)  28    Albumin/Globulin Ratio  1.5    BUN/Creatinine Ratio 17.2  15.1    Anion Gap 12.0  12.0      CBC w/diff          3/10/2024    04:17 3/11/2024    04:44   CBC w/Diff   WBC 6.54  8.30    RBC 4.98  5.15    Hemoglobin 15.5  15.9    Hematocrit 43.1  45.8    MCV 86.5  88.9    MCH 31.1  30.9    MCHC 36.0  34.7    RDW 11.6  11.9    Platelets 160  172    Neutrophil Rel % 62.2     Immature Granulocyte Rel % 0.5     Lymphocyte Rel % 25.2     Monocyte Rel % 9.9     Eosinophil Rel % 1.7     Basophil Rel % 0.5       Lipid Panel          3/10/2024    04:17    Lipid Panel   Total Cholesterol 165    Triglycerides 148    HDL Cholesterol 31    VLDL Cholesterol 27    LDL Cholesterol  107     111    LDL/HDL Ratio 3.37        Most Recent A1C          3/10/2024    04:17   HGBA1C Most Recent   Hemoglobin A1C 8.70      MRI Brain Without Contrast (03/10/2024 12:06)   Study Result    Narrative & Impression   EXAMINATION:  MRI BRAIN WO CONTRAST-  3/10/2024 10:32 AM     HISTORY: Stroke, follow up; R29.810-Facial weakness; R29.90-Unspecified  symptoms and signs involving the nervous system; I48.91-Unspecified  atrial fibrillation; Z74.09-Other reduced mobility; R13.12-Dysphagia,  oropharyngeal phase     TECHNIQUE: Multiplanar imaging was performed in a high field magnet.     COMPARISON: No comparison study.     FINDINGS: There is an acute infarct involving the right caudate nucleus  and the body and right putamen. There is diffusion restriction on the  ADC map images. There is only limited edema in this area on T2 images.  There is no mass effect or midline shift. The ventricular system is  nondilated. There are a few scattered areas of T2 high signal within the  hemispheric white matter. No mass lesions are appreciated. There is  minimal atrophy. Mild prominence of the lateral ventricles is likely  related.        IMPRESSION:  1. Acute infarct involving the right caudate nucleus and body and right  putamen.  2. Minimal chronic ischemic small vessel white matter disease.  3. Mild atrophy with associated prominence of the lateral ventricles.           This report was signed and finalized on 3/10/2024 1:16 PM by Dr. Joey Lisa MD.   CT Angiogram Head w AI Analysis of LVO (03/10/2024 12:41)     Study Result    Narrative & Impression   EXAMINATION:  CT ANGIOGRAM HEAD W AI ANALYSIS OF LVO-  3/10/2024 11:17  AM     HISTORY: Stroke, follow up; R29.810-Facial weakness; R29.90-Unspecified  symptoms and signs involving the nervous system; I48.91-Unspecified  atrial fibrillation;  Z74.09-Other reduced mobility; R13.12-Dysphagia,  oropharyngeal phase.     COMPARISON : No comparison study.     DLP: 380.93 mGy.cm Automated dosage reduction technique was utilized.     TECHNIQUE: CT angio was performed of the brain with IV contrast.  Coronal, sagittal and 3-D images were reconstructed.     INDEPENDENT 3-D WORKSTATION UTILIZED FOR RECONSTRUCTION: YES. A  RADIOLOGIST WAS NOT PRESENT IN THE DEPARTMENT.     FINDINGS: The vertebral and basilar arteries are patent. The right  vertebral artery is small. The internal carotid arteries are patent. The  anterior, middle and posterior cerebral arteries are patent. No  aneurysms are appreciated. The visualized venous sinuses are patent.     AI ANALYSIS: AI analysis demonstrates relative decreased density of  right middle cerebral artery branches. This is reduced to 60-75% of the  density on the left side. I do not see a large vessel occlusion as a  cause. The right internal carotid artery is slightly smaller in caliber  compared to the left internal carotid artery.        IMPRESSION:  1. No large vessel occlusion is seen. No significant carotid artery  plaque is seen intracranially.  2. AI analysis demonstrates relative decreased density of right middle  cerebral artery branches reduced to 60-75% compared to the vessels on  the left. There is relative decreased size of the right internal carotid  artery compared to the left which may account for this finding.     This report was signed and finalized on 3/10/2024 3:05 PM by Dr. Joey Lisa MD.     CT Angiogram Carotids (03/10/2024 12:41)   Study Result    Narrative & Impression   EXAMINATION:  CT ANGIOGRAM CAROTIDS-  3/10/2024 11:17 AM     HISTORY: Stroke, follow up; R29.810-Facial weakness; R29.90-Unspecified  symptoms and signs involving the nervous system; I48.91-Unspecified  atrial fibrillation; Z74.09-Other reduced mobility; R13.12-Dysphagia,  oropharyngeal phase.     COMPARISON : No comparison  study.     DLP: 380.93 mGy.cm Automated dosage reduction technique was utilized to  reduce patient dosage.     TECHNIQUE: CT angio was performed of the neck with IV contrast. Coronal,  sagittal and 3-D reconstruction were performed.     INDEPENDENT 3-D WORKSTATION UTILIZED FOR RECONSTRUCTION: No.     VERTEBRAL ARTERIES: The right vertebral artery is very small in caliber.  It is partially obscured in the mid to lower neck due to severe streak  artifact related to cervical spine hardware. The left vertebral artery  is also partially obscured. The left vertebral artery is dominant. The  left vertebral artery originates from the aortic arch. The origin of the  right vertebral artery has a normal appearance.     RIGHT CAROTID ARTERY: There may be very minimal soft plaque in the right  carotid bifurcation. The right carotid vessels in the neck are uniformly  slightly smaller than the left side which is likely developmental. No  flow-limiting right carotid stenosis is seen.     LEFT CAROTID ARTERY: There is no left carotid plaque or stenosis.     OTHER FINDINGS: The parotid and submandibular glands are symmetric. The  thyroid gland is unremarkable. There is no mass identified in the neck.  There are no enlarged lymph nodes. The lung apices are clear.        IMPRESSION:  1. NASCET criteria utilized.  2. The vertebral arteries are patent in the neck bilaterally without  plaque or stenosis. The left vertebral artery is dominant and originates  from the aortic arch. A portion of both vertebral arteries are obscured  due to streak artifact in the neck related to hardware.  3. There appears to be mild noncalcified plaque in the right carotid  bifurcation that does not cause any luminal narrowing. The right carotid  vessels are all slightly smaller in size compared to the left carotid  vessels which I think is a developmental variant.     This report was signed and finalized on 3/10/2024 3:10 PM by Dr. Joey Lisa MD.      Progress Notes by Peter Gupta APRN (04/11/2024 11:30)                       Impression:  Rikki Amaya is a 68 y.o. male who presents for follow-up of stroke.  Etiology felt to be cardioembolic from new onset atrial fibrillation.  Patient will be maintained on Eliquis 5 mg twice daily as well as atorvastatin 80 mg nightly for stroke prophylaxis.  Additionally today in discussion with patient as well as overall demeanor, I am concerned about postop depression.  Patient spouse reported limited motivation from the patient.  He is sedentary at this point.  I would recommend trialing low-dose Lexapro to see if we can achieve any benefit.  Additionally recommended patient to increase activities.  I do feel like his dizziness that is worsened with position changes likely related to deconditioning as well as his chronic medications.  He is on a diuretic, antihypertensive as well as antiarrhythmic.    Diagnoses and all orders for this visit:    1. History of stroke with residual deficit (Primary)  -     Ambulatory Referral to Speech Therapy  -     escitalopram (Lexapro) 5 MG tablet; Take 2 tablets by mouth Daily. Start with 1 tablet daily for 2 weeks. May increase to 2 tablets daily after 2 weeks.  Dispense: 30 tablet; Refill: 1    2. Dysphagia, unspecified type  -     Ambulatory Referral to Speech Therapy    3. 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 Start with 1 tablet daily for 2 weeks. May increase to 2 tablets daily after 2 weeks.  Dispense: 30 tablet; Refill: 1    4. Left-sided weakness    5. Paroxysmal atrial fibrillation    6. Chronic anticoagulation    7. Hyperlipidemia LDL goal <70    8. Type 2 diabetes mellitus with hyperglycemia, with long-term current use of insulin    9. HTN (hypertension), benign    10. Obesity (BMI 30.0-34.9)    11. Orthostatic dizziness        Plan:  Continue apixaban 5 mg twice daily  Continue atorvastatin 80 mg  nightly  Start escitalopram 5 mg tablet.  Take 1 tablet nightly for 2 weeks then increase to 2 tablets after 2 weeks  Referral to speech therapy due to dysphagia  LDL goal less than 70  A1c goal less than 7.0  BP goal less than 130/80.  Currently at goal 122/60 in clinic.  Increase activity to include at least moderate intensity exercise 3 times a week at 20-minute intervals  Return to the emergency room for any new stroke symptoms.  Reviewed be-fast.  Mediterranean-style diet  Follow-up with PCP as scheduled  Follow-up with cardiology as scheduled  Follow-up in our clinic in approximately 8 weeks    The patient and I have discussed the plan of care and he is in full agreement at this time.   I spent a total of 40 minutes for this encounter today.  This includes reviewing prior records, obtaining a thorough HPI, assessment of patient, developing a plan of care with the patient and his spouse, extensive patient spouse discussion, extensive patient spouse education, medication management as well as documentation.  Follow Up   Return in about 8 weeks (around 9/6/2024) for Stroke.            YANNI Gamez  07/12/24  12:25 CDT

## 2024-07-19 ENCOUNTER — OFFICE VISIT (OUTPATIENT)
Dept: PHYSICAL THERAPY | Facility: CLINIC | Age: 69
End: 2024-07-19
Payer: COMMERCIAL

## 2024-07-19 DIAGNOSIS — R13.10 DYSPHAGIA, UNSPECIFIED TYPE: Primary | ICD-10-CM

## 2024-07-19 PROCEDURE — 92610 EVALUATE SWALLOWING FUNCTION: CPT | Performed by: SPEECH-LANGUAGE PATHOLOGIST

## 2024-07-22 ENCOUNTER — HOSPITAL ENCOUNTER (OUTPATIENT)
Dept: SLEEP CENTER | Age: 69
Discharge: HOME | End: 2024-07-24
Payer: COMMERCIAL

## 2024-07-22 DIAGNOSIS — I69.30 HISTORY OF STROKE WITH RESIDUAL DEFICIT: ICD-10-CM

## 2024-07-22 DIAGNOSIS — R13.10 DYSPHAGIA, UNSPECIFIED TYPE: Primary | ICD-10-CM

## 2024-07-22 PROCEDURE — G0399 HOME SLEEP TEST/TYPE 3 PORTA: HCPCS

## 2024-07-22 NOTE — PROGRESS NOTES
Speech/Language Pathology Initial Evaluation and Plan of Care  [address] [place] [address]    Patient: Rikki Amaya               : 1955  Visit Date: 2024  Referring practitioner: YANNI Gamez  Date of Initial Visit: 2024  Patient seen for 2 sessions    Visit Diagnoses:    ICD-10-CM ICD-9-CM   1. Dysphagia, unspecified type  R13.10 787.20     Past Medical History:   Diagnosis Date    Basal cell carcinoma     Hyperlipidemia     Hypertension     Kidney stone     Stroke 3/10/2024     Past Surgical History:   Procedure Laterality Date    COLONOSCOPY  10/30/2015    Normal exam repeat in 5 years    COLONOSCOPY N/A 2022    Procedure: COLONOSCOPY WITH ANESTHESIA;  Surgeon: Kelvin Mello MD;  Location: [facility] ENDOSCOPY;  Service: Gastroenterology;  Laterality: N/A;  pre: hx polyps  post: polyps  Chito Finley MD     ENDOSCOPY  10/30/2015    Fundic glad polyp stomach    ENDOSCOPY N/A 2022    Procedure: ESOPHAGOGASTRODUODENOSCOPY WITH ANESTHESIA;  Surgeon: Kelvin Mello MD;  Location: [facility] ENDOSCOPY;  Service: Gastroenterology;  Laterality: N/A;  pre: reflux  post: gastric polyps. dilation.   Chito Finley MD     GANGLION CYST EXCISION      KNEE ARTHROPLASTY      NECK SURGERY      SHOULDER SURGERY      SKIN CANCER EXCISION      UMBILICAL HERNIA REPAIR         SUBJECTIVE     Subjective: Patient was seen today for swallow evaluation. He feels that his swallow has not gotten better from his stroke and wishes to proceed with evaluation and therapy.   Patient presents with the following symptoms: coughing, difficulty swallowing solids, difficulty swallowing liquids, difficulty swallowing pills, food getting stuck, globus, and esophageal symptoms   Date of Onset: March 10, 2024  History of present problems: Patient had a stroke on March 10, 2024. He was assessed for aphasia with score WNL. He did not feel  he was having significant difficulty in any area at that time. He wanted to wait and see what resolved on its own. He has had neck and shoulder surgery as well as upper endoscopy.   The functional impact on the patient: Difficulty swallowing, risk of aspiration.    Prior level of function: WFL  Pertinent Medical History Related to this Problem: CVA and GERD  Current Diet Level:   Solid: 7 -  Easy to Chew/Regular  Liquid: 0 - Thin  Non-oral Feeding: N/A    OBJECTIVE     CLINICAL OBSERVATIONS  Respiratory/Swallow Coordination: Mildly impaired  Position During Evaluation: Upright  Anatomy/Physiology: Patient presents with labial droop, lingual deviation, palatal asymmetry.   Dentition: Patient has own teeth and Patient is partially endentulous  Oral Health: Oral cavity is clean  Awareness/Control of Secretions: Patient swallows saliva    Method of Food Administration: Cup with thin water  Pharyngeal Symptoms: multiple swallows with consistencies of thin water and throat clearing with consistencies of thin water      INSTRUMENTAL ASSESSMENT  Instrumental Exam Completed?: No. VFSS Warranted.  (if yes list date and location)    IMPRESSION/RECOMMENDATIONS  Factors Affecting Performance: no difficulty participating in study  Learning Motivation: strong  Education/Learning Comments: Patient demonstrated understanding of evaluation results and plan of care.     PATIENT SELF ASSESSMENT    EAT 10  0= No problem 4= Severe problem  My swallowing has caused me to lose weight 0   My Swallowing problem interferes with my ability to go out for meals  0   Swallowing liquids takes extra effort 2   Swallowing solids takes extra effort 2   Swallowing pills takes extra effort 2   Swallowing is painful 0   The pleasure of eating is affected by my swallowing 2   When I swallow, food sticks in my throat 3   I cough when I eat 2   Swallowing is stressful 2                                                                                                   Total Score 15     0-9 Minimal  10-19 Mild  20-29 Moderate 30-40 Severe      Clinical Impression:   Mild: oral phase dysphagia and Mild/Moderate: pharyngeal signs and symptoms observed  Impact on Function: risk for inadequate nutrition, inadequate hydration, aspiration, pneumonia, and social aspects of eating  Prognosis Comment: Pending VFSS results      Therapy Education/Self Care    Details: Evaluation results and POC   Given    Progress: New   Education provided to:  Patient and Spouse/SO   Level of understanding Verbalized           Total Time of Visit:            60   mins    ASSESSMENT/PLAN     Goals                                            STG  Comments Status   Patient will improve oral skills to enhance safety and increase eating efficiency and bolus control as measured by improved bolus formation and improved posterior propulsion of the bolus.  New   Pharyngeal goals pending VFSS results     Patient will report improved EAT-10 score. EAT 10 score today 15/Moderate New   Patient will improve oral hygiene to enhance safety and decrease risk of aspiration pneumonia  New   LTG      Patient will safely consume full PO diet of regular consistency food and thin liquids without discomfort, globus, and difficulty or complications such as aspiration or pneumonia.   New     ASSESSMENT:   Patient is indicated for skilled care by a Speech/Language Pathologist.     Summary of Assessment: Patient presents with mild oral dysphagia and suspected pharyngeal dysphagia. VFSS Warranted.     Recommendations for Diet/Nutrition: aggressive oral care and full oral diet  Swallowing Precautions: upright position for at least 30 minutes after meals, small sips and bites when eating, and slow rate; empty mouth between bites  Therapy Recommendations: dysphagia therapy to address swallowing deficits and Videofluoroscopic Swallowing Study (VFSS)      PLAN:  Details of Plan of Care: Therapy pending VFSS results.     Frequency:  1 time per week  Duration: 12 visits  Estimated Length of Session: 30 minutes    SPEECH/LANGUAGE PATHOLOGIST SIGNATURE: Noreen Simmons CCC-SLP, [place] License #: [id number]  Electronically Signed on 7/22/2024        Initial Certification  Certification Period: 7/22/2024 through 10/19/2024  I certify that the therapy services are furnished while this patient is under my care.  The services outlined above are required by this patient, and will be reviewed every 90 days.     PHYSICIAN: Peter Gupta APRN (NPI: [id number])    Signature:____________________________________________DATE: _________     Please sign and return via fax to [phone].   Thank you so much for letting us work with Rikki. I appreciate your letting us work with your patients. If you have any questions or concerns, please don't hesitate to contact me.          [address] [address]  [phone]

## 2024-07-22 NOTE — PROGRESS NOTES
Ms.Thomas Hernandez presented today in the sleep center for a Home Sleep Test (HST).  Mr. Hernandez was instructed on the device and was requested to wear the unit for two nights. Mr. Hernandez was asked to have the HST monitor back by 10AM on  07/24/2024. The patient acknowledged he understood. The HST device was tested and was in working order.

## 2024-07-23 PROCEDURE — G0399 HOME SLEEP TEST/TYPE 3 PORTA: HCPCS

## 2024-07-31 ENCOUNTER — HOSPITAL ENCOUNTER (OUTPATIENT)
Dept: GENERAL RADIOLOGY | Facility: HOSPITAL | Age: 69
Discharge: HOME | End: 2024-07-31
Payer: COMMERCIAL

## 2024-07-31 DIAGNOSIS — R13.10 DYSPHAGIA, UNSPECIFIED TYPE: ICD-10-CM

## 2024-07-31 DIAGNOSIS — I69.30 HISTORY OF STROKE WITH RESIDUAL DEFICIT: ICD-10-CM

## 2024-07-31 PROCEDURE — 74230 X-RAY XM SWLNG FUNCJ C+: CPT

## 2024-07-31 PROCEDURE — 92611 MOTION FLUOROSCOPY/SWALLOW: CPT

## 2024-07-31 RX ADMIN — BARIUM SULFATE 250 ML: 400 SUSPENSION ORAL at 08:51

## 2024-07-31 RX ADMIN — BARIUM SULFATE 20 ML: 400 PASTE ORAL at 08:51

## 2024-07-31 RX ADMIN — BARIUM SULFATE 55 ML: 0.81 POWDER, FOR SUSPENSION ORAL at 08:51

## 2024-07-31 NOTE — MBS/VFSS/FEES
Speech Language Pathology   [facility] [place] / Discharge Summary  [facility]       Patient Name: Rikki Amaya  : 1955  MRN: [id number]    Today's Date: 2024      Visit Date: 2024   SPEECH-LANGUAGE PATHOLOGY EVALUTION - VFSS  Subjective: The patient was seen on this date for a VFSS(Videofluoroscopic Swallowing Study).  Patient was alert and cooperative.    Significant history: Right CVA 2024, GERD, c/o dysphagia and globus sensation. Hx neck surgery, upper endoscopy and esophageal dilation years ago.   Objective: Risks/benefits were reviewed with the patient and family, and consent was obtained. The study was completed with SLP and Radiologist present. The patient was seen in lateral view(s). Textures given included  pudding thick, thin liquid, nectar thick liquid, honey thick liquid, mechanical soft consistency, and regular consistency.  Assessment: Consistencies were presented in the following order with the following results:   Trial 1: Honey Thick - Spoon  Decreased anterior excursion of the hyoid and mildly decreased relaxation of the upper esophageal sphincter (UES) with trace retention and retrograde flow of residue through the UES to the pyriform sinuses. No definite laryngeal penetration or aspiration observed.      Trial 2: Bow Thick - Rapid Straw  Premature loss othe vallecula secondary to a delay with the second sip. Decreased anterior excursion of the hyoid. No definite laryngeal penetration or aspiration. No definite laryngeal penetration or aspiration. No significant residue remained.      Trial 3: Thin Liquid - Single Straw  Premature loss to the vallecula secondary to decreased back of tongue control. Decreased anterior excursion of the hyoid. No definite laryngeal penetration or aspiration. Minimal residue remained on the back of tongue, base of tongue, and vallecula.      Trial 4: Pudding Thick Liquid - Spoon  Premature loss to the vallecula secondary to a delay. Decreased  anterior excursion of the hyoid. No definite laryngeal penetration or aspiration. No significant residue remained.      Trial 5: Soft Solids  Premature loss to the vallecula secondary to a delay. Decreased anterior excursion of the hyoid. No definite laryngeal penetration or aspiration observed. No significant residue remained.      Trial 6: Regular Solids  Decreased anterior excursion of the hyoid. Vallecular aggregation during mastication. No definite laryngeal penetration or aspiration. No significant residue remained.      Trial 7: Thin Liquid - Single Straw with esophageal screen  Premature loss to the vallecula secondary to decreased back of tongue control. Decreased anterior excursion of the hyoid. No definite laryngeal penetration or aspiration. Appeared to have adequate clearance of the esophagus.      Trial 9: Thin Liquid - Single Straw esophageal view only  Appeared to have adequate clearance of the esophagus. See radiology report for further details.    SLP Findings: Patient presents with minimal pharyngeal dysphagia.   Recommendations: Diet Textures: thin liquid, regular consistency food. Medications should be taken whole with thin liquids.   Recommended Strategies: Upright for PO, small bites and sips, and alternate liquids and solids. Oral care before breakfast, after all meals and PRN.  Dysphagia therapy is recommended. Pt has already started outpatient therapy. He may benefit from follow up to review exercises to address anterior hyoid excursion, however his swallow is functional to continue a regular diet.  Luca Mancia, CCC-SLP 7/31/2024 09:34 CDT     Visit Dx:     ICD-10-CM ICD-9-CM   1. Dysphagia, unspecified type  R13.10 787.20   2. History of stroke with residual deficit  I69.30 438.9       Patient Active Problem List   Diagnosis    Lichen planus    Laryngopharyngeal reflux    HTN (hypertension), benign    Barbosa's esophagus without dysplasia    Controlled type 2 diabetes mellitus  without complication, without long-term current use of insulin    Hx of colonic polyps    Gastroesophageal reflux disease with esophagitis    Acute right frontal stroke    TIA (transient ischemic attack)    Atrial fibrillation, new finding    Mixed hyperlipidemia        Past Medical History:   Diagnosis Date    Basal cell carcinoma     Hyperlipidemia     Hypertension     Kidney stone     Stroke 3/10/2024        Past Surgical History:   Procedure Laterality Date    COLONOSCOPY  10/30/2015    Normal exam repeat in 5 years    COLONOSCOPY N/A 11/22/2022    Procedure: COLONOSCOPY WITH ANESTHESIA;  Surgeon: Kelvin Mello MD;  Location:  [facility];  Service: Gastroenterology;  Laterality: N/A;  pre: hx polyps  post: polyps  Chito Finley MD     ENDOSCOPY  10/30/2015    Fundic glad polyp stomach    ENDOSCOPY N/A 11/22/2022    Procedure: ESOPHAGOGASTRODUODENOSCOPY WITH ANESTHESIA;  Surgeon: Kelvin Mello MD;  Location: [facility] END[facility];  Service: Gastroenterology;  Laterality: N/A;  pre: reflux  post: gastric polyps. dilation.   Chito Finley MD     GANGLION CYST EXCISION      KNEE ARTHROPLASTY      NECK SURGERY      SHOULDER SURGERY      SKIN CANCER EXCISION      UMBILICAL HERNIA REPAIR                        SLP Adult Swallow Evaluation       Row Name 07/31/24 0836       Rehab Evaluation    Document Type evaluation  -MB    Subjective Information no complaints  -MB    Patient Observations alert;cooperative  -MB    Patient/Family/Caregiver Comments/Observations Wife present  -MB       General Information    Patient Profile Reviewed yes  -MB    Pertinent History Of Current Problem Right CVA March 2024, GERD, c/o dysphagia and globus sensation. Hx neck surgery, upper endoscopy and esophageal dilation years ago.  -MB    Current Method of Nutrition regular textures;thin liquids  -MB    Precautions/Limitations, Vision WFL  -MB    Precautions/Limitations, Hearing WFL  -MB    Prior Level of  Function-Communication WFL  -MB    Prior Level of Function-Swallowing no diet consistency restrictions  -MB    Plans/Goals Discussed with patient;spouse/S.O.  -MB    Barriers to Rehab none identified  -MB    Patient's Goals for Discharge patient did not state  -MB    Family Goals for Discharge family did not state  -MB       Pain    Additional Documentation Pain Scale: FACES Pre/Post-Treatment (Group)  -MB       Pain Scale: FACES Pre/Post-Treatment    Pain: FACES Scale, Pretreatment 0-->no hurt  -MB       Oral Motor Structure and Function    Dentition Assessment natural, present and adequate  -MB    Secretion Management WNL/WFL  -MB    Mucosal Quality moist, healthy  -MB       Oral Musculature and Cranial Nerve Assessment    Oral Motor General Assessment oral labial or buccal impairment  -MB    Oral Labial or Buccal Impairment, Detail, Cranial Nerve VII (Facial): CN7: Motor Impairment;reduced ROM;left labial droop  -MB       General Eating/Swallowing Observations    Respiratory Support Currently in Use room air  -MB    Eating/Swallowing Skills fed by SLP  -MB    Positioning During Eating upright in bed  -MB    Utensils Used spoon;straw  -MB    Consistencies Trialed regular textures;soft to chew textures;thin liquids;nectar/syrup-thick liquids;honey-thick liquids;pudding thick  -MB       MBS/VFSS    Utensils Used spoon;straw  -MB    Consistencies Trialed regular textures;soft to chew textures;thin liquids;nectar/syrup-thick liquids;honey-thick liquids;pudding thick  -MB       MBS/VFSS Interpretation    Oral Prep Phase WFL  -MB    Oral Transit Phase impaired  -MB    Oral Residue WFL  -MB    VFSS Summary See note  -MB       Oral Transit Phase    Impaired Oral Transit Phase premature spillage of liquids into pharynx  -MB       Initiation of Pharyngeal Swallow    Initiation of Pharyngeal Swallow bolus in valleculae  -MB    Pharyngeal Phase impaired pharyngeal phase of swallowing  -MB    Anatomical abnormalities noted  c-spine hardware  -MB    Pharyngeal Residue honey-thick liquids  -MB       Esophageal Phase    Esophageal Phase esophageal retention with retrograde flow through PES  -MB       SLP Evaluation Clinical Impression    SLP Swallowing Diagnosis functional oral phase;mild;pharyngeal dysphagia  -MB    Functional Impact no impact on function  -MB    Rehab Potential/Prognosis, Swallowing good, to achieve stated therapy goals  -MB    Swallow Criteria for Skilled Therapeutic Interventions Met demonstrates skilled criteria  -MB       Recommendations    Therapy Frequency (Swallow) PRN  -MB    SLP Diet Recommendation regular textures;thin liquids  -MB    Recommended Precautions and Strategies upright posture during/after eating;small bites of food and sips of liquid;alternate between small bites of food and sips of liquid;general aspiration precautions  -MB    Oral Care Recommendations Oral Care BID/PRN  -MB    SLP Rec. for Method of Medication Administration meds whole;with thin liquids  -MB    Monitor for Signs of Aspiration yes;cough;gurgly voice;throat clearing  -MB    Anticipated Discharge Disposition (SLP) home with OP services  -MB              User Key  (r) = Recorded By, (t) = Taken By, (c) = Cosigned By      Initials Name Provider Type    Luca Jauregui CCC-SLP Speech and Language Pathologist                                    OP SLP Education       Row Name 07/31/24 0926       Education    Barriers to Learning No barriers identified  -MB    Education Provided Described results of evaluation;Patient expressed understanding of evaluation;Family/caregivers expressed understanding of evaluation  -MB    Assessed Learning needs;Learning motivation;Learning preferences;Learning readiness  -MB    Learning Motivation Strong  -MB    Learning Method Explanation  -MB    Teaching Response Verbalized understanding  -MB              User Key  (r) = Recorded By, (t) = Taken By, (c) = Cosigned By      Initials Name Effective  Luca Ramos CCC-SLP 02/03/23 -                                          Time Calculation:   Untimed Charges  69739-FU Motion Fluoro Eval Swallow Minutes: 57  Total Minutes  Untimed Charges Total Minutes: 57   Total Minutes: 57    Therapy Charges for Today       Code Description Service Date Service Provider Modifiers Qty    [id number]  ST MOTION FLUORO EVAL SWALLOW 4 7/31/2024 Luca Mancia CCC-SLP GN 1                    JENN Hwang  7/31/2024

## 2024-07-31 NOTE — PROGRESS NOTES
Please let patient know swallow screen was normal.  Continue to follow with speech therapy for further plan of care.

## 2024-08-08 ENCOUNTER — TELEPHONE (OUTPATIENT)
Dept: SLEEP CENTER | Age: 69
End: 2024-08-08

## 2024-08-08 ENCOUNTER — OFFICE VISIT (OUTPATIENT)
Dept: CARDIOLOGY | Facility: CLINIC | Age: 69
End: 2024-08-08
Payer: COMMERCIAL

## 2024-08-08 VITALS
WEIGHT: 232 LBS | BODY MASS INDEX: 34.36 KG/M2 | OXYGEN SATURATION: 98 % | HEART RATE: 76 BPM | SYSTOLIC BLOOD PRESSURE: 134 MMHG | DIASTOLIC BLOOD PRESSURE: 78 MMHG | HEIGHT: 69 IN

## 2024-08-08 DIAGNOSIS — E78.2 MIXED HYPERLIPIDEMIA: ICD-10-CM

## 2024-08-08 DIAGNOSIS — R07.89 CHEST PAIN, ATYPICAL: Primary | ICD-10-CM

## 2024-08-08 DIAGNOSIS — I10 HTN (HYPERTENSION), BENIGN: ICD-10-CM

## 2024-08-08 DIAGNOSIS — G45.9 TIA (TRANSIENT ISCHEMIC ATTACK): ICD-10-CM

## 2024-08-08 DIAGNOSIS — E11.9 CONTROLLED TYPE 2 DIABETES MELLITUS WITHOUT COMPLICATION, WITHOUT LONG-TERM CURRENT USE OF INSULIN: ICD-10-CM

## 2024-08-08 DIAGNOSIS — I48.0 PAROXYSMAL ATRIAL FIBRILLATION: ICD-10-CM

## 2024-08-08 RX ORDER — ASPIRIN 81 MG/1
81 TABLET ORAL DAILY
Qty: 90 TABLET | Refills: 3 | Status: SHIPPED | OUTPATIENT
Start: 2024-08-08

## 2024-08-08 RX ORDER — AMLODIPINE BESYLATE 10 MG/1
10 TABLET ORAL DAILY
Qty: 90 TABLET | Refills: 3 | Status: SHIPPED | OUTPATIENT
Start: 2024-08-08

## 2024-08-08 NOTE — TELEPHONE ENCOUNTER
Left voicemail with Mr.Thomas Hernandez about his HST performed  07/22/2024 and 07/23/2024.  The HST on 07/22/2024 revealed an AHI of 5.1,  and the study on 07/23/2024 revealed an AHI within normal limits.  An AHI between 5 and 14.9 is considered to be within the mild range.  Interpretations were sent to CABRERA Bravo today.   was asked to follow-up with Mr. Oliveira regarding recommendations from the interpreting physician.

## 2024-08-11 PROBLEM — R07.89 CHEST PAIN, ATYPICAL: Status: ACTIVE | Noted: 2024-08-11

## 2024-08-11 NOTE — PROGRESS NOTES
Subjective:     Encounter Date:08/08/2024      Patient ID: Rikki Amaya is a 68 y.o. male.    Chief Complaint:  History of Present Illness  History of Present Illness  The patient presents for evaluation of multiple medical concerns. He is accompanied by an adult female.    He reports feeling relatively well since starting Flecainide, which has improved his palpitations. However, he continues to experience constant fatigue, a condition that remains unchanged. In May 2024, he experienced chest tightness and a rapid heartbeat while walking to the parking lot. He occasionally experiences sharp, left-sided chest pain, which he suspects may be gas-related. He denies any chest pressure or squeezing sensation. The chest pain occurs randomly and does not occur during physical activities or at rest. He experienced a stroke in March 2024, but has not been officially diagnosed with atrial fibrillation. The accompanying adult female believes the hospital diagnosed him with atrial fibrillation, but the medical team disagreed this diagnosis. He has been taking insulin nightly since March 10, 2024, following a hospital visit. He was advised to engage in strenuous walking for 20 minutes, three times a week, by his neurologist, which he has done twice. However, he has ceased this activity due to a lack of energy and weakness. Dr. Woodson advised him to stop driving and yard work. His weakness has not improved, but his palpitations and heart rate have lessened. He is not taking baby aspirin daily. He monitors his glucose levels at night. He had blood work done with Dr. Woodson on 07/10/2024 and visits Dr. Woodson every 3 months. He denies any further stroke-like symptoms.    He monitors his blood pressure at home. His current medications include amlodipine 5 mg, Lipitor 80 mg, insulin for diabetes, Synthroid, Claritin, metoprolol 100 mg, and Benicar 40/25 mg.    He underwent a sleep apnea test at home 2 weeks ago at [facility], the  results of which are still pending. He occasionally stops breathing at night.        Review of Systems   Constitutional: Positive for malaise/fatigue. Negative for diaphoresis and fever.   HENT:  Negative for congestion.    Eyes:  Negative for vision loss in left eye and vision loss in right eye.   Cardiovascular:  Positive for irregular heartbeat. Negative for chest pain, claudication, dyspnea on exertion, leg swelling, orthopnea, palpitations and syncope.   Respiratory:  Positive for shortness of breath. Negative for cough and wheezing.    Hematologic/Lymphatic: Negative for adenopathy.   Skin:  Negative for rash.   Musculoskeletal:  Negative for joint pain and joint swelling.   Gastrointestinal:  Negative for abdominal pain, diarrhea, nausea and vomiting.   Neurological:  Negative for excessive daytime sleepiness, dizziness, focal weakness, light-headedness, numbness and weakness.   Psychiatric/Behavioral:  Negative for depression. The patient does not have insomnia.            Current Outpatient Medications:     apixaban (ELIQUIS) 5 MG tablet tablet, Take 1 tablet by mouth Every 12 (Twelve) Hours. Indications: Atrial Fibrillation, Disp: 60 tablet, Rfl: 1    atorvastatin (LIPITOR) 80 MG tablet, Take 1 tablet by mouth Every Night., Disp: 30 tablet, Rfl: 1    escitalopram (Lexapro) 5 MG tablet, Take 2 tablets by mouth Daily. Start with 1 tablet daily for 2 weeks. May increase to 2 tablets daily after 2 weeks., Disp: 30 tablet, Rfl: 1    flecainide (TAMBOCOR) 50 MG tablet, Take 1 tablet by mouth 2 (Two) Times a Day., Disp: 180 tablet, Rfl: 3    Ibuprofen-Diphenhydramine Cit (ADVIL PM PO), Take  by mouth., Disp: , Rfl:     insulin glargine (LANTUS, SEMGLEE) 100 UNIT/ML injection, Inject 6 Units under the skin into the appropriate area as directed Daily., Disp: , Rfl:     levothyroxine (SYNTHROID, LEVOTHROID) 25 MCG tablet, Take 1 tablet by mouth Daily., Disp: , Rfl:     loratadine (CLARITIN) 10 MG tablet, Take 1  tablet by mouth Daily., Disp: , Rfl:     metFORMIN (GLUCOPHAGE) 500 MG tablet, Take 2 tablets by mouth 2 (Two) Times a Day With Meals., Disp: , Rfl:     metoprolol succinate XL (TOPROL-XL) 100 MG 24 hr tablet, Take 1 tablet by mouth Daily., Disp: , Rfl:     olmesartan-hydrochlorothiazide (BENICAR HCT) 40-25 MG per tablet, Take 1 tablet by mouth Daily., Disp: , Rfl:     oxymetazoline (AFRIN) 0.05 % nasal spray, 2 sprays into the nostril(s) as directed by provider 2 (Two) Times a Day., Disp: , Rfl:     pantoprazole (PROTONIX) 40 MG EC tablet, Take 1 tablet by mouth Daily., Disp: , Rfl:     vitamin D (ERGOCALCIFEROL) 1.25 MG (43485 UT) capsule capsule, Take 1 capsule by mouth 1 (One) Time Per Week., Disp: , Rfl:     amLODIPine (NORVASC) 10 MG tablet, Take 1 tablet by mouth Daily., Disp: 90 tablet, Rfl: 3    aspirin 81 MG EC tablet, Take 1 tablet by mouth Daily., Disp: 90 tablet, Rfl: 3       Objective:      Vitals:    08/08/24 0948   BP: 134/78   Pulse: 76   SpO2: 98%     Vitals and nursing note reviewed.   Constitutional:       Appearance: Normal and healthy appearance. Well-developed and not in distress.   Eyes:      Extraocular Movements: Extraocular movements intact.      Pupils: Pupils are equal, round, and reactive to light.   HENT:      Head: Normocephalic and atraumatic.    Mouth/Throat:      Pharynx: Oropharynx is clear.   Neck:      Vascular: JVD normal.      Trachea: Trachea normal.   Pulmonary:      Effort: Pulmonary effort is normal.      Breath sounds: Normal breath sounds. No wheezing. No rhonchi. No rales.   Cardiovascular:      PMI at left midclavicular line. Normal rate. Regular rhythm. Normal S1. Normal S2.       Murmurs: There is a grade 2/6 systolic murmur.      No gallop.  No click. No rub.   Pulses:     Dorsalis pedis: 2+ bilaterally.     Posterior tibial: 2+ bilaterally.  Abdominal:      General: Bowel sounds are normal.      Palpations: Abdomen is soft.      Tenderness: There is no abdominal  tenderness.   Musculoskeletal: Normal range of motion.      Cervical back: Normal range of motion and neck supple. Skin:     General: Skin is warm and dry.      Capillary Refill: Capillary refill takes less than 2 seconds.   Feet:      Right foot:      Skin integrity: Skin integrity normal.      Left foot:      Skin integrity: Skin integrity normal.   Neurological:      Mental Status: Alert and oriented to person, place and time.      Sensory: Sensation is intact.      Motor: Motor function is intact.      Coordination: Coordination is intact.   Psychiatric:         Speech: Speech normal.         Behavior: Behavior is cooperative.       Physical Exam      Lab Review:         ECG 12 Lead    Date/Time: 8/11/2024 1:25 PM  Performed by: Shailesh Ta DO    Authorized by: Shailesh Ta DO  Comparison: compared with previous ECG   Similar to previous ECG  Rhythm: sinus bradycardia  Rate: bradycardic  Conduction: 1st degree AV block    Clinical impression: abnormal EKG        Results  Laboratory Studies  A1c is 8.7.    Imaging  Ultrasound in March shows the heart is strong, squeezing like it should, relaxing like it should. Mild to moderate thickening of the valve, but no regurgitation.    Testing  Stress test 2 years ago was normal. Holter monitor for 10 days showed a lot of SVT and PACs, but no atrial fibrillation.    Assessment/Plan:     Problem List Items Addressed This Visit       HTN (hypertension), benign    Relevant Medications    amLODIPine (NORVASC) 10 MG tablet    Controlled type 2 diabetes mellitus without complication, without long-term current use of insulin    TIA (transient ischemic attack)    Atrial fibrillation, new finding    Relevant Medications    amLODIPine (NORVASC) 10 MG tablet    Mixed hyperlipidemia    Chest pain, atypical - Primary    Relevant Orders    Stress Test With Myocardial Perfusion (1 Day)     Assessment & Plan  1. Palpitations.  His stress test from 2 years ago  was normal. An ultrasound in 03/2024 showed normal heart function and mild-to-moderate thickening of the valve, but no regurgitation. His EKG is normal today, with no extra beats, atrial fibrillation, or Supraventricular Tachycardia (SVT). He is currently on Eliquis, which is protecting him from atrial fibrillation-induced stroke. His A1c from 03/10/2024 was 8.7, indicating diabetes. His blood glucose levels were normal 9 years ago. A comprehensive blood work will be conducted to assess his diabetes and cholesterol levels. His amlodipine dosage will be increased from 5 mg to 10 mg, and a new prescription will be sent to [facility]. He will continue Eliquis, Lipitor 80 mg, flecainide, insulin, Synthroid, Claritin, metoprolol 100 mg, and Benicar 40/25 mg. A stress test will be ordered. If sleep apnea remains suspected, an in-lab sleep study is recommended.    2. Chest pain.  The description of his chest pain suggests indigestion.    Follow-up  He will follow up in 6 months.      Recommendations/plans:    Transcribed from ambient dictation for Shailesh Ta DO by Shailesh Ta DO.  08/11/24   13:24 CDT    Patient or patient representative verbalized consent for the use of Ambient Listening during the visit with  Shailesh Ta DO for chart documentation. 8/11/2024  13:25 CDT

## 2024-08-27 ENCOUNTER — HOSPITAL ENCOUNTER (OUTPATIENT)
Dept: CARDIOLOGY | Facility: HOSPITAL | Age: 69
Discharge: HOME | End: 2024-08-27
Payer: COMMERCIAL

## 2024-08-27 VITALS
SYSTOLIC BLOOD PRESSURE: 132 MMHG | HEIGHT: 69 IN | WEIGHT: 231.48 LBS | BODY MASS INDEX: 34.29 KG/M2 | HEART RATE: 57 BPM | DIASTOLIC BLOOD PRESSURE: 67 MMHG

## 2024-08-27 DIAGNOSIS — R07.89 CHEST PAIN, ATYPICAL: ICD-10-CM

## 2024-08-27 PROCEDURE — 0 TECHNETIUM TETROFOSMIN KIT: Performed by: EMERGENCY MEDICINE

## 2024-08-27 PROCEDURE — A9502 TC99M TETROFOSMIN: HCPCS | Performed by: EMERGENCY MEDICINE

## 2024-08-27 PROCEDURE — 78451 HT MUSCLE IMAGE SPECT SING: CPT

## 2024-08-27 PROCEDURE — 93017 CV STRESS TEST TRACING ONLY: CPT

## 2024-08-27 PROCEDURE — 25010000002 REGADENOSON 0.4 MG/5ML SOLUTION: Performed by: EMERGENCY MEDICINE

## 2024-08-27 RX ORDER — REGADENOSON 0.08 MG/ML
0.4 INJECTION, SOLUTION INTRAVENOUS ONCE
Status: COMPLETED | OUTPATIENT
Start: 2024-08-27 | End: 2024-08-27

## 2024-08-27 RX ADMIN — TETROFOSMIN 1 DOSE: 1.38 INJECTION, POWDER, LYOPHILIZED, FOR SOLUTION INTRAVENOUS at 10:17

## 2024-08-27 RX ADMIN — REGADENOSON 0.4 MG: 0.08 INJECTION, SOLUTION INTRAVENOUS at 08:54

## 2024-08-27 RX ADMIN — TETROFOSMIN 1 DOSE: 1.38 INJECTION, POWDER, LYOPHILIZED, FOR SOLUTION INTRAVENOUS at 07:53

## 2024-08-28 LAB
BH CV NUCLEAR PRIOR STUDY: 3
BH CV REST NUCLEAR ISOTOPE DOSE: 10.1 MCI
BH CV STRESS BP STAGE 1: NORMAL
BH CV STRESS COMMENTS STAGE 1: NORMAL
BH CV STRESS DOSE REGADENOSON STAGE 1: 0.4
BH CV STRESS DURATION MIN STAGE 1: 0
BH CV STRESS DURATION SEC STAGE 1: 10
BH CV STRESS HR STAGE 1: 64
BH CV STRESS NUCLEAR ISOTOPE DOSE: 34 MCI
BH CV STRESS PROTOCOL 1: NORMAL
BH CV STRESS RECOVERY BP: NORMAL MMHG
BH CV STRESS RECOVERY HR: 65 BPM
BH CV STRESS STAGE 1: 1
MAXIMAL PREDICTED HEART RATE: 151 BPM
PERCENT MAX PREDICTED HR: 42.38 %
STRESS BASELINE BP: NORMAL MMHG
STRESS BASELINE HR: 57 BPM
STRESS PERCENT HR: 50 %
STRESS POST EXERCISE DUR MIN: 0 MIN
STRESS POST EXERCISE DUR SEC: 10 SEC
STRESS POST PEAK BP: NORMAL MMHG
STRESS POST PEAK HR: 64 BPM
STRESS TARGET HR: 128 BPM

## 2024-09-01 DIAGNOSIS — R94.39 ABNORMAL STRESS TEST: Primary | ICD-10-CM

## 2024-09-04 PROBLEM — R94.39 ABNORMAL STRESS TEST: Status: ACTIVE | Noted: 2024-09-01

## 2024-09-10 ENCOUNTER — HOSPITAL ENCOUNTER (OUTPATIENT)
Dept: CARDIOLOGY | Facility: HOSPITAL | Age: 69
Setting detail: HOSPITAL OUTPATIENT SURGERY
Discharge: HOME | End: 2024-09-10
Admitting: EMERGENCY MEDICINE
Payer: COMMERCIAL

## 2024-09-10 LAB
ANION GAP SERPL CALCULATED.3IONS-SCNC: 11 MMOL/L (ref 5–15)
BASOPHILS # BLD AUTO: 0.05 10*3/MM3 (ref 0–0.2)
BASOPHILS NFR BLD AUTO: 0.8 % (ref 0–1.5)
BUN SERPL-MCNC: 15 MG/DL (ref 8–23)
BUN/CREAT SERPL: 15.5 (ref 7–25)
CALCIUM SPEC-SCNC: 9.1 MG/DL (ref 8.6–10.5)
CHLORIDE SERPL-SCNC: 103 MMOL/L (ref 98–107)
CO2 SERPL-SCNC: 28 MMOL/L (ref 22–29)
CREAT SERPL-MCNC: 0.97 MG/DL (ref 0.76–1.27)
DEPRECATED RDW RBC AUTO: 37.2 FL (ref 37–54)
EGFRCR SERPLBLD CKD-EPI 2021: 84.5 ML/MIN/1.73
EOSINOPHIL # BLD AUTO: 0.26 10*3/MM3 (ref 0–0.4)
EOSINOPHIL NFR BLD AUTO: 4.4 % (ref 0.3–6.2)
ERYTHROCYTE [DISTWIDTH] IN BLOOD BY AUTOMATED COUNT: 11.9 % (ref 12.3–15.4)
GLUCOSE SERPL-MCNC: 105 MG/DL (ref 65–99)
HCT VFR BLD AUTO: 41.2 % (ref 37.5–51)
HGB BLD-MCNC: 14.4 G/DL (ref 13–17.7)
IMM GRANULOCYTES # BLD AUTO: 0.03 10*3/MM3 (ref 0–0.05)
IMM GRANULOCYTES NFR BLD AUTO: 0.5 % (ref 0–0.5)
INR PPP: 1.2 (ref 0.91–1.09)
LYMPHOCYTES # BLD AUTO: 1.32 10*3/MM3 (ref 0.7–3.1)
LYMPHOCYTES NFR BLD AUTO: 22.1 % (ref 19.6–45.3)
MCH RBC QN AUTO: 30.1 PG (ref 26.6–33)
MCHC RBC AUTO-ENTMCNC: 35 G/DL (ref 31.5–35.7)
MCV RBC AUTO: 86 FL (ref 79–97)
MONOCYTES # BLD AUTO: 0.68 10*3/MM3 (ref 0.1–0.9)
MONOCYTES NFR BLD AUTO: 11.4 % (ref 5–12)
NEUTROPHILS NFR BLD AUTO: 3.63 10*3/MM3 (ref 1.7–7)
NEUTROPHILS NFR BLD AUTO: 60.8 % (ref 42.7–76)
PLATELET # BLD AUTO: 123 10*3/MM3 (ref 140–450)
PMV BLD AUTO: 10.8 FL (ref 6–12)
POTASSIUM SERPL-SCNC: 3.7 MMOL/L (ref 3.5–5.2)
PROTHROMBIN TIME: 15.7 SECONDS (ref 11.8–14.8)
RBC # BLD AUTO: 4.79 10*6/MM3 (ref 4.14–5.8)
SODIUM SERPL-SCNC: 142 MMOL/L (ref 136–145)
WBC NRBC COR # BLD AUTO: 5.97 10*3/MM3 (ref 3.4–10.8)

## 2024-09-10 PROCEDURE — 80048 BASIC METABOLIC PNL TOTAL CA: CPT | Performed by: EMERGENCY MEDICINE

## 2024-09-10 PROCEDURE — 85025 COMPLETE CBC W/AUTO DIFF WBC: CPT | Performed by: EMERGENCY MEDICINE

## 2024-09-10 PROCEDURE — 85610 PROTHROMBIN TIME: CPT | Performed by: EMERGENCY MEDICINE

## 2024-09-11 ENCOUNTER — OFFICE VISIT (OUTPATIENT)
Dept: NEUROLOGY | Facility: CLINIC | Age: 69
End: 2024-09-11
Payer: COMMERCIAL

## 2024-09-11 VITALS
DIASTOLIC BLOOD PRESSURE: 74 MMHG | HEIGHT: 69 IN | SYSTOLIC BLOOD PRESSURE: 138 MMHG | BODY MASS INDEX: 35.4 KG/M2 | WEIGHT: 239 LBS | RESPIRATION RATE: 17 BRPM | HEART RATE: 68 BPM

## 2024-09-11 DIAGNOSIS — E11.65 TYPE 2 DIABETES MELLITUS WITH HYPERGLYCEMIA, WITH LONG-TERM CURRENT USE OF INSULIN: ICD-10-CM

## 2024-09-11 DIAGNOSIS — E66.9 OBESITY (BMI 30.0-34.9): ICD-10-CM

## 2024-09-11 DIAGNOSIS — E78.5 HYPERLIPIDEMIA LDL GOAL <70: ICD-10-CM

## 2024-09-11 DIAGNOSIS — Z79.01 CHRONIC ANTICOAGULATION: ICD-10-CM

## 2024-09-11 DIAGNOSIS — Z86.73 HISTORY OF STROKE WITHOUT RESIDUAL DEFICITS: Primary | ICD-10-CM

## 2024-09-11 DIAGNOSIS — Z79.4 TYPE 2 DIABETES MELLITUS WITH HYPERGLYCEMIA, WITH LONG-TERM CURRENT USE OF INSULIN: ICD-10-CM

## 2024-09-11 DIAGNOSIS — I48.0 PAROXYSMAL ATRIAL FIBRILLATION: ICD-10-CM

## 2024-09-11 DIAGNOSIS — I10 HTN (HYPERTENSION), BENIGN: ICD-10-CM

## 2024-09-11 RX ORDER — SIMVASTATIN 10 MG
10 TABLET ORAL NIGHTLY
COMMUNITY
End: 2024-09-13 | Stop reason: HOSPADM

## 2024-09-11 RX ORDER — HYDROXYCHLOROQUINE SULFATE 200 MG/1
200 TABLET, FILM COATED ORAL 2 TIMES DAILY
COMMUNITY

## 2024-09-11 NOTE — PROGRESS NOTES
"  Neurology Consult Note    [facility]  [address], [address]  [place] [address]  Phone: [phone]  Fax: [phone]    Referring Provider:   No ref. provider found  Primary Care Provider:  Chito Finley MD    Reason for Consult:  History of stroke  Subjective      Rikki Amaya presents to [facility]    History of Present Illness  68-year-old male seen for follow-up of stroke.  Last seen in clinic 7/12/2024.  Patient is managed on apixaban 5 mg twice daily as well as atorvastatin 80 mg.  Patient has minimal left-sided weakness as well as dysphagia.  Reportedly patient was having a polysomnography test performed on July 22.  Patient reports he had been very sedentary.  Does not do much activity at home.  Patient still off work per his PCP.  Concern for postop depression.  I did start patient on Lexapro 5 mg.  Did instruct patient he may increase to 2 tablets after 2 weeks.    Patient was seen by speech therapy after last visit.  He did have a video swallow screen which was normal.    Patient was seen by Filemon Sheffield MD noted patient had no evidence of sleep-related breathing disorder.    Today patient presents with spouse.  Reports me no new stroke symptoms since last being seen.  He continues on Eliquis, atorvastatin, aspirin and simvastatin per his report.  Patient does note he was on Lexapro for approximately 2 weeks however had adverse effects with this.  He notes \"I had no emotion\".  He did stop this on his own.  He notes has been short of breath.  He has been worked up through cardiology.  He has a cath scheduled this week.  Overall no concerns in regards to strokes.  Patient Active Problem List   Diagnosis    Lichen planus    Laryngopharyngeal reflux    HTN (hypertension), benign    Barbosa's esophagus without dysplasia    Controlled type 2 diabetes mellitus without complication, without long-term current use of insulin    Hx of colonic " polyps    Gastroesophageal reflux disease with esophagitis    Acute right frontal stroke    TIA (transient ischemic attack)    Atrial fibrillation, new finding    Mixed hyperlipidemia    Chest pain, atypical    Abnormal stress test        Past Medical History:   Diagnosis Date    Basal cell carcinoma     Hyperlipidemia     Hypertension     Kidney stone     Stroke 3/10/2024        Social History     Socioeconomic History    Marital status:    Tobacco Use    Smoking status: Never    Smokeless tobacco: Former     Types: Chew   Vaping Use    Vaping status: Never Used   Substance and Sexual Activity    Alcohol use: No    Drug use: No    Sexual activity: Defer        No Known Allergies       Current Outpatient Medications:     amLODIPine (NORVASC) 10 MG tablet, Take 1 tablet by mouth Daily., Disp: 90 tablet, Rfl: 3    apixaban (ELIQUIS) 5 MG tablet tablet, Take 1 tablet by mouth Every 12 (Twelve) Hours. Indications: Atrial Fibrillation, Disp: 60 tablet, Rfl: 1    aspirin 81 MG EC tablet, Take 1 tablet by mouth Daily., Disp: 90 tablet, Rfl: 3    atorvastatin (LIPITOR) 80 MG tablet, Take 1 tablet by mouth Every Night., Disp: 30 tablet, Rfl: 1    flecainide (TAMBOCOR) 50 MG tablet, Take 1 tablet by mouth 2 (Two) Times a Day., Disp: 180 tablet, Rfl: 3    hydroxychloroquine (PLAQUENIL) 200 MG tablet, Take 1 tablet by mouth 2 (Two) Times a Day., Disp: , Rfl:     Ibuprofen-Diphenhydramine Cit (ADVIL PM PO), Take  by mouth., Disp: , Rfl:     insulin glargine (LANTUS, SEMGLEE) 100 UNIT/ML injection, Inject 6 Units under the skin into the appropriate area as directed Daily., Disp: , Rfl:     levothyroxine (SYNTHROID, LEVOTHROID) 25 MCG tablet, Take 1 tablet by mouth Daily., Disp: , Rfl:     loratadine (CLARITIN) 10 MG tablet, Take 1 tablet by mouth Daily., Disp: , Rfl:     metFORMIN (GLUCOPHAGE) 500 MG tablet, Take 2 tablets by mouth 2 (Two) Times a Day With Meals., Disp: , Rfl:     metoprolol succinate XL (TOPROL-XL) 100 MG  "24 hr tablet, Take 1 tablet by mouth Daily., Disp: , Rfl:     olmesartan-hydrochlorothiazide (BENICAR HCT) 40-25 MG per tablet, Take 1 tablet by mouth Daily., Disp: , Rfl:     oxymetazoline (AFRIN) 0.05 % nasal spray, 2 sprays into the nostril(s) as directed by provider 2 (Two) Times a Day., Disp: , Rfl:     pantoprazole (PROTONIX) 40 MG EC tablet, Take 1 tablet by mouth Daily., Disp: , Rfl:     simvastatin (ZOCOR) 10 MG tablet, Take 1 tablet by mouth Every Night., Disp: , Rfl:     vitamin D (ERGOCALCIFEROL) 1.25 MG (28350 UT) capsule capsule, Take 1 capsule by mouth 1 (One) Time Per Week., Disp: , Rfl:        Objective   Vital Signs:   /74 (BP Location: Left arm, Patient Position: Sitting)   Pulse 68   Resp 17   Ht 175.3 cm (69\")   Wt 108 kg (239 lb)   BMI 35.29 kg/m²       Physical Exam  Vitals and nursing note reviewed.   Constitutional:       Appearance: Normal appearance.   HENT:      Head: Normocephalic.   Eyes:      General: Lids are normal.      Extraocular Movements: Extraocular movements intact.      Pupils: Pupils are equal, round, and reactive to light.   Neck:      Vascular: No carotid bruit.   Cardiovascular:      Rate and Rhythm: Normal rate and regular rhythm.      Heart sounds: Normal heart sounds.   Pulmonary:      Effort: Pulmonary effort is normal. No respiratory distress.   Skin:     General: Skin is warm and dry.   Neurological:      Mental Status: He is alert.      Motor: Motor strength is normal.     Deep Tendon Reflexes: Reflexes are normal and symmetric.   Psychiatric:         Speech: Speech normal.        Neurological Exam  Mental Status  Alert. Oriented to person, place, time and situation. Speech is normal. Language is fluent with no aphasia.    Cranial Nerves  CN II: Visual fields full to confrontation.  CN III, IV, VI: Extraocular movements intact bilaterally. Normal lids and orbits bilaterally. Pupils equal round and reactive to light bilaterally.  CN V: Facial sensation is " normal.  CN VII: Full and symmetric facial movement.  CN IX, X: Palate elevates symmetrically. Normal gag reflex.  CN XI: Shoulder shrug strength is normal.  CN XII: Tongue midline without atrophy or fasciculations.    Motor  Normal muscle bulk throughout. No fasciculations present. Normal muscle tone. No abnormal involuntary movements. Strength is 5/5 throughout all four extremities.    Sensory  Light touch is normal in upper and lower extremities.     Reflexes  Deep tendon reflexes are 2+ and symmetric in all four extremities.    Gait  Casual gait: Normal stance. Reduced stride length. Antalgic gait. Normal right arm swing. Normal left arm swing.      Result Review :   The following data was reviewed by: YANNI Gamez on 09/11/2024:  CMP          3/10/2024    04:17 3/11/2024    04:44 9/10/2024    09:40   CMP   Glucose 286  201  105    BUN 16  14  15    Creatinine 0.93  0.93  0.97    EGFR 88.9  88.9  84.5    Sodium 137  141  142    Potassium 4.4  3.5  3.7    Chloride 101  102  103    Calcium 9.2  8.7  9.1    Total Protein  6.9     Albumin  4.1     Globulin  2.8     Total Bilirubin  0.6     Alkaline Phosphatase  60     AST (SGOT)  17     ALT (SGPT)  28     Albumin/Globulin Ratio  1.5     BUN/Creatinine Ratio 17.2  15.1  15.5    Anion Gap 12.0  12.0  11.0      CBC w/diff          3/10/2024    04:17 3/11/2024    04:44 9/10/2024    09:40   CBC w/Diff   WBC 6.54  8.30  5.97    RBC 4.98  5.15  4.79    Hemoglobin 15.5  15.9  14.4    Hematocrit 43.1  45.8  41.2    MCV 86.5  88.9  86.0    MCH 31.1  30.9  30.1    MCHC 36.0  34.7  35.0    RDW 11.6  11.9  11.9    Platelets 160  172  123    Neutrophil Rel % 62.2   60.8    Immature Granulocyte Rel % 0.5   0.5    Lymphocyte Rel % 25.2   22.1    Monocyte Rel % 9.9   11.4    Eosinophil Rel % 1.7   4.4    Basophil Rel % 0.5   0.8      Lipid Panel          3/10/2024    04:17   Lipid Panel   Total Cholesterol 165    Triglycerides 148    HDL Cholesterol 31    VLDL Cholesterol  27    LDL Cholesterol  107     111    LDL/HDL Ratio 3.37        Most Recent A1C          3/10/2024    04:17   HGBA1C Most Recent   Hemoglobin A1C 8.70      Progress Notes by Peter Gupta APRN (07/12/2024 10:30)       FL Video Swallow With Speech Single Contrast (07/31/2024 08:45)     LABS SCANNED (07/10/2024)                     Impression:  Rikki Amaya is a 68 y.o. male who presents for follow-up of stroke.  Etiology felt to be cardioembolic from new onset atrial fibrillation.  Patient managed on anticoagulation since.  Other workup revealed an elevated LDL and A1c.  At this time patient has done well in recovery.  No residual deficits seen today on exam.  I would recommend continuing secondary stroke preventative strategies through primary care.  No further workup indicated.  Recommendations will be outlined below.    Diagnoses and all orders for this visit:    1. History of stroke without residual deficits (Primary)    2. Paroxysmal atrial fibrillation    3. Chronic anticoagulation    4. Hyperlipidemia LDL goal <70    5. Type 2 diabetes mellitus with hyperglycemia, with long-term current use of insulin    6. HTN (hypertension), benign    7. Obesity (BMI 30.0-34.9)        Plan:  Continue apixaban 5 mg twice daily for stroke prevention in the setting of atrial fibrillation.  Continue atorvastatin 80 mg daily lipid management and stroke prevention.  No current neurological indication for aspirin.  LDL goal less than 70.  Most recently at goal with 72 as of July 2024.  A1c goal less than 7.0.  Currently at goal with 6.1.  BP goal less than 130/80.  Currently 138/74 in clinic.  Return to the emergency room for any new stroke symptoms.  Reviewed to be-fast.  Recommend Mediterranean-style diet  Continue to increase activities as tolerated  Follow-up with cardiology as scheduled  Follow-up with primary care as scheduled  Follow-up in our clinic as needed    The patient and I have discussed the plan of care and  he is in full agreement at this time.     Follow Up   Return if symptoms worsen or fail to improve, for Stroke/TIA.            Peter Gupta, YANNI  09/11/24  09:18 CDT

## 2024-09-13 ENCOUNTER — HOSPITAL ENCOUNTER (OUTPATIENT)
Facility: HOSPITAL | Age: 69
Setting detail: HOSPITAL OUTPATIENT SURGERY
Discharge: HOME | End: 2024-09-13
Attending: EMERGENCY MEDICINE | Admitting: EMERGENCY MEDICINE
Payer: COMMERCIAL

## 2024-09-13 VITALS
HEART RATE: 52 BPM | DIASTOLIC BLOOD PRESSURE: 89 MMHG | HEIGHT: 67 IN | RESPIRATION RATE: 17 BRPM | SYSTOLIC BLOOD PRESSURE: 150 MMHG | BODY MASS INDEX: 37.23 KG/M2 | WEIGHT: 237.2 LBS | OXYGEN SATURATION: 93 %

## 2024-09-13 DIAGNOSIS — R94.39 ABNORMAL STRESS TEST: ICD-10-CM

## 2024-09-13 PROCEDURE — 25010000002 DIPHENHYDRAMINE PER 50 MG: Performed by: EMERGENCY MEDICINE

## 2024-09-13 PROCEDURE — C1894 INTRO/SHEATH, NON-LASER: HCPCS | Performed by: EMERGENCY MEDICINE

## 2024-09-13 PROCEDURE — 25010000002 FENTANYL CITRATE (PF) 50 MCG/ML SOLUTION: Performed by: EMERGENCY MEDICINE

## 2024-09-13 PROCEDURE — 25510000001 IOPAMIDOL PER 1 ML: Performed by: EMERGENCY MEDICINE

## 2024-09-13 PROCEDURE — 99152 MOD SED SAME PHYS/QHP 5/>YRS: CPT | Performed by: EMERGENCY MEDICINE

## 2024-09-13 PROCEDURE — S0260 H&P FOR SURGERY: HCPCS | Performed by: EMERGENCY MEDICINE

## 2024-09-13 PROCEDURE — 25010000002 HEPARIN (PORCINE) 1000-0.9 UT/500ML-% SOLUTION: Performed by: EMERGENCY MEDICINE

## 2024-09-13 PROCEDURE — C1769 GUIDE WIRE: HCPCS | Performed by: EMERGENCY MEDICINE

## 2024-09-13 PROCEDURE — 93458 L HRT ARTERY/VENTRICLE ANGIO: CPT | Performed by: EMERGENCY MEDICINE

## 2024-09-13 PROCEDURE — 25010000002 HEPARIN (PORCINE) 2000-0.9 UNIT/L-% SOLUTION: Performed by: EMERGENCY MEDICINE

## 2024-09-13 PROCEDURE — 25010000002 MIDAZOLAM HCL (PF) 5 MG/5ML SOLUTION: Performed by: EMERGENCY MEDICINE

## 2024-09-13 PROCEDURE — 25010000002 HEPARIN (PORCINE) PER 1000 UNITS: Performed by: EMERGENCY MEDICINE

## 2024-09-13 RX ORDER — HEPARIN SODIUM 200 [USP'U]/100ML
INJECTION, SOLUTION INTRAVENOUS
Status: DISCONTINUED | OUTPATIENT
Start: 2024-09-13 | End: 2024-09-13 | Stop reason: HOSPADM

## 2024-09-13 RX ORDER — FENTANYL CITRATE 50 UG/ML
INJECTION, SOLUTION INTRAMUSCULAR; INTRAVENOUS
Status: DISCONTINUED | OUTPATIENT
Start: 2024-09-13 | End: 2024-09-13 | Stop reason: HOSPADM

## 2024-09-13 RX ORDER — ACETAMINOPHEN 325 MG/1
650 TABLET ORAL EVERY 4 HOURS PRN
Status: DISCONTINUED | OUTPATIENT
Start: 2024-09-13 | End: 2024-09-13 | Stop reason: HOSPADM

## 2024-09-13 RX ORDER — IOPAMIDOL 755 MG/ML
INJECTION, SOLUTION INTRAVASCULAR
Status: DISCONTINUED | OUTPATIENT
Start: 2024-09-13 | End: 2024-09-13 | Stop reason: HOSPADM

## 2024-09-13 RX ORDER — MIDAZOLAM HYDROCHLORIDE 5 MG/5ML
INJECTION, SOLUTION INTRAMUSCULAR; INTRAVENOUS
Status: DISCONTINUED | OUTPATIENT
Start: 2024-09-13 | End: 2024-09-13 | Stop reason: HOSPADM

## 2024-09-13 RX ORDER — LIDOCAINE HYDROCHLORIDE 20 MG/ML
INJECTION, SOLUTION INFILTRATION; PERINEURAL
Status: DISCONTINUED | OUTPATIENT
Start: 2024-09-13 | End: 2024-09-13 | Stop reason: HOSPADM

## 2024-09-13 RX ORDER — DIPHENHYDRAMINE HYDROCHLORIDE 50 MG/ML
INJECTION INTRAMUSCULAR; INTRAVENOUS
Status: DISCONTINUED | OUTPATIENT
Start: 2024-09-13 | End: 2024-09-13 | Stop reason: HOSPADM

## 2024-09-13 RX ORDER — NITROGLYCERIN 0.4 MG/1
0.4 TABLET SUBLINGUAL
Status: DISCONTINUED | OUTPATIENT
Start: 2024-09-13 | End: 2024-09-13 | Stop reason: HOSPADM

## 2024-09-13 RX ORDER — SODIUM CHLORIDE 9 MG/ML
1 INJECTION, SOLUTION INTRAVENOUS CONTINUOUS
Status: DISCONTINUED | OUTPATIENT
Start: 2024-09-13 | End: 2024-09-13 | Stop reason: HOSPADM

## 2024-09-13 RX ORDER — ASPIRIN 325 MG
TABLET ORAL
Status: DISCONTINUED | OUTPATIENT
Start: 2024-09-13 | End: 2024-09-13 | Stop reason: HOSPADM

## 2024-09-13 RX ORDER — SODIUM CHLORIDE 9 MG/ML
100 INJECTION, SOLUTION INTRAVENOUS CONTINUOUS
Status: DISCONTINUED | OUTPATIENT
Start: 2024-09-13 | End: 2024-09-13 | Stop reason: HOSPADM

## 2024-09-13 RX ORDER — VERAPAMIL HYDROCHLORIDE 2.5 MG/ML
INJECTION, SOLUTION INTRAVENOUS
Status: DISCONTINUED | OUTPATIENT
Start: 2024-09-13 | End: 2024-09-13 | Stop reason: HOSPADM

## 2024-09-13 RX ORDER — HEPARIN SODIUM 1000 [USP'U]/ML
INJECTION, SOLUTION INTRAVENOUS; SUBCUTANEOUS
Status: DISCONTINUED | OUTPATIENT
Start: 2024-09-13 | End: 2024-09-13 | Stop reason: HOSPADM

## 2024-09-13 NOTE — OP NOTE
[facility]        Date of procedure: 9/13/2024     Procedures performed:     1.  Access to the right radial artery via modified Seldinger technique and ultrasound guidance  2.  Left heart catheterization with retrograde cross aortic valve the left ventricle pressure recorded  3.  LV ventriculography  4.  Selective bilateral coronary angiography  5.  Conscious sedation with continuous hemodynamic monitoring for 20 minutes  6.  Patent hemostasis achieved in the right radial artery access site using a TR band      Risk, Benefits, and Alternatives discussed with the patient and/or family.  Plan is for moderate sedation, and the patient agrees to proceed with the procedure.  An immediate assessment was done prior to the administration of moderate sedation        Indication: Abnormal stress test  Premedication: Versed, Fentanyl  Contrast: Isovue 92 cc  Radiation: Flouro time= 2.7 minutes. Air Kerma= 460 mGy  Catheters: 5F TIG, pigtail      Procedural details:    The patient was brought to the cath lab and prepped and draped in the usual fashion.  Access was obtained in the right radial artery via modified Seldinger technique and ultrasound guidance.  A 6 [place] sheath was placed into the artery and flushed.  Next, TIG catheter was inserted and used to engage both the left and right coronary arteries and selective bilateral coronary angiography performed in multiple views.  A pigtail was inserted and used to cross the aortic valve into the left ventricle pressure recorded LV ventriculography was performed.  Catheter was pulled back across the aortic valve and again pressures were recorded.  Everything was then withdrawn through the sheath and the sheath was flushed.  Patent hemostasis was achieved in the right radial artery access site using a TR band.  Patient tolerated procedure well without any complications.  He left the Cath Lab in stable condition.      I supervised the administration of  conscious sedation by nursing staff throughout the case.  First dose was given at 1008 and the end of my face-to-face encounter was at 1028, accounting for a total of 20 minutes of supervision.  During the case, continuous pulse oximetry, heart rate, blood pressure, and patient status were monitored.     Findings:    Hemodynamics:  Please see dedicated hemodynamics report for pressures    Left ventriculography:  1. The contractility of the left ventricle is normal .  Estimated ejection fraction 60%.  2.  No significant gradient across aortic valve on pullback    Selective coronary angiography:     Left main: Large-caliber vessel that bifurcates into the LAD and left circumflex, no angiographic evidence of stenosis, SINAN-3 flow    LAD: Moderate to large caliber vessel with mild disease noted in the midsegment, SINAN-3 flow    Diagonals: First diagonal is moderate caliber with no significant disease, second diagonal is moderate caliber no significant disease, third diagonal small caliber    Left circumflex: Large-caliber, dominant vessel with no angiographic evidence of stenosis, SINAN-3 flow    Obtuse marginals: First OM is small caliber, the second and third obtuse marginals are moderate caliber with no significant disease    PDA/OSKAR: Both originate from the left circumflex and are moderate caliber with mild disease    RCA: Small to moderate caliber, nondominant vessel with 50 to 60% stenosis in the midsegment, SINAN-3 flow      Estimated Blood Loss: 20 cc    Specimens: None    Complications: None     Impression:  1.  Mild, nonobstructive coronary artery disease as described above    Plan:   1.  TR band off in 2 hours, discharge home later today  2.  Continue on current cardiac regimen without change today  3.  Follow-up in the office in 3 months to continue to optimize cardiovascular regimen    Shailesh Ta, DO  Interventional cardiology  [facility]  September 13, 2024

## 2024-09-13 NOTE — H&P
Patient seen and examined at bedside.  The history and physical have not changed as below.    We will performing a diagnostic left heart catheterization today with possible invention based on findings.    Risk, benefits and alternatives were explained to the patient he wished to proceed.       Subjective[]Expand by Default  Patient ID: Rikki Amaya is a 68 y.o. male.     Chief Complaint:  History of Present Illness  History of Present Illness  The patient presents for evaluation of multiple medical concerns. He is accompanied by an adult female.     He reports feeling relatively well since starting Flecainide, which has improved his palpitations. However, he continues to experience constant fatigue, a condition that remains unchanged. In May 2024, he experienced chest tightness and a rapid heartbeat while walking to the parking lot. He occasionally experiences sharp, left-sided chest pain, which he suspects may be gas-related. He denies any chest pressure or squeezing sensation. The chest pain occurs randomly and does not occur during physical activities or at rest. He experienced a stroke in March 2024, but has not been officially diagnosed with atrial fibrillation. The accompanying adult female believes the hospital diagnosed him with atrial fibrillation, but the medical team disagreed this diagnosis. He has been taking insulin nightly since March 10, 2024, following a hospital visit. He was advised to engage in strenuous walking for 20 minutes, three times a week, by his neurologist, which he has done twice. However, he has ceased this activity due to a lack of energy and weakness. Dr. Woodson advised him to stop driving and yard work. His weakness has not improved, but his palpitations and heart rate have lessened. He is not taking baby aspirin daily. He monitors his glucose levels at night. He had blood work done with Dr. Woodson on 07/10/2024 and visits Dr. Woodson every 3 months. He denies any further stroke-like  symptoms.     He monitors his blood pressure at home. His current medications include amlodipine 5 mg, Lipitor 80 mg, insulin for diabetes, Synthroid, Claritin, metoprolol 100 mg, and Benicar 40/25 mg.     He underwent a sleep apnea test at home 2 weeks ago at [facility], the results of which are still pending. He occasionally stops breathing at night.           Review of Systems   Constitutional: Positive for malaise/fatigue. Negative for diaphoresis and fever.   HENT:  Negative for congestion.    Eyes:  Negative for vision loss in left eye and vision loss in right eye.   Cardiovascular:  Positive for irregular heartbeat. Negative for chest pain, claudication, dyspnea on exertion, leg swelling, orthopnea, palpitations and syncope.   Respiratory:  Positive for shortness of breath. Negative for cough and wheezing.    Hematologic/Lymphatic: Negative for adenopathy.   Skin:  Negative for rash.   Musculoskeletal:  Negative for joint pain and joint swelling.   Gastrointestinal:  Negative for abdominal pain, diarrhea, nausea and vomiting.   Neurological:  Negative for excessive daytime sleepiness, dizziness, focal weakness, light-headedness, numbness and weakness.   Psychiatric/Behavioral:  Negative for depression. The patient does not have insomnia.               Current Medications      Current Outpatient Medications:     apixaban (ELIQUIS) 5 MG tablet tablet, Take 1 tablet by mouth Every 12 (Twelve) Hours. Indications: Atrial Fibrillation, Disp: 60 tablet, Rfl: 1    atorvastatin (LIPITOR) 80 MG tablet, Take 1 tablet by mouth Every Night., Disp: 30 tablet, Rfl: 1    escitalopram (Lexapro) 5 MG tablet, Take 2 tablets by mouth Daily. Start with 1 tablet daily for 2 weeks. May increase to 2 tablets daily after 2 weeks., Disp: 30 tablet, Rfl: 1    flecainide (TAMBOCOR) 50 MG tablet, Take 1 tablet by mouth 2 (Two) Times a Day., Disp: 180 tablet, Rfl: 3    Ibuprofen-Diphenhydramine Cit (ADVIL PM PO), Take  by mouth., Disp:  , Rfl:     insulin glargine (LANTUS, SEMGLEE) 100 UNIT/ML injection, Inject 6 Units under the skin into the appropriate area as directed Daily., Disp: , Rfl:     levothyroxine (SYNTHROID, LEVOTHROID) 25 MCG tablet, Take 1 tablet by mouth Daily., Disp: , Rfl:     loratadine (CLARITIN) 10 MG tablet, Take 1 tablet by mouth Daily., Disp: , Rfl:     metFORMIN (GLUCOPHAGE) 500 MG tablet, Take 2 tablets by mouth 2 (Two) Times a Day With Meals., Disp: , Rfl:     metoprolol succinate XL (TOPROL-XL) 100 MG 24 hr tablet, Take 1 tablet by mouth Daily., Disp: , Rfl:     olmesartan-hydrochlorothiazide (BENICAR HCT) 40-25 MG per tablet, Take 1 tablet by mouth Daily., Disp: , Rfl:     oxymetazoline (AFRIN) 0.05 % nasal spray, 2 sprays into the nostril(s) as directed by provider 2 (Two) Times a Day., Disp: , Rfl:     pantoprazole (PROTONIX) 40 MG EC tablet, Take 1 tablet by mouth Daily., Disp: , Rfl:     vitamin D (ERGOCALCIFEROL) 1.25 MG (91663 UT) capsule capsule, Take 1 capsule by mouth 1 (One) Time Per Week., Disp: , Rfl:     amLODIPine (NORVASC) 10 MG tablet, Take 1 tablet by mouth Daily., Disp: 90 tablet, Rfl: 3    aspirin 81 MG EC tablet, Take 1 tablet by mouth Daily., Disp: 90 tablet, Rfl: 3              Objective:      Objective      Vitals:     08/08/24 0948   BP: 134/78   Pulse: 76   SpO2: 98%      Vitals and nursing note reviewed.   Constitutional:       Appearance: Normal and healthy appearance. Well-developed and not in distress.   Eyes:      Extraocular Movements: Extraocular movements intact.      Pupils: Pupils are equal, round, and reactive to light.   HENT:      Head: Normocephalic and atraumatic.    Mouth/Throat:      Pharynx: Oropharynx is clear.   Neck:      Vascular: JVD normal.      Trachea: Trachea normal.   Pulmonary:      Effort: Pulmonary effort is normal.      Breath sounds: Normal breath sounds. No wheezing. No rhonchi. No rales.   Cardiovascular:      PMI at left midclavicular line. Normal rate.  Regular rhythm. Normal S1. Normal S2.       Murmurs: There is a grade 2/6 systolic murmur.      No gallop.  No click. No rub.   Pulses:     Dorsalis pedis: 2+ bilaterally.     Posterior tibial: 2+ bilaterally.  Abdominal:      General: Bowel sounds are normal.      Palpations: Abdomen is soft.      Tenderness: There is no abdominal tenderness.   Musculoskeletal: Normal range of motion.      Cervical back: Normal range of motion and neck supple. Skin:     General: Skin is warm and dry.      Capillary Refill: Capillary refill takes less than 2 seconds.   Feet:      Right foot:      Skin integrity: Skin integrity normal.      Left foot:      Skin integrity: Skin integrity normal.   Neurological:      Mental Status: Alert and oriented to person, place and time.      Sensory: Sensation is intact.      Motor: Motor function is intact.      Coordination: Coordination is intact.   Psychiatric:         Speech: Speech normal.         Behavior: Behavior is cooperative.         Physical Exam        Lab Review:            ECG 12 Lead     Date/Time: 8/11/2024 1:25 PM  Performed by: Shailesh Ta DO     Authorized by: Shailesh Ta DO  Comparison: compared with previous ECG   Similar to previous ECG  Rhythm: sinus bradycardia  Rate: bradycardic  Conduction: 1st degree AV block     Clinical impression: abnormal EKG           Results  Laboratory Studies  A1c is 8.7.     Imaging  Ultrasound in March shows the heart is strong, squeezing like it should, relaxing like it should. Mild to moderate thickening of the valve, but no regurgitation.     Testing  Stress test 2 years ago was normal. Holter monitor for 10 days showed a lot of SVT and PACs, but no atrial fibrillation.     Assessment/Plan:      Problem List Items Addressed This Visit         HTN (hypertension), benign     Relevant Medications     amLODIPine (NORVASC) 10 MG tablet     Controlled type 2 diabetes mellitus without complication, without  long-term current use of insulin     TIA (transient ischemic attack)     Atrial fibrillation, new finding     Relevant Medications     amLODIPine (NORVASC) 10 MG tablet     Mixed hyperlipidemia     Chest pain, atypical - Primary     Relevant Orders     Stress Test With Myocardial Perfusion (1 Day)      Assessment & Plan  1. Palpitations.  His stress test from 2 years ago was normal. An ultrasound in 03/2024 showed normal heart function and mild-to-moderate thickening of the valve, but no regurgitation. His EKG is normal today, with no extra beats, atrial fibrillation, or Supraventricular Tachycardia (SVT). He is currently on Eliquis, which is protecting him from atrial fibrillation-induced stroke. His A1c from 03/10/2024 was 8.7, indicating diabetes. His blood glucose levels were normal 9 years ago. A comprehensive blood work will be conducted to assess his diabetes and cholesterol levels. His amlodipine dosage will be increased from 5 mg to 10 mg, and a new prescription will be sent to [facility]. He will continue Eliquis, Lipitor 80 mg, flecainide, insulin, Synthroid, Claritin, metoprolol 100 mg, and Benicar 40/25 mg. A stress test will be ordered. If sleep apnea remains suspected, an in-lab sleep study is recommended.     2. Chest pain.  The description of his chest pain suggests indigestion.     Follow-up  He will follow up in 6 months.        Recommendations/plans:     Transcribed from ambient dictation for Shailesh Ta DO by Shailesh Ta DO.  08/11/24   13:24 CDT     Patient or patient representative verbalized consent for the use of Ambient Listening during the visit with  Shailesh Ta DO for chart documentation. 8/11/2024  13:25 CDT       Contains text generated by [employer]

## 2025-02-18 ENCOUNTER — OFFICE VISIT (OUTPATIENT)
Dept: CARDIOLOGY | Facility: CLINIC | Age: 70
End: 2025-02-18
Payer: MEDICARE

## 2025-02-18 VITALS
SYSTOLIC BLOOD PRESSURE: 130 MMHG | HEART RATE: 70 BPM | WEIGHT: 232 LBS | OXYGEN SATURATION: 98 % | HEIGHT: 67 IN | DIASTOLIC BLOOD PRESSURE: 62 MMHG | BODY MASS INDEX: 36.41 KG/M2

## 2025-02-18 DIAGNOSIS — I10 HTN (HYPERTENSION), BENIGN: ICD-10-CM

## 2025-02-18 DIAGNOSIS — E11.9 CONTROLLED TYPE 2 DIABETES MELLITUS WITHOUT COMPLICATION, WITHOUT LONG-TERM CURRENT USE OF INSULIN: ICD-10-CM

## 2025-02-18 DIAGNOSIS — I48.0 PAROXYSMAL ATRIAL FIBRILLATION: Primary | ICD-10-CM

## 2025-02-18 DIAGNOSIS — E78.2 MIXED HYPERLIPIDEMIA: ICD-10-CM

## 2025-02-18 PROCEDURE — 3075F SYST BP GE 130 - 139MM HG: CPT | Performed by: EMERGENCY MEDICINE

## 2025-02-18 PROCEDURE — 3078F DIAST BP <80 MM HG: CPT | Performed by: EMERGENCY MEDICINE

## 2025-02-18 PROCEDURE — 99214 OFFICE O/P EST MOD 30 MIN: CPT | Performed by: EMERGENCY MEDICINE

## 2025-02-18 NOTE — PROGRESS NOTES
Subjective:     Encounter Date:02/18/2025      Patient ID: Rikki Amaya is a 69 y.o. male.    Chief Complaint:  History of Present Illness  History of Present Illness  The patient presents for evaluation of chest pain, shortness of breath, and palpitations.    He reports intermittent episodes of mild chest discomfort, which are infrequent in nature.    He also experiences dyspnea, particularly during prolonged ambulation, but does not perceive any exacerbation in this symptom.    Additionally, he describes occasional palpitations, characterized as a fluttering sensation, which are not common occurrences.    Supplemental Information  He has not had COVID-19 infection.    MEDICATIONS  Pradaxa, flecainide, amlodipine, baby aspirin, metoprolol, atorvastatin, olmesartan HCTZ combination pill.    The following portions of the patient's history were reviewed and updated as appropriate: allergies, current medications, past family history, past medical history, past social history, past surgical history, and problem list.    Review of Systems   Constitutional: Negative for diaphoresis, fever and malaise/fatigue.   HENT:  Negative for congestion.    Eyes:  Negative for vision loss in left eye and vision loss in right eye.   Cardiovascular:  Positive for chest pain and palpitations. Negative for claudication, dyspnea on exertion, irregular heartbeat, leg swelling, orthopnea and syncope.   Respiratory:  Positive for shortness of breath. Negative for cough and wheezing.    Hematologic/Lymphatic: Negative for adenopathy.   Skin:  Negative for rash.   Musculoskeletal:  Negative for joint pain and joint swelling.   Gastrointestinal:  Negative for abdominal pain, diarrhea, nausea and vomiting.   Neurological:  Negative for excessive daytime sleepiness, dizziness, focal weakness, light-headedness, numbness and weakness.   Psychiatric/Behavioral:  Negative for depression. The patient does not have insomnia.            Current  Outpatient Medications:     amLODIPine (NORVASC) 10 MG tablet, Take 1 tablet by mouth Daily., Disp: 90 tablet, Rfl: 3    aspirin 81 MG EC tablet, Take 1 tablet by mouth Daily., Disp: 90 tablet, Rfl: 3    atorvastatin (LIPITOR) 80 MG tablet, Take 1 tablet by mouth Every Night., Disp: 30 tablet, Rfl: 1    flecainide (TAMBOCOR) 50 MG tablet, Take 1 tablet by mouth 2 (Two) Times a Day., Disp: 180 tablet, Rfl: 3    hydroxychloroquine (PLAQUENIL) 200 MG tablet, Take 1 tablet by mouth 2 (Two) Times a Day., Disp: , Rfl:     Ibuprofen-Diphenhydramine Cit (ADVIL PM PO), Take  by mouth., Disp: , Rfl:     insulin glargine (LANTUS, SEMGLEE) 100 UNIT/ML injection, Inject 6 Units under the skin into the appropriate area as directed Daily., Disp: , Rfl:     levothyroxine (SYNTHROID, LEVOTHROID) 25 MCG tablet, Take 1 tablet by mouth Daily., Disp: , Rfl:     loratadine (CLARITIN) 10 MG tablet, Take 1 tablet by mouth Daily., Disp: , Rfl:     metFORMIN (GLUCOPHAGE) 500 MG tablet, Take 2 tablets by mouth 2 (Two) Times a Day With Meals., Disp: , Rfl:     metoprolol succinate XL (TOPROL-XL) 100 MG 24 hr tablet, Take 1 tablet by mouth Daily., Disp: , Rfl:     olmesartan-hydrochlorothiazide (BENICAR HCT) 40-25 MG per tablet, Take 1 tablet by mouth Daily., Disp: , Rfl:     oxymetazoline (AFRIN) 0.05 % nasal spray, Administer 2 sprays into the nostril(s) as directed by provider 2 (Two) Times a Day., Disp: , Rfl:     pantoprazole (PROTONIX) 40 MG EC tablet, Take 1 tablet by mouth Daily., Disp: , Rfl:     vitamin D (ERGOCALCIFEROL) 1.25 MG (36270 UT) capsule capsule, Take 1 capsule by mouth 1 (One) Time Per Week., Disp: , Rfl:        Objective:      Vitals:    02/18/25 1326   BP: 130/62   Pulse: 70   SpO2: 98%     Vitals and nursing note reviewed.   Constitutional:       Appearance: Normal and healthy appearance. Well-developed and not in distress.   Eyes:      Extraocular Movements: Extraocular movements intact.      Pupils: Pupils are equal,  round, and reactive to light.   HENT:      Head: Normocephalic and atraumatic.    Mouth/Throat:      Pharynx: Oropharynx is clear.   Neck:      Vascular: JVD normal.      Trachea: Trachea normal.   Pulmonary:      Effort: Pulmonary effort is normal.      Breath sounds: Normal breath sounds. No wheezing. No rhonchi. No rales.   Cardiovascular:      PMI at left midclavicular line. Normal rate. Regular rhythm. Normal S1. Normal S2.       Murmurs: There is a grade 2/6 systolic murmur.      No gallop.  No click. No rub.   Pulses:     Dorsalis pedis: 2+ bilaterally.     Posterior tibial: 2+ bilaterally.  Abdominal:      General: Bowel sounds are normal.      Palpations: Abdomen is soft.      Tenderness: There is no abdominal tenderness.   Musculoskeletal: Normal range of motion.      Cervical back: Normal range of motion and neck supple. Skin:     General: Skin is warm and dry.      Capillary Refill: Capillary refill takes less than 2 seconds.   Feet:      Right foot:      Skin integrity: Skin integrity normal.      Left foot:      Skin integrity: Skin integrity normal.   Neurological:      Mental Status: Alert and oriented to person, place and time.      Sensory: Sensation is intact.      Motor: Motor function is intact.      Coordination: Coordination is intact.   Psychiatric:         Speech: Speech normal.         Behavior: Behavior is cooperative.       Physical Exam  Vital Signs  Blood pressure is 130/62. Heart rate is normal.    Lab Review:       Procedures  Results  Imaging  Ultrasound showed normal systolic and diastolic function, valves are okay but calcified.    Testing  Heart cath showed mild disease. Monitor showed PACs.    Assessment/Plan:     Problem List Items Addressed This Visit       HTN (hypertension), benign    Controlled type 2 diabetes mellitus without complication, without long-term current use of insulin    Atrial fibrillation, new finding - Primary    Relevant Orders    Holter Monitor - 72 Hour Up  To 15 Days    Mixed hyperlipidemia     Assessment & Plan  1. Chest pain.  The patient reports experiencing occasional chest pain. A heart catheterization performed in September 2024 revealed only mild disease. An ultrasound showed normal systolic and diastolic function with calcified valves. The patient is currently on amlodipine, metoprolol 100 mg, and olmesartan HCTZ for blood pressure management. Blood pressure is well-controlled at 130/62.    2. Shortness of breath.  The patient experiences shortness of breath primarily when walking long distances, such as to the mailbox and back. This symptom has not worsened. The patient is on atorvastatin for cholesterol management.    3. Palpitations.  The patient reports occasional palpitations and a fluttering sensation. He is currently on flecainide twice a day, which was started in May 2024. The patient is also on Pradaxa for anticoagulation. A 3-day monitor will be placed to ensure the absence of PACs, atrial fibrillation, or SVT, and to confirm that flecainide is effectively controlling these conditions.    Follow-up  The patient will follow up in a few months.      Recommendations/plans:    Transcribed from ambient dictation for Shailesh Ta DO by Sahilesh Ta DO.  02/18/25   15:12 CST    Patient or patient representative verbalized consent for the use of Ambient Listening during the visit with  Shailesh Ta DO for chart documentation. 2/18/2025  15:12 CST

## 2025-05-12 RX ORDER — FLECAINIDE ACETATE 50 MG/1
50 TABLET ORAL 2 TIMES DAILY
Qty: 180 TABLET | Refills: 3 | Status: SHIPPED | OUTPATIENT
Start: 2025-05-12

## 2025-08-07 RX ORDER — AMLODIPINE BESYLATE 10 MG/1
10 TABLET ORAL DAILY
Qty: 90 TABLET | Refills: 3 | Status: SHIPPED | OUTPATIENT
Start: 2025-08-07

## (undated) DEVICE — CUFF,BP,DISP,1 TUBE,ADULT,HP: Brand: MEDLINE

## (undated) DEVICE — DRSNG SURESITE WNDW 4X4.5

## (undated) DEVICE — THE CHANNEL CLEANING BRUSH IS A NYLON FLEXI BRUSH ATTACHED TO A FLEXIBLE PLASTIC SHEATH DESIGNED TO SAFELY REMOVE DEBRIS FROM FLEXIBLE ENDOSCOPES.

## (undated) DEVICE — GLIDESHEATH SLENDER STAINLESS STEEL KIT: Brand: GLIDESHEATH SLENDER

## (undated) DEVICE — TR BAND RADIAL ARTERY COMPRESSION DEVICE: Brand: TR BAND

## (undated) DEVICE — KT NDL GUIDE STRL 18GA

## (undated) DEVICE — PK CATH CARD 30 CA/4

## (undated) DEVICE — CATH F5 INF PIG145 110CM 6SH: Brand: INFINITI

## (undated) DEVICE — SNAR POLYP CAPTIVATOR RND STFF 2.4 240CM 10MM 1P/U

## (undated) DEVICE — SOLIDIFIER LIQ LIQUILOC/PLUS W/TREAT 2000CC

## (undated) DEVICE — THE SINGLE USE ETRAP – POLYP TRAP IS USED FOR SUCTION RETRIEVAL OF ENDOSCOPICALLY REMOVED POLYPS.: Brand: ETRAP

## (undated) DEVICE — RADIFOCUS OPTITORQUE ANGIOGRAPHIC CATHETER: Brand: OPTITORQUE

## (undated) DEVICE — SENSR O2 OXIMAX FNGR A/ 18IN NONSTR

## (undated) DEVICE — CONMED SCOPE SAVER BITE BLOCK, 20X27 MM: Brand: SCOPE SAVER

## (undated) DEVICE — MASK,OXYGEN,MED CONC,ADLT,7' TUB, UC: Brand: PENDING

## (undated) DEVICE — TBG SMPL FLTR LINE NASL 02/C02 A/ BX/100

## (undated) DEVICE — DRAPE,ANGIO,BRACH,STERILE,38X44: Brand: MEDLINE

## (undated) DEVICE — PAD, DEFIB, ADULT, RADIOTRANS, PHYSIO: Brand: MEDLINE

## (undated) DEVICE — YANKAUER,BULB TIP WITH VENT: Brand: ARGYLE

## (undated) DEVICE — MODEL AT P65, P/N 701554-001KIT CONTENTS: HAND CONTROLLER, 3-WAY HIGH-PRESSURE STOPCOCK WITH ROTATING END AND PREMIUM HIGH-PRESSURE TUBING: Brand: ANGIOTOUCH® KIT

## (undated) DEVICE — GW STARTER FXD CORE J .035 3X260CM 3MM

## (undated) DEVICE — CANN NASL ETCO2 LO/FLO A/

## (undated) DEVICE — MODEL BT2000 P/N 700287-012KIT CONTENTS: MANIFOLD WITH SALINE AND CONTRAST PORTS, SALINE TUBING WITH SPIKE AND HAND SYRINGE, TRANSDUCER: Brand: BT2000 AUTOMATED MANIFOLD KIT

## (undated) DEVICE — Device: Brand: DEFENDO AIR/WATER/SUCTION AND BIOPSY VALVE

## (undated) DEVICE — SOL IRR NACL 0.9PCT BT 1000ML

## (undated) DEVICE — SUP ARMBRD ART/LINE BLU